# Patient Record
Sex: FEMALE | Race: WHITE | Employment: OTHER | URBAN - METROPOLITAN AREA
[De-identification: names, ages, dates, MRNs, and addresses within clinical notes are randomized per-mention and may not be internally consistent; named-entity substitution may affect disease eponyms.]

---

## 2017-02-23 ENCOUNTER — APPOINTMENT (OUTPATIENT)
Dept: GENERAL RADIOLOGY | Age: 72
End: 2017-02-23
Attending: EMERGENCY MEDICINE
Payer: MEDICARE

## 2017-02-23 ENCOUNTER — HOSPITAL ENCOUNTER (EMERGENCY)
Age: 72
Discharge: HOME | End: 2017-02-23
Attending: EMERGENCY MEDICINE
Payer: MEDICARE

## 2017-02-23 VITALS
RESPIRATION RATE: 18 BRPM | OXYGEN SATURATION: 99 % | HEART RATE: 80 BPM | TEMPERATURE: 97.7 F | HEIGHT: 62 IN | SYSTOLIC BLOOD PRESSURE: 125 MMHG | BODY MASS INDEX: 29.26 KG/M2 | WEIGHT: 159 LBS | DIASTOLIC BLOOD PRESSURE: 54 MMHG

## 2017-02-23 DIAGNOSIS — S80.02XA CONTUSION OF LEFT KNEE, INITIAL ENCOUNTER: Primary | ICD-10-CM

## 2017-02-23 DIAGNOSIS — S86.912A STRAIN OF LEFT KNEE AND LEG, INITIAL ENCOUNTER: ICD-10-CM

## 2017-02-23 PROCEDURE — 99282 EMERGENCY DEPT VISIT SF MDM: CPT

## 2017-02-23 PROCEDURE — 73564 X-RAY EXAM KNEE 4 OR MORE: CPT

## 2017-02-23 NOTE — ED TRIAGE NOTES
Triage: pt states that she caught her foot underneath her and fell to the hard surface floor. Pt complains of pain to left knee. Before fall, pt had appt with ortho this morning and had a routine injection of steroids in left knee.

## 2017-02-23 NOTE — DISCHARGE INSTRUCTIONS

## 2017-02-23 NOTE — ED NOTES
Steffen Louise is a 66 y.o. female that was discharged in good condition. The patients diagnosis, condition and treatment were explained to  patient and aftercare instructions were given. The patient verbalized understanding. Patient armband removed and shreddedPt declined discharge vital signs. Landy August

## 2017-02-23 NOTE — ED PROVIDER NOTES
HPI Comments: 11:12 AM Mona Valencia is a 66 y.o. female with a history of HTN, DM, Hyperlipidemia and Osteopenia who presents to the emergency department for evaluation after she fell today at the grocery store. Pt confirms that she tripped to initiate the fall, she denies any lightheadedness, dizziness, chest pain, blurred vision, chest tightness or SOB prior to or following the fall. She denies hitting her head with the fall. Pt states that when she fell she landed onto her L knee. She notes that she is getting injections into her L knee to promote increased cartilage in her knee, her second injection was this morning at 0830 AM. Pt denies any other pain secondary to the fall. Pt admits to drinking she denies smoking cigarettes. PCP: Chelsea Lynn MD          The history is provided by the patient.         Past Medical History:   Diagnosis Date    Diabetes mellitus ([facility] [address]     [facility]; on insulin pump    FHx: breast cancer     Hemorrhoids     Hyperlipidemia     Hypertension     Macular degeneration     and retinopathy Dr Marlene Wade Osteopenia        Past Surgical History:   Procedure Laterality Date    DEXA BONE DENSITY STUDY AXIAL      [facility] D[facility] t score -1.2 spine, -2.1 hip (11/10) improved from 2007/2003;  apparently no change from prior (1/13)   JOSE R Higginbotham, DIAGNOSTIC  2005    Dr. Paddy Mccormick negative   Dimitrios French 2015    US ABD COMP  8/02    negative         Family History:   Problem Relation Age of Onset    Cancer Mother      breast    Breast Cancer Mother        Social History     Social History    Marital status:      Spouse name: N/A    Number of children: 2    Years of education: N/A     Occupational History    ret  now part time [employer]      Social History Main Topics    Smoking status: Former Smoker     Quit date: 8/19/1995    Smokeless tobacco: Not on file    Alcohol use 0.5 oz/week     1 Glasses of wine per week      Comment: nightly    Drug use: No    Sexual activity: Not on file     Other Topics Concern    Not on file     Social History Narrative         ALLERGIES: Fosamax [alendronate]    Review of Systems   Constitutional: Negative for chills and fever. HENT: Negative for congestion and rhinorrhea. Eyes: Negative. Respiratory: Negative for cough and shortness of breath. Cardiovascular: Negative. Negative for chest pain and leg swelling. Gastrointestinal: Negative. Negative for abdominal pain and nausea. Genitourinary: Negative for dysuria and hematuria. Musculoskeletal: Positive for arthralgias (L knee pain). Negative for myalgias. Skin: Negative for rash and wound. Neurological: Negative for light-headedness and headaches. Psychiatric/Behavioral: Negative for confusion and hallucinations. All other systems reviewed and are negative. Vitals:    02/23/17 1020   BP: 125/54   Pulse: 80   Resp: 18   Temp: 97.7 °F (36.5 °C)   SpO2: 99%   Weight: 72.1 kg (159 lb)   Height: 5' 2\" (1.575 m)            Physical Exam   Constitutional: She is oriented to person, place, and time. She appears well-developed. HENT:   Head: Normocephalic. Mouth/Throat: Oropharynx is clear and moist.   Eyes: Pupils are equal, round, and reactive to light. Neck: Normal range of motion. Cardiovascular: Normal rate and normal heart sounds. No murmur heard. Pulmonary/Chest: Effort normal. She has no wheezes. She has no rales. Abdominal: Soft. There is no tenderness. Musculoskeletal: She exhibits no edema. Left knee: She exhibits decreased range of motion (secondary to pain). No cervical spine tenderness. Patella non tender, distal femur and proximal tibia non tender. Neurological: She is alert and oriented to person, place, and time. She has normal strength. No cranial nerve deficit or sensory deficit. No focal neurological deficits. Skin: Skin is warm and dry. Abrasion noted. 1 cm abrasion on skin of L patella. Nursing note and vitals reviewed. MDM  Number of Diagnoses or Management Options  Contusion of left knee, initial encounter:   Strain of left knee and leg, initial encounter:   Diagnosis management comments: mechanical fall  Left knee abrasion, ecchymosis  No patella tenderness. No Knee tenderness  No ant/post drawer sign  No bony tenderness  No wanting crutches and would rather bear weight. ED Course       Procedures    Vitals:  No data found. 99 %. Percentage is within normal limits. Medications ordered:   Medications - No data to display       X-Ray, CT or other radiology findings:  XR KNEE LT MIN 4 V   Final Result:  IMPRESSION:     1. No acute fracture or subluxation. 2. Mild joint effusion. 3. Tricompartmental osteoarthrosis, most pronounced in the medial compartment. Per radiology. Progress notes, Consult notes or Re-evaluation:   Discussed all results with pt and pt agrees with plan for discharge. All questions answered at this time. ED warnings given for any new or worsening symptoms. Pt voices understanding. Pt discharged in stable condition. Disposition:  Diagnosis:   1. Contusion of left knee, initial encounter    2. Strain of left knee and leg, initial encounter        Disposition: DISCHARGED    Follow-up Information     Follow up With Details Comments [address] [place] [address], [address] [place] [address] IVON/Giovanna Calderon 1106 [phone]      your bone doctor In 1 day            Scribe Attestation:     I, Elvira Carrel, scribing for and in the presence of Renate Garcia MD February 24, 2017 at 12:46 PM       Physician Attestation:   I personally performed the services described in this documentation, reviewed and edited the documentation which was dictated to the scribe in my presence, and it accurately records my words and actions.  Renate Garcia MD  February 24, 2017 at 12:46 PM    Signed by: Kristen Mcnally February 24, 2017, 12:46 PM

## 2017-02-24 ENCOUNTER — PATIENT OUTREACH (OUTPATIENT)
Dept: INTERNAL MEDICINE CLINIC | Age: 72
End: 2017-02-24

## 2017-02-24 ENCOUNTER — HOSPITAL ENCOUNTER (OUTPATIENT)
Dept: LAB | Age: 72
Discharge: HOME | End: 2017-02-24
Payer: MEDICARE

## 2017-02-24 ENCOUNTER — LAB ONLY (OUTPATIENT)
Dept: INTERNAL MEDICINE CLINIC | Age: 72
End: 2017-02-24

## 2017-02-24 DIAGNOSIS — E11.9 TYPE 2 DIABETES MELLITUS WITHOUT COMPLICATION, UNSPECIFIED LONG TERM INSULIN USE STATUS: Primary | ICD-10-CM

## 2017-02-24 DIAGNOSIS — E11.9 TYPE 2 DIABETES MELLITUS WITHOUT COMPLICATION, UNSPECIFIED LONG TERM INSULIN USE STATUS: ICD-10-CM

## 2017-02-24 LAB
ALBUMIN SERPL BCP-MCNC: 3.8 G/DL (ref 3.4–5)
ALBUMIN/GLOB SERPL: 1.3 {RATIO} (ref 0.8–1.7)
ALP SERPL-CCNC: 90 U/L (ref 45–117)
ALT SERPL-CCNC: 20 U/L (ref 13–56)
ANION GAP BLD CALC-SCNC: 9 MMOL/L (ref 3–18)
AST SERPL W P-5'-P-CCNC: 18 U/L (ref 15–37)
BASOPHILS # BLD AUTO: 0 K/UL (ref 0–0.06)
BASOPHILS # BLD: 1 % (ref 0–2)
BILIRUB SERPL-MCNC: 0.6 MG/DL (ref 0.2–1)
BUN SERPL-MCNC: 17 MG/DL (ref 7–18)
BUN/CREAT SERPL: 21 (ref 12–20)
CALCIUM SERPL-MCNC: 8.6 MG/DL (ref 8.5–10.1)
CHLORIDE SERPL-SCNC: 106 MMOL/L (ref 100–108)
CHOLEST SERPL-MCNC: 156 MG/DL
CO2 SERPL-SCNC: 25 MMOL/L (ref 21–32)
CREAT SERPL-MCNC: 0.8 MG/DL (ref 0.6–1.3)
DIFFERENTIAL METHOD BLD: NORMAL
EOSINOPHIL # BLD: 0.1 K/UL (ref 0–0.4)
EOSINOPHIL NFR BLD: 1 % (ref 0–5)
ERYTHROCYTE [DISTWIDTH] IN BLOOD BY AUTOMATED COUNT: 13.8 % (ref 11.6–14.5)
EST. AVERAGE GLUCOSE BLD GHB EST-MCNC: 189 MG/DL
GLOBULIN SER CALC-MCNC: 2.9 G/DL (ref 2–4)
GLUCOSE SERPL-MCNC: 90 MG/DL (ref 74–99)
HBA1C MFR BLD: 8.2 % (ref 4.2–5.6)
HCT VFR BLD AUTO: 41.4 % (ref 35–45)
HDLC SERPL-MCNC: 97 MG/DL (ref 40–60)
HDLC SERPL: 1.6 {RATIO} (ref 0–5)
HGB BLD-MCNC: 13.5 G/DL (ref 12–16)
LDLC SERPL CALC-MCNC: 47.4 MG/DL (ref 0–100)
LIPID PROFILE,FLP: ABNORMAL
LYMPHOCYTES # BLD AUTO: 25 % (ref 21–52)
LYMPHOCYTES # BLD: 1.5 K/UL (ref 0.9–3.6)
MCH RBC QN AUTO: 31.6 PG (ref 24–34)
MCHC RBC AUTO-ENTMCNC: 32.6 G/DL (ref 31–37)
MCV RBC AUTO: 97 FL (ref 74–97)
MONOCYTES # BLD: 0.4 K/UL (ref 0.05–1.2)
MONOCYTES NFR BLD AUTO: 7 % (ref 3–10)
NEUTS SEG # BLD: 4 K/UL (ref 1.8–8)
NEUTS SEG NFR BLD AUTO: 66 % (ref 40–73)
PLATELET # BLD AUTO: 279 K/UL (ref 135–420)
PMV BLD AUTO: 10 FL (ref 9.2–11.8)
POTASSIUM SERPL-SCNC: 3.9 MMOL/L (ref 3.5–5.5)
PROT SERPL-MCNC: 6.7 G/DL (ref 6.4–8.2)
RBC # BLD AUTO: 4.27 M/UL (ref 4.2–5.3)
SODIUM SERPL-SCNC: 140 MMOL/L (ref 136–145)
TRIGL SERPL-MCNC: 58 MG/DL (ref ?–150)
VLDLC SERPL CALC-MCNC: 11.6 MG/DL
WBC # BLD AUTO: 6.1 K/UL (ref 4.6–13.2)

## 2017-02-24 PROCEDURE — 80061 LIPID PANEL: CPT | Performed by: INTERNAL MEDICINE

## 2017-02-24 PROCEDURE — 36415 COLL VENOUS BLD VENIPUNCTURE: CPT | Performed by: INTERNAL MEDICINE

## 2017-02-24 PROCEDURE — 85025 COMPLETE CBC W/AUTO DIFF WBC: CPT | Performed by: INTERNAL MEDICINE

## 2017-02-24 PROCEDURE — 83036 HEMOGLOBIN GLYCOSYLATED A1C: CPT | Performed by: INTERNAL MEDICINE

## 2017-02-24 PROCEDURE — 80053 COMPREHEN METABOLIC PANEL: CPT | Performed by: INTERNAL MEDICINE

## 2017-02-24 PROCEDURE — 82043 UR ALBUMIN QUANTITATIVE: CPT | Performed by: INTERNAL MEDICINE

## 2017-02-24 NOTE — PROGRESS NOTES
NNTOCED  Patient admitted to [address] [place] [address], 2/23/17 to 2/23/17 for left knee contusion s/p fall. Presenting symptoms:   Left knee pain after fall    New medications/changes to current medications:  None     ED admissions in the past 6 months: 1    Contacted patient for ED follow up. Verified 2 patient identifiers. Introduced self, role and reason for call. Patient reports:  Hurts behind knee  Taking Aleve  Applying ice   Has full ROM  Tiny bruise at the top of knee    Patient denies:  Redness  Warm to touch  Tender to touch  Swelling    ADL's:  Performs all ADL's independently    DME:   None     Support:        Patient reported she is having pain behind left knee. Received a gel injection to left knee at Dr. Lorraine Marin office  the day of fall. She then went to [facility] to  some juice where she fell on floor hitting her left knee. Advised patient to contact the ortho office to notify them of fall and pain behind left knee. Patient communicated she would do so. Patient aware of appointment  Dr. Imani Beltran on 3/16/17. Advised patient if she needs to be seen sooner to contact office.

## 2017-02-25 LAB
CREAT UR-MCNC: 174.54 MG/DL (ref 30–125)
MICROALBUMIN UR-MCNC: 1.2 MG/DL (ref 0–3)
MICROALBUMIN/CREAT UR-RTO: 7 MG/G (ref 0–30)

## 2017-03-13 NOTE — PROGRESS NOTES
This is a Subsequent Medicare Annual Wellness Visit providing Personalized Prevention Plan Services (PPPS)     I have reviewed the patient's medical history in detail and updated the computerized patient record. History     Past Medical History:   Diagnosis Date    Arthritis     Dr Surekha Wallis; knees    Diabetes mellitus ([facility] [address]     [facility]; on insulin pump    FHx: breast cancer     Hemorrhoids     Hyperlipidemia     Hypertension     Macular degeneration     and retinopathy Dr Cassandra Sheridan t score -1.2 spine, -2.1 hip (11/10) improved from 2007/2003;  no change (1/13)      Past Surgical History:   Procedure Laterality Date    Satnam Shirley 2015   Donata Goods      Dr. Yomaira Montgomery 2005 negative; Dr Corky Brown 9/15 negative    US ABD COMP  8/02    negative     Current Outpatient Prescriptions   Medication Sig Dispense Refill    insulin lispro (HUMALOG) 100 unit/mL injection by Continuous Infusion route. On insulin pump managed by DI      evening primrose oil 500 mg cap Take 500 mg by mouth nightly.  raloxifene (EVISTA) 60 mg tablet Take  by mouth daily.  simvastatin (ZOCOR) 20 mg tablet Take  by mouth nightly.  lisinopril (PRINIVIL, ZESTRIL) 10 mg tablet Take  by mouth daily.          Allergies   Allergen Reactions    Fosamax [Alendronate] Other (comments)     GI upset     Family History   Problem Relation Age of Onset    Cancer Mother      breast    Breast Cancer Mother      Social History   Substance Use Topics    Smoking status: Former Smoker     Quit date: 8/19/1995    Smokeless tobacco: Not on file    Alcohol use 0.5 oz/week     1 Glasses of wine per week      Comment: nightly     Depression Risk Factor Screening:     PHQ 2 / 9, over the last two weeks 3/16/2017   Little interest or pleasure in doing things Not at all   Feeling down, depressed or hopeless Not at all   Total Score PHQ 2 0     SCREENINGS  Colonoscopy last done 9/15 Dr Lex Babcock last done 9/14  DEXA last done 1/13  Gyn last done Dr Yury Jarvis 5/12    Immunization History   Administered Date(s) Administered    Influenza High Dose Vaccine PF 10/09/2015    Influenza Vaccine 09/11/2013    Influenza Vaccine Whole 09/20/2011, 10/03/2012    Pneumococcal Conjugate (PCV-13) 07/10/2015    Pneumococcal Vaccine (Unspecified Type) 01/01/1999, 01/18/2008    TDAP Vaccine 05/24/2012    Zoster 02/26/2008     Alcohol Risk Factor Screening: On any occasion during the past 3 months, have you had more than 3 drinks containing alcohol? No    Do you average more than 7 drinks per week? No      Functional Ability and Level of Safety:     Hearing Loss   normal-to-mild    Vision - no acute complaints and is followed by Dr Obinna Lucero and Dr Catina Colby of [facility]   Self-care. Requires assistance with: no ADLs    Fall Risk     Fall Risk Assessment, last 12 mths 3/16/2017   Able to walk? Yes   Fall in past 12 months? No     Abuse Screen   Patient is not abused    Review of Systems   A comprehensive review of systems was negative except for that written in the HPI.     Physical Examination     Evaluation of Cognitive Function:  Mood/affect:  neutral  Appearance: age appropriate, well dressed and within normal Limits  Family member/caregiver input: na    Patient Care Team:  Elyse Johns MD as PCP - General (Internal Medicine)  Nadine Gonzalez, RN as Ambulatory Care Navigator (Internal Medicine)  Diana Willson, YOUSIF as Ambulatory Care Navigator (Internal Medicine)    Advice/Referrals/Counseling   Education and counseling provided:  Are appropriate based on today's review and evaluation  End-of-Life planning (with patient's consent)  Pneumococcal Vaccine  Influenza Vaccine  Screening Mammography  Colorectal cancer screening tests  Cardiovascular screening blood test  Bone mass measurement (DEXA)  Diabetes screening test    Assessment/Plan ICD-10-CM ICD-9-CM    1. Routine general medical examination at a health care facility Z00.00 V70.0    2. Screening for alcoholism Z13.89 V79.1    3. Advanced directives, counseling/discussion Z71.89 V65.49 ADVANCE CARE PLANNING FIRST 30 MINS   4. Screening for depression Z13.89 V79.0 DEPRESSION SCREEN ANNUAL   5. Screen for colon cancer Z12.11 V76.51    6. Screening for ischemic heart disease Z13.6 V81.0    7. Encounter for screening mammogram for malignant neoplasm of breast Z12.31 V76.12    8. Postmenopausal Z78.0 V49.81    9. Diabetes mellitus due to underlying condition with diabetic nephropathy, with long-term current use of insulin ([facility]) E08.21 249.40 CBC W/O DIFF    Z79.4 583.81 LIPID PANEL     R83.35 METABOLIC PANEL, COMPREHENSIVE      HEMOGLOBIN A1C W/O EAG      MICROALBUMIN, UR, RAND W/ MICROALBUMIN/CREA RATIO   10. Osteopenia M85.80 733.90    11. Hyperlipidemia, unspecified hyperlipidemia type E78.5 272.4    12. Primary hypertension I10 401.9    13. Uncontrolled type 2 diabetes mellitus without complication, with long-term current use of insulin ([facility]) E11.65 250.02     Z79.4 V58.67      current treatment plan is effective, no change in therapy  lab results and schedule of future lab studies reviewed with patient. End of life discussion undertaken.   Will get copy of AMD  Flu high dose when in season  Colonoscopy done  Mammo to be scheduled 2017  DEXA results to be obtained

## 2017-03-13 NOTE — ACP (ADVANCE CARE PLANNING)
Advance Care Planning    Advance Care Planning (ACP) Provider Note - Comprehensive     Date of ACP Conversation: 03/16/17  Persons included in Conversation:  patient  Length of ACP Conversation in minutes:  16 minutes    Authorized Decision Maker (if patient is incapable of making informed decisions): This person is: daughter  Healthcare Agent/Medical Power of  under Advance Directive          General ACP for ALL Patients with Decision Making Capacity:   Importance of advance care planning, including choosing a healthcare agent to communicate patient's healthcare decisions if patient lost the ability to make decisions, such as after a sudden illness or accident  Understanding of the healthcare agent role was assessed and information provided  Exploration of values, goals, and preferences if recovery is not expected, even with continued medical treatment in the event of: Imminent death  Severe, permanent brain injury    Review of Existing Advance Directive:  Does this advance directive still reflect your preferences? Yes (Provide new form/Refer for assistance in updating)    For Serious or Chronic Illness:  Understanding of medical condition    Understanding of CPR, goals and expected outcomes, benefits and burdens discussed.     Interventions Provided:  Recommended communicating the plan and making copies for the healthcare agent, personal physician, and others as appropriate (e.g., health system)  Recommended review of completed ACP document annually or upon change in health status

## 2017-03-13 NOTE — PATIENT INSTRUCTIONS
Medicare Part B Preventive Services Limitations Recommendation Scheduled   Bone Mass Measurement  (age 72 & older, biennial) Requires diagnosis related to osteoporosis or estrogen deficiency. Biennial benefit unless patient has history of long-term glucocorticoid tx or baseline is needed because initial test was by other method     Cardiovascular Screening Blood Tests (every 5 years)  Total cholesterol, HDL, Triglycerides Order as a panel if possible     Colorectal Cancer Screening  -Fecal occult blood test (annual)  -Flexible sigmoidoscopy (5y)  -Screening colonoscopy (10y)  -Barium Enema      Counseling to Prevent Tobacco Use (up to 8 sessions per year)  - Counseling greater than 3 and up to 10 minutes  - Counseling greater than 10 minutes Patients must be asymptomatic of tobacco-related conditions to receive as preventive service     Diabetes Screening Tests (at least every 3 years, Medicare covers annually or at 6-month intervals for prediabetic patients)    Fasting blood sugar (FBS) or glucose tolerance test (GTT) Patient must be diagnosed with one of the following:  -Hypertension, Dyslipidemia, obesity, previous impaired FBS or GTT  Or any two of the following: overweight, FH of diabetes, age ? 72, history of gestational diabetes, birth of baby weighing more than 9 pounds     Diabetes Self-Management Training (DSMT) (no USPSTF recommendation) Requires referral by treating physician for patient with diabetes or renal disease. 10 hours of initial DSMT session of no less than 30 minutes each in a continuous 12-month period. 2 hours of follow-up DSMT in subsequent years.      Glaucoma Screening (no USPSTF recommendation) Diabetes mellitus, family history, , age 48 or over,  American, age 72 or over     Human Immunodeficiency Virus (HIV) Screening (annually for increased risk patients)  HIV-1 and HIV-2 by EIA, ROC, rapid antibody test, or oral mucosa transudate Patient must be at increased risk for HIV infection per USPSTF guidelines or pregnant. Tests covered annually for patients at increased risk. Pregnant patients may receive up to 3 test during pregnancy. Medical Nutrition Therapy (MNT) (for diabetes or renal disease not recommended schedule) Requires referral by treating physician for patient with diabetes or renal disease. Can be provided in same year as diabetes self-management training (DSMT), and CMS recommends medical nutrition therapy take place after DSMT. Up to 3 hours for initial year and 2 hours in subsequent years. Prostate Cancer Screening (annually up to age 76)  - Digital rectal exam (ENOCH)  - Prostate specific antigen (PSA) Annually (age 48 or over), ENOCH not paid separately when covered E/M service is provided on same date     Seasonal Influenza Vaccination (annually)      Pneumococcal Vaccination (once after 72)      Hepatitis B Vaccinations (if medium/high risk) Medium/high risk factors:  End-stage renal disease,  Hemophiliacs who received Factor VIII or IX concentrates, Clients of institutions for the mentally retarded, Persons who live in the same house as a HepB virus carrier, Homosexual men, Illicit injectable drug abusers. Screening Mammography (biennial age 54-69)? Annually (age 36 or over)     Screening Pap Tests and Pelvic Examination (up to age 79 and after 79 if unknown history or abnormal study last 10 years) Every 25 months except high risk     Ultrasound Screening for Abdominal Aortic Aneurysm (AAA) (once) Patient must be referred through IPPE and not have had a screening for abdominal aortic aneurysm before under Medicare.   Limited to patients who meet one of the following criteria:  - Men who are 73-68 years old and have smoked more than 100 cigarettes in their lifetime.  -Anyone with a FH of AAA  -Anyone recommended for screening by USPSTF

## 2017-03-13 NOTE — PROGRESS NOTES
66 y.o. white female who presents for evaluation. She seems to be doing very well. She continues to see URI Galeas at the [facility]. No polyuria, polydipsia, nocturia. No neuropathy symptoms, she continues to see Dr. Dileep West but not podiatry. Denies any cardiovascular complaints. Her knees have been an issue lately but she tries to walk her dog a few blocks around the [facility] daily    She is seeing Dr Kike jimenez for the arthritis    Denies GI or  complaints. Past Medical History:   Diagnosis Date    Arthritis     Dr Shannon Wallis; knees    Diabetes mellitus ([facility] [address]     [facility]; on insulin pump    FHx: breast cancer     Hemorrhoids     Hyperlipidemia     Hypertension     Macular degeneration     and retinopathy Dr Butler Medal t score -1.2 spine, -2.1 hip (11/10) improved from 2007/2003;  no change (1/13)     Past Surgical History:   Procedure Laterality Date    Alvin West 2015   Nicole Carpenter 2005 negative; Dr Paloma Driscoll 9/15 negative    US ABD COMP  8/02    negative     Social History     Social History    Marital status:      Spouse name: N/A    Number of children: 2    Years of education: N/A     Occupational History    ret  now part time [employer]      Social History Main Topics    Smoking status: Former Smoker     Quit date: 8/19/1995    Smokeless tobacco: Not on file    Alcohol use 0.5 oz/week     1 Glasses of wine per week      Comment: nightly    Drug use: No    Sexual activity: Not on file     Other Topics Concern    Not on file     Social History Narrative     Current Outpatient Prescriptions   Medication Sig    insulin lispro (HUMALOG) 100 unit/mL injection by Continuous Infusion route. On insulin pump managed by DI    evening primrose oil 500 mg cap Take 500 mg by mouth nightly.  raloxifene (EVISTA) 60 mg tablet Take  by mouth daily.       simvastatin (ZOCOR) 20 mg tablet Take  by mouth nightly.  lisinopril (PRINIVIL, ZESTRIL) 10 mg tablet Take  by mouth daily. No current facility-administered medications for this visit. Allergies   Allergen Reactions    Fosamax [Alendronate] Other (comments)     GI upset     LAST MEDICARE WELLNESS EXAM: 7/10/14, 7/11/15, 3/16/17  ACP 3/16/17    REVIEW OF SYSTEMS:  gyn Dr Tae Raphael 2012, mammo 2012, sees Dr Yonathan Nolasco, last DEXA 1/13, colo 9/15 Dr Jodie Robertson  Ophtho - no vision change or eye pain  Oral - no mouth pain, tongue or tooth problems  Ears - no hearing loss, ear pain, fullness, no swallowing problems  Cardiac - no CP, PND, orthopnea, edema, palpitations or syncope  Chest - no breast masses  Resp - no wheezing, chronic coughing, dyspnea  GI - no heartburn, nausea, vomiting, change in bowel habits, bleeding, hemorrhoids  Urinary - no dysuria, hematuria, flank pain, urgency, frequency  Ortho - no swelling, dec ROM, myalgias  Derm - no nail abnormalities, rashes, lesions of note, hair loss  Psych - denies any anxiety or depression symptoms, no hallucinations or violent ideation  Constitutional - no wt loss, night sweats, unexplained fevers  Neuro - no focal weakness, numbness, paresthesias, incoordination, ataxia, involuntary movements  Endo - no polyuria, polydipsia, nocturia, hot flashes    Visit Vitals    /72    Pulse 96    Temp 97.9 °F (36.6 °C) (Oral)    Ht 5' 2\" (1.575 m)    Wt 162 lb (73.5 kg)    SpO2 98%    BMI 29.63 kg/m2     Affect is appropriate. Mood stable  No apparent distress  HEENT --Anicteric sclerae, tympanic membranes normal,  ear canals normal.  No thyromegaly, JVD, or bruits. PERRL, EOMI, conjunctiva and lids normal.    Lungs --Clear to auscultation and percussion, normal percussion. Heart --Regular rate and rhythm, no murmurs, rubs, gallops, or clicks. Chest wall --Nontender to palpation. PMI normal.  Abdomen -- Soft and nontender, no hepatosplenomegaly or masses.   Extremities -- Without cyanosis, clubbing, edema. 2+ pulses equally and bilaterally. Foot exam bilaterally showed  Reflexes 2+   Vibration, proprioception, filament test intact  Pulses 1-2+ DP and PT    LABS   From 5/12 showed   gluc 155, cr 0.69, gfr 90,  alt 12,          chol 149, tg 75, hdl 76, ldl-c 58,      hb 13.2, plt 290, sadia neg  From 11/13 showed gluc 134, cr 0.69, gfr>60,    hba1c 7.3,      wbc 7.9, hb 11.1, plt 235, ck/trop neg  From 1/14 showed                     umar 9.0  From 4/14 showed        hba1c 7.0, chol 162, tg 73, hdl 98, ldl-c 50  From 7/15 showed   gluc 101, cr 0.76, gfr>60, alt<5,  hba1c 8.0, chol 152, tg 49, hdl 81, ldl-c 61, wbc 5.3, hb 13.6, plt 245, umar 4.2    Results for orders placed or performed during the hospital encounter of 02/24/17   MICROALBUMIN, UR, RAND W/ MICROALBUMIN/CREA RATIO   Result Value Ref Range    Microalbumin,urine random 1.20 0 - 3.0 MG/DL    Creatinine, urine 174.54 (H) 30 - 125 mg/dL    Microalbumin/Creat ratio (mg/g creat) 7 0 - 30 mg/g   METABOLIC PANEL, COMPREHENSIVE   Result Value Ref Range    Sodium 140 136 - 145 mmol/L    Potassium 3.9 3.5 - 5.5 mmol/L    Chloride 106 100 - 108 mmol/L    CO2 25 21 - 32 mmol/L    Anion gap 9 3.0 - 18 mmol/L    Glucose 90 74 - 99 mg/dL    BUN 17 7.0 - 18 MG/DL    Creatinine 0.80 0.6 - 1.3 MG/DL    BUN/Creatinine ratio 21 (H) 12 - 20      GFR est AA >60 >60 ml/min/1.73m2    GFR est non-AA >60 >60 ml/min/1.73m2    Calcium 8.6 8.5 - 10.1 MG/DL    Bilirubin, total 0.6 0.2 - 1.0 MG/DL    ALT (SGPT) 20 13 - 56 U/L    AST (SGOT) 18 15 - 37 U/L    Alk.  phosphatase 90 45 - 117 U/L    Protein, total 6.7 6.4 - 8.2 g/dL    Albumin 3.8 3.4 - 5.0 g/dL    Globulin 2.9 2.0 - 4.0 g/dL    A-G Ratio 1.3 0.8 - 1.7     LIPID PANEL   Result Value Ref Range    LIPID PROFILE          Cholesterol, total 156 <200 MG/DL    Triglyceride 58 <150 MG/DL    HDL Cholesterol 97 (H) 40 - 60 MG/DL    LDL, calculated 47.4 0 - 100 MG/DL    VLDL, calculated 11.6 MG/DL    CHOL/HDL Ratio 1.6 0 - 5.0     HEMOGLOBIN A1C WITH EAG   Result Value Ref Range    Hemoglobin A1c 8.2 (H) 4.2 - 5.6 %    Est. average glucose 189 mg/dL   CBC WITH AUTOMATED DIFF   Result Value Ref Range    WBC 6.1 4.6 - 13.2 K/uL    RBC 4.27 4.20 - 5.30 M/uL    HGB 13.5 12.0 - 16.0 g/dL    HCT 41.4 35.0 - 45.0 %    MCV 97.0 74.0 - 97.0 FL    MCH 31.6 24.0 - 34.0 PG    MCHC 32.6 31.0 - 37.0 g/dL    RDW 13.8 11.6 - 14.5 %    PLATELET 338 222 - 509 K/uL    MPV 10.0 9.2 - 11.8 FL    NEUTROPHILS 66 40 - 73 %    LYMPHOCYTES 25 21 - 52 %    MONOCYTES 7 3 - 10 %    EOSINOPHILS 1 0 - 5 %    BASOPHILS 1 0 - 2 %    ABS. NEUTROPHILS 4.0 1.8 - 8.0 K/UL    ABS. LYMPHOCYTES 1.5 0.9 - 3.6 K/UL    ABS. MONOCYTES 0.4 0.05 - 1.2 K/UL    ABS. EOSINOPHILS 0.1 0.0 - 0.4 K/UL    ABS. BASOPHILS 0.0 0.0 - 0.06 K/UL    DF AUTOMATED       Patient Active Problem List   Diagnosis Code    Diabetes mellitus on insulin pump Dr. Erik Elizondo E11.9    Osteopenia M85.80    Hyperlipidemia E78.5    Primary hypertension I10    Uncontrolled type 2 diabetes mellitus without complication, with long-term current use of insulin ([facility]) E11.65, Z79.4     Assessment and plan:  1. Diabetes. Doing relatively well, f/u NP at [facility], f/u Dr. Yolanda Bourgeois  2. Hypertension. Continue current  3. Hyperlipidemia. Continue Zocor   4. Osteopenia. Continue Evista, calcium plus D., weight bearing exercise. DEXA to be done        RTC 3/18    Above conditions discussed at length and patient vocalized understanding.   All questions answered to patient satifaction

## 2017-03-16 ENCOUNTER — OFFICE VISIT (OUTPATIENT)
Dept: INTERNAL MEDICINE CLINIC | Age: 72
End: 2017-03-16

## 2017-03-16 VITALS
BODY MASS INDEX: 29.81 KG/M2 | SYSTOLIC BLOOD PRESSURE: 130 MMHG | DIASTOLIC BLOOD PRESSURE: 72 MMHG | HEART RATE: 96 BPM | WEIGHT: 162 LBS | HEIGHT: 62 IN | OXYGEN SATURATION: 98 % | TEMPERATURE: 97.9 F

## 2017-03-16 DIAGNOSIS — Z00.00 ROUTINE GENERAL MEDICAL EXAMINATION AT A HEALTH CARE FACILITY: Primary | ICD-10-CM

## 2017-03-16 DIAGNOSIS — M85.80 OSTEOPENIA: ICD-10-CM

## 2017-03-16 DIAGNOSIS — I10 PRIMARY HYPERTENSION: ICD-10-CM

## 2017-03-16 DIAGNOSIS — Z79.4 DIABETES MELLITUS DUE TO UNDERLYING CONDITION WITH DIABETIC NEPHROPATHY, WITH LONG-TERM CURRENT USE OF INSULIN (HCC): ICD-10-CM

## 2017-03-16 DIAGNOSIS — E08.21 DIABETES MELLITUS DUE TO UNDERLYING CONDITION WITH DIABETIC NEPHROPATHY, WITH LONG-TERM CURRENT USE OF INSULIN (HCC): ICD-10-CM

## 2017-03-16 DIAGNOSIS — Z13.39 SCREENING FOR ALCOHOLISM: ICD-10-CM

## 2017-03-16 DIAGNOSIS — Z13.31 SCREENING FOR DEPRESSION: ICD-10-CM

## 2017-03-16 DIAGNOSIS — Z12.11 SCREEN FOR COLON CANCER: ICD-10-CM

## 2017-03-16 DIAGNOSIS — Z12.31 ENCOUNTER FOR SCREENING MAMMOGRAM FOR MALIGNANT NEOPLASM OF BREAST: ICD-10-CM

## 2017-03-16 DIAGNOSIS — Z78.0 POSTMENOPAUSAL: ICD-10-CM

## 2017-03-16 DIAGNOSIS — Z71.89 ADVANCED DIRECTIVES, COUNSELING/DISCUSSION: ICD-10-CM

## 2017-03-16 DIAGNOSIS — E78.5 HYPERLIPIDEMIA, UNSPECIFIED HYPERLIPIDEMIA TYPE: ICD-10-CM

## 2017-03-16 DIAGNOSIS — Z13.6 SCREENING FOR ISCHEMIC HEART DISEASE: ICD-10-CM

## 2017-03-16 NOTE — PROGRESS NOTES
1. Have you been to the ER, urgent care clinic or hospitalized since your last visit? NO.     2. Have you seen or consulted any other health care providers outside of the [address] [place] [address] since your last visit (Include any pap smears or colon screening)? NO      Do you have an Advanced Directive? NO    Would you like information on Advanced Directives?  YES

## 2017-03-17 NOTE — PROGRESS NOTES
Please call patient and find out where she had her bone density done or who ordered it (her GYN dr, need name also)

## 2017-03-28 ENCOUNTER — PATIENT OUTREACH (OUTPATIENT)
Dept: INTERNAL MEDICINE CLINIC | Age: 72
End: 2017-03-28

## 2017-03-28 NOTE — PROGRESS NOTES
Episode closed:   Patient admitted to [address] [place] [address], 2/23/17 to 2/23/17 for left knee contusion s/p fall. No hospitalization or ED admission post 30 days from discharge of 2/23/17.

## 2017-10-12 ENCOUNTER — HOSPITAL ENCOUNTER (OUTPATIENT)
Dept: MAMMOGRAPHY | Age: 72
Discharge: HOME | End: 2017-10-12
Attending: INTERNAL MEDICINE
Payer: MEDICARE

## 2017-10-12 DIAGNOSIS — Z12.31 VISIT FOR SCREENING MAMMOGRAM: ICD-10-CM

## 2017-10-12 PROCEDURE — 77063 BREAST TOMOSYNTHESIS BI: CPT

## 2018-03-15 ENCOUNTER — LAB ONLY (OUTPATIENT)
Dept: INTERNAL MEDICINE CLINIC | Age: 73
End: 2018-03-15

## 2018-03-15 ENCOUNTER — HOSPITAL ENCOUNTER (OUTPATIENT)
Dept: LAB | Age: 73
Discharge: HOME | End: 2018-03-15
Payer: MEDICARE

## 2018-03-15 DIAGNOSIS — Z79.4 DIABETES MELLITUS DUE TO UNDERLYING CONDITION WITH DIABETIC NEPHROPATHY, WITH LONG-TERM CURRENT USE OF INSULIN (HCC): ICD-10-CM

## 2018-03-15 DIAGNOSIS — E08.21 DIABETES MELLITUS DUE TO UNDERLYING CONDITION WITH DIABETIC NEPHROPATHY, WITHOUT LONG-TERM CURRENT USE OF INSULIN (HCC): Primary | ICD-10-CM

## 2018-03-15 DIAGNOSIS — E08.21 DIABETES MELLITUS DUE TO UNDERLYING CONDITION WITH DIABETIC NEPHROPATHY, WITHOUT LONG-TERM CURRENT USE OF INSULIN (HCC): ICD-10-CM

## 2018-03-15 DIAGNOSIS — E08.21 DIABETES MELLITUS DUE TO UNDERLYING CONDITION WITH DIABETIC NEPHROPATHY, WITH LONG-TERM CURRENT USE OF INSULIN (HCC): ICD-10-CM

## 2018-03-15 LAB
ALBUMIN SERPL-MCNC: 3.9 G/DL (ref 3.4–5)
ALBUMIN/GLOB SERPL: 1.1 {RATIO} (ref 0.8–1.7)
ALP SERPL-CCNC: 90 U/L (ref 45–117)
ALT SERPL-CCNC: 20 U/L (ref 13–56)
ANION GAP SERPL CALC-SCNC: 8 MMOL/L (ref 3–18)
AST SERPL-CCNC: 13 U/L (ref 15–37)
BILIRUB SERPL-MCNC: 0.6 MG/DL (ref 0.2–1)
BUN SERPL-MCNC: 14 MG/DL (ref 7–18)
BUN/CREAT SERPL: 21 (ref 12–20)
CALCIUM SERPL-MCNC: 8.6 MG/DL (ref 8.5–10.1)
CHLORIDE SERPL-SCNC: 107 MMOL/L (ref 100–108)
CHOLEST SERPL-MCNC: 135 MG/DL
CO2 SERPL-SCNC: 28 MMOL/L (ref 21–32)
CREAT SERPL-MCNC: 0.68 MG/DL (ref 0.6–1.3)
ERYTHROCYTE [DISTWIDTH] IN BLOOD BY AUTOMATED COUNT: 14.3 % (ref 11.6–14.5)
GLOBULIN SER CALC-MCNC: 3.4 G/DL (ref 2–4)
GLUCOSE SERPL-MCNC: 84 MG/DL (ref 74–99)
HCT VFR BLD AUTO: 45.1 % (ref 35–45)
HDLC SERPL-MCNC: 90 MG/DL (ref 40–60)
HDLC SERPL: 1.5 {RATIO} (ref 0–5)
HGB BLD-MCNC: 14.7 G/DL (ref 12–16)
LDLC SERPL CALC-MCNC: 34.6 MG/DL (ref 0–100)
LIPID PROFILE,FLP: ABNORMAL
MCH RBC QN AUTO: 32.5 PG (ref 24–34)
MCHC RBC AUTO-ENTMCNC: 32.6 G/DL (ref 31–37)
MCV RBC AUTO: 99.6 FL (ref 74–97)
PLATELET # BLD AUTO: 292 K/UL (ref 135–420)
PMV BLD AUTO: 10.6 FL (ref 9.2–11.8)
POTASSIUM SERPL-SCNC: 4.2 MMOL/L (ref 3.5–5.5)
PROT SERPL-MCNC: 7.3 G/DL (ref 6.4–8.2)
RBC # BLD AUTO: 4.53 M/UL (ref 4.2–5.3)
SODIUM SERPL-SCNC: 143 MMOL/L (ref 136–145)
TRIGL SERPL-MCNC: 52 MG/DL (ref ?–150)
VLDLC SERPL CALC-MCNC: 10.4 MG/DL
WBC # BLD AUTO: 4.5 K/UL (ref 4.6–13.2)

## 2018-03-15 PROCEDURE — 80061 LIPID PANEL: CPT | Performed by: INTERNAL MEDICINE

## 2018-03-15 PROCEDURE — 80053 COMPREHEN METABOLIC PANEL: CPT | Performed by: INTERNAL MEDICINE

## 2018-03-15 PROCEDURE — 36415 COLL VENOUS BLD VENIPUNCTURE: CPT | Performed by: INTERNAL MEDICINE

## 2018-03-15 PROCEDURE — 83036 HEMOGLOBIN GLYCOSYLATED A1C: CPT | Performed by: INTERNAL MEDICINE

## 2018-03-15 PROCEDURE — 82043 UR ALBUMIN QUANTITATIVE: CPT | Performed by: INTERNAL MEDICINE

## 2018-03-15 PROCEDURE — 85027 COMPLETE CBC AUTOMATED: CPT | Performed by: INTERNAL MEDICINE

## 2018-03-16 LAB
CREAT UR-MCNC: 105.1 MG/DL (ref 30–125)
EST. AVERAGE GLUCOSE BLD GHB EST-MCNC: 180 MG/DL
HBA1C MFR BLD: 7.9 % (ref 4.2–5.6)
MICROALBUMIN UR-MCNC: 1.2 MG/DL (ref 0–3)
MICROALBUMIN/CREAT UR-RTO: 11 MG/G (ref 0–30)

## 2018-03-19 NOTE — PROGRESS NOTES
This is a Subsequent Medicare Annual Wellness Visit      I have reviewed the patient's medical history in detail and updated the computerized patient record. History     Past Medical History:   Diagnosis Date    Arthritis     Dr Gurney Felty; knees    Diabetes mellitus ([address]     [facility]; on insulin pump    FHx: breast cancer     Hemorrhoids     Hyperlipidemia     Hypertension     Macular degeneration     and retinopathy Dr Toshia Ureña t score -1.2 spine, -2.1 hip (11/10) improved from 2007/2003;  1.7 spine, -2.1 hip (2/16) [address]      Past Surgical History:   Procedure Laterality Date    Salena Blood      Dr Lalo Tsai 2015   Raul Muro 2005 negative; Dr Rocael Ramos 9/15 negative    HX ORTHOPAEDIC      DEXA 1.7 spine, -2.1 hip 2/16 [facility]    US ABD COMP  8/02    negative     Current Outpatient Prescriptions   Medication Sig Dispense Refill    insulin lispro (HUMALOG) 100 unit/mL injection by Continuous Infusion route. On insulin pump managed by DI      evening primrose oil 500 mg cap Take 500 mg by mouth nightly.  raloxifene (EVISTA) 60 mg tablet Take  by mouth daily.  simvastatin (ZOCOR) 20 mg tablet Take  by mouth nightly.  lisinopril (PRINIVIL, ZESTRIL) 10 mg tablet Take  by mouth daily.          Allergies   Allergen Reactions    Fosamax [Alendronate] Other (comments)     GI upset     Family History   Problem Relation Age of Onset    Cancer Mother      breast    Breast Cancer Mother      Social History   Substance Use Topics    Smoking status: Former Smoker     Quit date: 8/19/1995    Smokeless tobacco: Never Used    Alcohol use 0.5 oz/week     1 Glasses of wine per week      Comment: nightly     Depression Risk Factor Screening:     PHQ over the last two weeks 3/22/2018   Little interest or pleasure in doing things Not at all   Feeling down, depressed or hopeless -   Total Score PHQ 2 - SCREENINGS  Colonoscopy last done 9/15 Dr Fadi Cardoso last done 10/17  DEXA last done 2/16  Gyn last done Dr Lonnie Thomas 5/12    Immunization History   Administered Date(s) Administered    Influenza High Dose Vaccine PF 10/09/2015, 09/29/2017    Influenza Vaccine 09/11/2013    Influenza Vaccine Whole 09/20/2011, 10/03/2012    Pneumococcal Conjugate (PCV-13) 07/10/2015    TDAP Vaccine 05/24/2012    ZZZ-RETIRED (DO NOT USE) Pneumococcal Vaccine (Unspecified Type) 01/01/1999, 01/18/2008    Zoster 02/26/2008     Alcohol Risk Factor Screening: On any occasion during the past 3 months, have you had more than 3 drinks containing alcohol? No    Do you average more than 7 drinks per week? No      Functional Ability and Level of Safety:     Hearing Loss   normal-to-mild    Vision - no acute complaints and is followed by Dr Son Merida and Dr Carlota Benjamin of Daily [facility]   Self-care. Requires assistance with: no ADLs    Fall Risk     Fall Risk Assessment, last 12 mths 3/22/2018   Able to walk? Yes   Fall in past 12 months? No     Abuse Screen   Patient is not abused    Review of Systems   A comprehensive review of systems was negative except for that written in the HPI.     Physical Examination     Evaluation of Cognitive Function:  Mood/affect:  neutral  Appearance: age appropriate, well dressed and within normal Limits  Family member/caregiver input: na    Patient Care Team:  Jewel Fxo MD as PCP - General (Internal Medicine)  Moon Grimaldo, RN as Ambulatory Care Navigator (Internal Medicine)  Anthony Castellon RN as Ambulatory Care Navigator (Internal Medicine)    Advice/Referrals/Counseling   Education and counseling provided:  Are appropriate based on today's review and evaluation  End-of-Life planning (with patient's consent)  Pneumococcal Vaccine  Influenza Vaccine  Screening Mammography  Colorectal cancer screening tests  Cardiovascular screening blood test  Bone mass measurement (DEXA)  Diabetes screening test    Assessment/Plan       ICD-10-CM ICD-9-CM    1. Medicare annual wellness visit, subsequent Z00.00 V70.0    2. Advanced directives, counseling/discussion Z71.89 V65.49 ADVANCE CARE PLANNING FIRST 30 MINS   3. Screening for alcoholism Z13.89 V79.1 AK ANNUAL ALCOHOL SCREEN 15 MIN   4. Screening for depression Z13.89 V79.0 DEPRESSION SCREEN ANNUAL   5. Screen for colon cancer Z12.11 V76.51    6. Screening for ischemic heart disease Z13.6 V81.0    7. Encounter for screening mammogram for malignant neoplasm of breast Z12.31 V76.12    8. Disorder of bone and cartilage M89.9 733.90     M94.9     9. Primary hypertension I10 401.9    10. Diabetes mellitus due to underlying condition with diabetic nephropathy, without long-term current use of insulin ([facility]) E08.21 249.40  DIABETES FOOT EXAM     583.81 CBC W/O DIFF      LIPID PANEL      METABOLIC PANEL, COMPREHENSIVE      MICROALBUMIN, UR, RAND W/ MICROALB/CREAT RATIO   11. Osteopenia, unspecified location M85.80 733.90    12. Hyperlipidemia, unspecified hyperlipidemia type E78.5 272.4    13. Anxiety F41.9 300.00    14. Stable proliferative diabetic retinopathy associated with type 2 diabetes mellitus, unspecified laterality ([facility]) E11.3559 250.50      362.02      current treatment plan is effective, no change in therapy  lab results and schedule of future lab studies reviewed with patient. End of life discussion undertaken. Will get copy of living will  Flu high dose when in season  Colonoscopy beyond age?   Mammo to be scheduled 2018  DEXA per Jorge A Arora

## 2018-03-19 NOTE — PROGRESS NOTES
67 y.o. white female who presents for evaluation. We have not seen her in a year but she seems to be doing well. Continues to see NP Bright at the [facility]. No polyuria, polydipsia, nocturia. No neuropathy symptoms. Sees Dr Ruchi Martínez now    Denies any cardiovascular complaints. Her knees have been an issue lately but she tries to walk her dog a few blocks around the [facility] daily    She is seeing Dr Mirian Drummond group for the arthritis    Denies GI or  complaints. Her  is stressing her with his health issues. She has help in taking care of him but she remains nervous and anxious.   No VI/halluc    Past Medical History:   Diagnosis Date    Arthritis     Dr Castro Douglass; knees    Diabetes mellitus ([facility] [address]     [facility]; on insulin pump    FHx: breast cancer     Hemorrhoids     Hyperlipidemia     Hypertension     Macular degeneration     and retinopathy Dr Govind Spencer t score -1.2 spine, -2.1 hip (11/10) improved from 2007/2003;  1.7 spine, -2.1 hip (2/16) [facility]     Past Surgical History:   Procedure Laterality Date    Tai Flores 2015   [place]      Dr. Casey Canavan 2005 negative; Dr Rc Guerra 9/15 negative    HX ORTHOPAEDIC      DEXA 1.7 spine, -2.1 hip 2/16 [facility]    US ABD COMP  8/02    negative     Social History     Social History    Marital status:      Spouse name: N/A    Number of children: 2    Years of education: N/A     Occupational History    ret  now part time [employer]      Social History Main Topics    Smoking status: Former Smoker     Quit date: 8/19/1995    Smokeless tobacco: Never Used    Alcohol use 0.5 oz/week     1 Glasses of wine per week      Comment: nightly    Drug use: No    Sexual activity: Not on file     Other Topics Concern    Not on file     Social History Narrative     Current Outpatient Prescriptions   Medication Sig    insulin lispro (HUMALOG) 100 unit/mL injection by Continuous Infusion route. On insulin pump managed by DI    evening primrose oil 500 mg cap Take 500 mg by mouth nightly.  raloxifene (EVISTA) 60 mg tablet Take  by mouth daily.  simvastatin (ZOCOR) 20 mg tablet Take  by mouth nightly.  lisinopril (PRINIVIL, ZESTRIL) 10 mg tablet Take  by mouth daily. No current facility-administered medications for this visit. Allergies   Allergen Reactions    Fosamax [Alendronate] Other (comments)     GI upset     REVIEW OF SYSTEMS:  gyn Dr Curiel Lob 2012, mammo 10/17, sees Dr Raman Sagastume, last DEXA 2/16, colo 9/15 Dr Sven Maynard  Ophtho - no vision change or eye pain  Oral - no mouth pain, tongue or tooth problems  Ears - no hearing loss, ear pain, fullness, no swallowing problems  Cardiac - no CP, PND, orthopnea, edema, palpitations or syncope  Chest - no breast masses  Resp - no wheezing, chronic coughing, dyspnea  GI - no heartburn, nausea, vomiting, change in bowel habits, bleeding, hemorrhoids  Urinary - no dysuria, hematuria, flank pain, urgency, frequency  Ortho - no swelling, dec ROM, myalgias  Derm - no nail abnormalities, rashes, lesions of note, hair loss  Psych - denies any anxiety or depression symptoms, no hallucinations or violent ideation  Constitutional - no wt loss, night sweats, unexplained fevers  Neuro - no focal weakness, numbness, paresthesias, incoordination, ataxia, involuntary movements  Endo - no polyuria, polydipsia, nocturia, hot flashes    Visit Vitals    /78    Pulse 83    Temp 98.4 °F (36.9 °C) (Oral)    Ht 5' 2\" (1.575 m)    Wt 147 lb (66.7 kg)    SpO2 98%    BMI 26.89 kg/m2     Affect is appropriate. Mood stable  No apparent distress  HEENT --Anicteric sclerae, tympanic membranes normal,  ear canals normal.  No thyromegaly, JVD, or bruits. PERRL, EOMI, conjunctiva and lids normal.    Lungs --Clear to auscultation and percussion, normal percussion.   Heart --Regular rate and rhythm, no murmurs, rubs, gallops, or clicks. Chest wall --Nontender to palpation. PMI normal.  Abdomen -- Soft and nontender, no hepatosplenomegaly or masses. Extremities -- Without cyanosis, clubbing, edema. 2+ pulses equally and bilaterally.   Foot exam bilaterally showed  Reflexes 2+   Vibration, proprioception, filament test intact  Pulses 1-2+ DP and PT    LABS   From 5/12 showed   gluc 155, cr 0.69, gfr 90,  alt 12,          chol 149, tg 75, hdl 76, ldl-c 58,      hb 13.2, plt 290, sadia neg  From 11/13 showed gluc 134, cr 0.69, gfr>60,    hba1c 7.3,      wbc 7.9, hb 11.1, plt 235, ck/trop neg  From 1/14 showed                     umar 9.0  From 4/14 showed        hba1c 7.0, chol 162, tg 73, hdl 98, ldl-c 50  From 7/15 showed   gluc 101, cr 0.76, gfr>60, alt<5,  hba1c 8.0, chol 152, tg 49, hdl 81, ldl-c 61, wbc 5.3, hb 13.6, plt 245, umar 4.2  From 3/17 showed   gluc 90,   cr 0.80, gfr>60, alt 20, hba1c 8.2, hcol 156, tg 58, hdl 97, ldl-c 47, wbc 6.1, hb 13.5, plt 279, umar 7.0    Results for orders placed or performed during the hospital encounter of 03/15/18   CBC W/O DIFF   Result Value Ref Range    WBC 4.5 (L) 4.6 - 13.2 K/uL    RBC 4.53 4.20 - 5.30 M/uL    HGB 14.7 12.0 - 16.0 g/dL    HCT 45.1 (H) 35.0 - 45.0 %    MCV 99.6 (H) 74.0 - 97.0 FL    MCH 32.5 24.0 - 34.0 PG    MCHC 32.6 31.0 - 37.0 g/dL    RDW 14.3 11.6 - 14.5 %    PLATELET 571 487 - 502 K/uL    MPV 10.6 9.2 - 11.8 FL   LIPID PANEL   Result Value Ref Range    LIPID PROFILE          Cholesterol, total 135 <200 MG/DL    Triglyceride 52 <150 MG/DL    HDL Cholesterol 90 (H) 40 - 60 MG/DL    LDL, calculated 34.6 0 - 100 MG/DL    VLDL, calculated 10.4 MG/DL    CHOL/HDL Ratio 1.5 0 - 5.0     METABOLIC PANEL, COMPREHENSIVE   Result Value Ref Range    Sodium 143 136 - 145 mmol/L    Potassium 4.2 3.5 - 5.5 mmol/L    Chloride 107 100 - 108 mmol/L    CO2 28 21 - 32 mmol/L    Anion gap 8 3.0 - 18 mmol/L    Glucose 84 74 - 99 mg/dL    BUN 14 7.0 - 18 MG/DL    Creatinine 0.68 0.6 - 1.3 MG/DL    BUN/Creatinine ratio 21 (H) 12 - 20      GFR est AA >60 >60 ml/min/1.73m2    GFR est non-AA >60 >60 ml/min/1.73m2    Calcium 8.6 8.5 - 10.1 MG/DL    Bilirubin, total 0.6 0.2 - 1.0 MG/DL    ALT (SGPT) 20 13 - 56 U/L    AST (SGOT) 13 (L) 15 - 37 U/L    Alk. phosphatase 90 45 - 117 U/L    Protein, total 7.3 6.4 - 8.2 g/dL    Albumin 3.9 3.4 - 5.0 g/dL    Globulin 3.4 2.0 - 4.0 g/dL    A-G Ratio 1.1 0.8 - 1.7     MICROALBUMIN, UR, RAND W/ MICROALBUMIN/CREA RATIO   Result Value Ref Range    Microalbumin,urine random 1.20 0 - 3.0 MG/DL    Creatinine, urine 105.10 30 - 125 mg/dL    Microalbumin/Creat ratio (mg/g creat) 11 0 - 30 mg/g   HEMOGLOBIN A1C WITH EAG   Result Value Ref Range    Hemoglobin A1c 7.9 (H) 4.2 - 5.6 %    Est. average glucose 180 mg/dL     Patient Active Problem List   Diagnosis Code    Diabetes mellitus on insulin pump Dr. Kylee Roach E11.9    Osteopenia M85.80    Hyperlipidemia E78.5    Primary hypertension I10    Proliferative diabetic retinopathy associated with type 2 diabetes mellitus ([facility] [address] E24.1515     Assessment and plan:  1. Diabetes. Doing relatively well, f/u NP at [facility], f/u Dr. Danika Ames  2. Hypertension. Continue current  3. Hyperlipidemia. Continue Zocor   4. Osteopenia. Continue Evista, calcium plus D., weight bearing exercise. DEXA being followed at [facility]  5. Anxiety? Asked her to fill out PHQ and KARIN forms. Discussed prn vs maintenance meds. She wants to think about it        RTC 3/19    Above conditions discussed at length and patient vocalized understanding.   All questions answered to patient satifaction

## 2018-03-19 NOTE — PATIENT INSTRUCTIONS

## 2018-03-19 NOTE — ACP (ADVANCE CARE PLANNING)
Advance Care Planning    Advance Care Planning (ACP) Provider Note - Comprehensive     Date of ACP Conversation: 03/22/18  Persons included in Conversation:  patient  Length of ACP Conversation in minutes:  16 minutes    Authorized Decision Maker (if patient is incapable of making informed decisions): This person is: Other Legally Authorized Decision Maker (e.g. Next of Kin)          General ACP for ALL Patients with Decision Making Capacity:   Importance of advance care planning, including choosing a healthcare agent to communicate patient's healthcare decisions if patient lost the ability to make decisions, such as after a sudden illness or accident  Understanding of the healthcare agent role was assessed and information provided  Exploration of values, goals, and preferences if recovery is not expected, even with continued medical treatment in the event of: Imminent death  Severe, permanent brain injury    Review of Existing Advance Directive:  na    For Serious or Chronic Illness:  Understanding of medical condition    Understanding of CPR, goals and expected outcomes, benefits and burdens discussed.     Interventions Provided:  Recommended completion of Advance Directive form after review of ACP materials and conversation with prospective healthcare agent   Recommended communicating the plan and making copies for the healthcare agent, personal physician, and others as appropriate (e.g., health system)  Recommended review of completed ACP document annually or upon change in health status

## 2018-03-22 ENCOUNTER — OFFICE VISIT (OUTPATIENT)
Dept: INTERNAL MEDICINE CLINIC | Age: 73
End: 2018-03-22

## 2018-03-22 VITALS
TEMPERATURE: 98.4 F | SYSTOLIC BLOOD PRESSURE: 118 MMHG | HEIGHT: 62 IN | HEART RATE: 83 BPM | BODY MASS INDEX: 27.05 KG/M2 | OXYGEN SATURATION: 98 % | WEIGHT: 147 LBS | DIASTOLIC BLOOD PRESSURE: 78 MMHG

## 2018-03-22 DIAGNOSIS — F41.9 ANXIETY: ICD-10-CM

## 2018-03-22 DIAGNOSIS — Z13.31 SCREENING FOR DEPRESSION: ICD-10-CM

## 2018-03-22 DIAGNOSIS — E11.3559 STABLE PROLIFERATIVE DIABETIC RETINOPATHY ASSOCIATED WITH TYPE 2 DIABETES MELLITUS, UNSPECIFIED LATERALITY (HCC): ICD-10-CM

## 2018-03-22 DIAGNOSIS — Z12.11 SCREEN FOR COLON CANCER: ICD-10-CM

## 2018-03-22 DIAGNOSIS — I10 PRIMARY HYPERTENSION: ICD-10-CM

## 2018-03-22 DIAGNOSIS — M85.80 OSTEOPENIA, UNSPECIFIED LOCATION: ICD-10-CM

## 2018-03-22 DIAGNOSIS — Z13.6 SCREENING FOR ISCHEMIC HEART DISEASE: ICD-10-CM

## 2018-03-22 DIAGNOSIS — E08.21 DIABETES MELLITUS DUE TO UNDERLYING CONDITION WITH DIABETIC NEPHROPATHY, WITHOUT LONG-TERM CURRENT USE OF INSULIN (HCC): ICD-10-CM

## 2018-03-22 DIAGNOSIS — Z12.31 ENCOUNTER FOR SCREENING MAMMOGRAM FOR MALIGNANT NEOPLASM OF BREAST: ICD-10-CM

## 2018-03-22 DIAGNOSIS — M89.9 DISORDER OF BONE AND CARTILAGE: ICD-10-CM

## 2018-03-22 DIAGNOSIS — Z13.39 SCREENING FOR ALCOHOLISM: ICD-10-CM

## 2018-03-22 DIAGNOSIS — Z00.00 MEDICARE ANNUAL WELLNESS VISIT, SUBSEQUENT: Primary | ICD-10-CM

## 2018-03-22 DIAGNOSIS — M94.9 DISORDER OF BONE AND CARTILAGE: ICD-10-CM

## 2018-03-22 DIAGNOSIS — E78.5 HYPERLIPIDEMIA, UNSPECIFIED HYPERLIPIDEMIA TYPE: ICD-10-CM

## 2018-03-22 DIAGNOSIS — Z71.89 ADVANCED DIRECTIVES, COUNSELING/DISCUSSION: ICD-10-CM

## 2018-03-22 PROBLEM — E11.3599 PROLIFERATIVE DIABETIC RETINOPATHY ASSOCIATED WITH TYPE 2 DIABETES MELLITUS (HCC): Status: ACTIVE | Noted: 2018-03-22

## 2018-03-22 NOTE — MR AVS SNAPSHOT
[address] [place], [place] [address] [address] 
[phone] Patient: Nikki Driscoll MRN: [id number] CLT:[id number]/[id number] Visit Information Date & Time Provider Department Dept. Phone Encounter #  
 3/22/2018  1:00 PM Amparo Josue MD [facility] [phone] Upcoming Health Maintenance Date Due  
 EYE EXAM RETINAL OR DILATED Q1 12/9/2017 FOOT EXAM Q1 3/16/2018 HEMOGLOBIN A1C Q6M 9/15/2018 GLAUCOMA SCREENING Q2Y 12/9/2018 MICROALBUMIN Q1 3/15/2019 LIPID PANEL Q1 3/15/2019 MEDICARE YEARLY EXAM 3/23/2019 BREAST CANCER SCRN MAMMOGRAM 10/12/2019 DTaP/Tdap/Td series (2 - Td) 5/24/2022 COLONOSCOPY 9/25/2025 Allergies as of 3/22/2018  Review Complete On: 3/22/2018 By: Candy Crowder Severity Noted Reaction Type Reactions Fosamax [Alendronate]    Other (comments) GI upset Current Immunizations  Reviewed on 3/13/2017 Name Date Influenza High Dose Vaccine PF 9/29/2017, 10/9/2015 Influenza Vaccine 9/11/2013 Influenza Vaccine Whole 10/3/2012, 9/20/2011 Pneumococcal Conjugate (PCV-13) 7/10/2015  9:12 AM  
 TDAP Vaccine 5/24/2012 ZZZ-RETIRED (DO NOT USE) Pneumococcal Vaccine (Unspecified Type) 1/18/2008, 1/1/1999 Zoster 2/26/2008 Not reviewed this visit You Were Diagnosed With   
  
 Codes Comments Medicare annual wellness visit, subsequent    -  Primary ICD-10-CM: Z00.00 ICD-9-CM: V70.0 Advanced directives, counseling/discussion     ICD-10-CM: Z71.89 ICD-9-CM: V65.49 Screening for alcoholism     ICD-10-CM: Z13.89 ICD-9-CM: V79.1 Screening for depression     ICD-10-CM: Z13.89 ICD-9-CM: V79.0 Screen for colon cancer     ICD-10-CM: Z12.11 ICD-9-CM: V76.51 Screening for ischemic heart disease     ICD-10-CM: Z13.6 ICD-9-CM: V81.0  Encounter for screening mammogram for malignant neoplasm of breast     ICD-10-CM: Z12.31 
 ICD-9-CM: L32.51 Disorder of bone and cartilage     ICD-10-CM: M89.9, M94.9 ICD-9-CM: 733.90 Vitals BP Pulse Temp Height(growth percentile) Weight(growth percentile) SpO2  
 118/78 83 98.4 °F (36.9 °C) (Oral) 5' 2\" (1.575 m) 147 lb (66.7 kg) 98% BMI OB Status Smoking Status 26.89 kg/m2 Postmenopausal Former Smoker Vitals History BMI and BSA Data Body Mass Index Body Surface Area  
 26.89 kg/m 2 1.71 m 2 Your Updated Medication List  
  
   
This list is accurate as of 3/22/18  2:01 PM.  Always use your most recent med list.  
  
  
  
  
 evening primrose oil 500 mg Cap Take 500 mg by mouth nightly. EVISTA 60 mg tablet Generic drug:  raloxifene Take  by mouth daily. HumaLOG U-100 Insulin 100 unit/mL injection Generic drug:  insulin lispro  
by Continuous Infusion route. On insulin pump managed by DI  
  
 lisinopril 10 mg tablet Commonly known as:  Luetta Manzan[place] Take  by mouth daily. ZOCOR 20 mg tablet Generic drug:  simvastatin Take  by mouth nightly. We Performed the Following ADVANCE CARE PLANNING FIRST 30 MINS [35212 CPT(R)] [address] [[id number] [facility]] AR ANNUAL ALCOHOL SCREEN 15 MIN [id number] [facility]] Patient Instructions Medicare Wellness Visit, Female The best way to live healthy is to have a healthy lifestyle by eating a well-balanced diet, exercising regularly, limiting alcohol and stopping smoking. Regular physical exams and screening tests are another way to keep healthy. Preventive exams provided by your health care provider can find health problems before they become diseases or illnesses. Preventive services including immunizations, screening tests, monitoring and exams can help you take care of your own health. All people over age 72 should have a pneumovax  and and a prevnar shot to prevent pneumonia.  These are once in a lifetime unless you and your provider decide differently. All people over 65 should have a yearly flu shot and a tetanus vaccine every 10 years. A bone mass density to screen for osteoporosis or thinning of the bones should be done every 2 years after 65. Screening for diabetes mellitus with a blood sugar test should be done every year. Glaucoma is a disease of the eye due to increased ocular pressure that can lead to blindness and it should be done every year by an eye professional. 
 
Cardiovascular screening tests that check for elevated lipids (fatty part of blood) which can lead to heart disease and strokes should be done every 5 years. Colorectal screening that evaluates for blood or polyps in your colon should be done yearly as a stool test or every five years as a flexible sigmoidoscope or every 10 years as a colonoscopy up to age 76. Breast cancer screening with a mammogram is recommended biennially  for women age 54-69. Screening for cervical cancer with a pap smear and pelvic exam is recommended for women after age 72 years every 2 years up to age 79 or when the provider and patient decide to stop. If there is a history of cervical abnormalities or other increased risk for cancer then the test is recommended yearly. Hepatitis C screening is also recommended for anyone born between [birth year] through [address]. A shingles vaccine is also recommended once in a lifetime after age 61. Your Medicare Wellness Exam is recommended annually. Here is a list of your current Health Maintenance items with a due date: 
Health Maintenance Due Topic Date Due [facility] Eye Exam  12/09/2017 [facility] Diabetic Foot Care  03/16/2018 [facility] Annual Well Visit  03/17/2018 Introducing [facility]! Dear Munira Tyler: 
Thank you for requesting a "[employer] account. Our records indicate that you already have an active "[employer] account. You can access your account anytime at [email or web address] Did you know that you can access your hospital and ER discharge instructions at any time in [employer]? You can also review all of your test results from your hospital stay or ER visit. Additional Information If you have questions, please visit the Frequently Asked Questions section of the [employer] website at [email or web address] Remember, [employer] is NOT to be used for urgent needs. For medical emergencies, dial 911. Now available from your iPhone and Android! Please provide this summary of care documentation to your next provider. Your primary care clinician is listed as Lillette Pod. If you have any questions after today's visit, please call [phone].

## 2018-03-22 NOTE — ACP (ADVANCE CARE PLANNING)
acp discussion  She has living will at home and will try to give us copy  No poa selected and she will think about that

## 2018-03-22 NOTE — PROGRESS NOTES
1. Have you been to the ER, urgent care clinic or hospitalized since your last visit? NO.     2. Have you seen or consulted any other health care providers outside of the [facility] since your last visit (Include any pap smears or colon screening)? NO      Do you have an Advanced Directive? NO    Would you like information on Advanced Directives?  NO

## 2018-04-28 ENCOUNTER — HOSPITAL ENCOUNTER (EMERGENCY)
Age: 73
Discharge: HOME | End: 2018-04-28
Attending: EMERGENCY MEDICINE
Payer: MEDICARE

## 2018-04-28 VITALS
BODY MASS INDEX: 27.6 KG/M2 | TEMPERATURE: 98.5 F | HEART RATE: 87 BPM | OXYGEN SATURATION: 98 % | RESPIRATION RATE: 20 BRPM | HEIGHT: 62 IN | WEIGHT: 150 LBS | SYSTOLIC BLOOD PRESSURE: 144 MMHG | DIASTOLIC BLOOD PRESSURE: 77 MMHG
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
## 2018-04-28 DIAGNOSIS — S30.861A TICK BITE OF ABDOMEN, INITIAL ENCOUNTER: Primary | ICD-10-CM

## 2018-04-28 DIAGNOSIS — Z91.89 RISK OF EXPOSURE TO LYME DISEASE: ICD-10-CM

## 2018-04-28 PROCEDURE — 99282 EMERGENCY DEPT VISIT SF MDM: CPT

## 2018-04-28 RX ORDER — DOXYCYCLINE 100 MG/1
100 CAPSULE ORAL 2 TIMES DAILY
Qty: 42 CAP | Refills: 0 | Status: SHIPPED | OUTPATIENT
Start: 2018-04-28 | End: 2018-05-19

## 2018-04-28 NOTE — DISCHARGE INSTRUCTIONS
Tick Bite: Care Instructions  Your Care Instructions    Ticks are small spiderlike animals. They bite to fasten themselves onto your skin and feed on your blood. Ticks can carry diseases. But most ticks do not carry diseases, and most tick bites do not cause serious health problems. Some people may have an allergic reaction to a tick bite. This reaction may be mild, with symptoms like itching and swelling. In rare cases, a severe allergic reaction may occur. Most of the time, all you need to do for a tick bite is relieve any symptoms you may have. Follow-up care is a key part of your treatment and safety. Be sure to make and go to all appointments, and call your doctor if you are having problems. It's also a good idea to know your test results and keep a list of the medicines you take. How can you care for yourself at home? · Put ice or a cold pack on the bite for 15 to 20 minutes once an hour. Put a thin cloth between the ice and your skin. · Try an over-the-counter medicine to relieve itching, redness, swelling, and pain. Be safe with medicines. Read and follow all instructions on the label. ¨ Take an antihistamine medicine, such as a nondrowsy one like loratadine (Claritin) or one that might make you sleepy like diphenhydramine (Benadryl). These medicines may help relieve itching, redness, and swelling. ¨ Use a spray of local anesthetic that contains benzocaine, such as Solarcaine. It may help relieve pain. If your skin reacts to the spray, stop using it. ¨ Put calamine lotion on the skin. It may help relieve itching. To avoid tick bites  · Avoid ticks:  ¨ Learn where ticks are found in your community, and stay away from those areas if possible. ¨ Cover as much of your body as possible when you work or play in grassy or wooded areas. ¨ Use insect repellents, such as products containing DEET. You can spray them on your skin.   ¨ Take steps to control ticks on your property if you live in an area where Lyme disease occurs. Clear leaves, brush, tall grasses, woodpiles, and stone fences from around your house and the edges of your yard or garden. This may help get rid of ticks. · When you come in from outdoors, check your body for ticks, including your groin, head, and underarms. The ticks may be about the size of a sesame seed. If no one else can help you check for ticks on your scalp, comb your hair with a fine-tooth comb. · If you find a tick, remove it quickly. Use tweezers to grasp the tick as close to its mouth (the part in your skin) as possible. Slowly pull the tick straight out-do not twist or yank-until its mouth releases from your skin. · Ticks can come into your house on clothing, outdoor gear, and pets. These ticks can fall off and attach to you. ¨ Check your clothing and outdoor gear. Remove any ticks you find. Then put your clothing in a clothes dryer on high heat for 1 hour to kill any ticks that might remain. ¨ Check your pets for ticks after they have been outdoors. · When hiking in the woods, carry a small dry jar or ziplock bag. If you find a tick on your body, remove the tick and put it in the jar or bag. Store the container in the freezer so you can give it to your doctor if symptoms develop. The tick can be tested to learn whether it is carrying the bacteria that cause Lyme disease. When should you call for help? Call 911 anytime you think you may need emergency care. For example, call if:  ? · You have symptoms of a severe allergic reaction. These may include:  ¨ Sudden raised, red areas (hives) all over your body. ¨ Swelling of the throat, mouth, lips, or tongue. ¨ Trouble breathing. ¨ Passing out (losing consciousness). Or you may feel very lightheaded or suddenly feel weak, confused, or restless. ?Call your doctor now or seek immediate medical care if:  ? · You have signs of infection, such as:  ¨ Increased pain, swelling, warmth, or redness around the bite.   ¨ Red streaks leading from the bite. ¨ Pus draining from the bite. ¨ A fever. ? Watch closely for changes in your health, and be sure to contact your doctor if:  ? · You develop a new rash. ? · You have joint pain. ? · You are very tired. ? · You have flu-like symptoms. ? · You have symptoms for more than 1 week. Where can you learn more? Go to http://imani-wm.info/. Enter I758 in the search box to learn more about \"Tick Bite: Care Instructions. \"  Current as of: March 20, 2017  Content Version: 11.4  © 7163-6332 [employer] Care instructions adapted under license by [employer] (which disclaims liability or warranty for this information). If you have questions about a medical condition or this instruction, always ask your healthcare professional. [address] any warranty or liability for your use of this information.

## 2018-04-28 NOTE — ED PROVIDER NOTES
Patient is a 67 y.o. female presenting with Insect Bite. The history is provided by the patient. Insect Bite   This is a new problem. The current episode started yesterday. The problem occurs constantly. The problem has been gradually worsening. Pertinent negatives include no chest pain, no abdominal pain, no headaches and no shortness of breath. Exacerbated by: Palpation. Nothing relieves the symptoms. She has tried nothing for the symptoms. Pt was outside yesterday and felt something \"itchy and irritating\" on her lower abd area, when she looked there was a tick attached. She removed it with tweezers. Since then the area has become erythematous and purple she reports. She denies any fever, chills, SOB, CP, URI s/s, abd pain, myalgias, arthralgias, weakness, numbness, tingling, or any other complaints at this time.      Past Medical History:   Diagnosis Date    Arthritis     Dr Javi Nolen; knees    Diabetes [facility]     [facility]; on insulin pump    FHx: breast cancer     Hemorrhoids     Hyperlipidemia     Hypertension     Macular degeneration     and retinopathy Dr Sabrina Costa t score -1.2 spine, -2.1 hip (11/10) improved from 2007/2003;  1.7 spine, -2.1 hip (2/16) [address]       Past Surgical History:   Procedure Laterality Date    Sydelle Plants      Dr Krystle Davis 2015   [place]      Dr. Talha Fernández 2005 negative; Dr Rina Davis 9/15 negative    HX ORTHOPAEDIC      DEXA 1.7 spine, -2.1 hip 2/16 [place]    US ABD COMP  8/02    negative         Family History:   Problem Relation Age of Onset    Cancer Mother      breast    Breast Cancer Mother        Social History     Social History    Marital status:      Spouse name: N/A    Number of children: 2    Years of education: N/A     Occupational History    ret  now part time [employer]      Social History Main Topics    Smoking status: Former Smoker     Quit date: 8/19/1995   Irma Delatorre Smokeless tobacco: Never Used    Alcohol use 0.5 oz/week     1 Glasses of wine per week      Comment: nightly    Drug use: No    Sexual activity: Not on file     Other Topics Concern    Not on file     Social History Narrative         ALLERGIES: Fosamax [alendronate]    Review of Systems   Constitutional: Negative for chills and fever. HENT: Negative for ear pain, rhinorrhea and sore throat. Eyes: Negative for pain and redness. Respiratory: Negative for cough and shortness of breath. Cardiovascular: Negative for chest pain. Gastrointestinal: Negative for abdominal pain, constipation, diarrhea, nausea and vomiting. Genitourinary: Negative for dysuria. Musculoskeletal: Negative for arthralgias, joint swelling and myalgias. Skin: Positive for color change and wound. Neurological: Negative for dizziness, light-headedness and headaches. Psychiatric/Behavioral: Negative. All other systems reviewed and are negative. Vitals:    04/28/18 1851   BP: 144/77   Pulse: 87   Resp: 20   Temp: 98.5 °F (36.9 °C)   SpO2: 98%   Weight: 68 kg (150 lb)   Height: 5' 2\" (1.575 m)            Physical Exam   Constitutional: She is oriented to person, place, and time. She appears well-developed and well-nourished. No distress. HENT:   Head: Normocephalic and atraumatic. Right Ear: Tympanic membrane, external ear and ear canal normal.   Left Ear: Tympanic membrane, external ear and ear canal normal.   Nose: Nose normal.   Mouth/Throat: Oropharynx is clear and moist and mucous membranes are normal.   Eyes: Conjunctivae and EOM are normal. Pupils are equal, round, and reactive to light. Neck: Normal range of motion. Neck supple. Cardiovascular: Normal rate, regular rhythm, normal heart sounds and intact distal pulses. Exam reveals no gallop and no friction rub. No murmur heard. Pulmonary/Chest: Effort normal and breath sounds normal. No respiratory distress. She has no wheezes. She has no rales. Abdominal: Soft. Bowel sounds are normal. There is no tenderness. Musculoskeletal: Normal range of motion. Neurological: She is alert and oriented to person, place, and time. Skin: Skin is warm and dry. She is not diaphoretic. There is erythema. No induration, swelling, fluctuance, TTP noted. Psychiatric: She has a normal mood and affect. Her behavior is normal. Judgment and thought content normal.   Nursing note and vitals reviewed. MDM  Number of Diagnoses or Management Options  Risk of exposure to Lyme disease: new and requires workup  Tick bite of abdomen, initial encounter: new and requires workup  Diagnosis management comments: DDx: tick bite, lyme, eczema/allergic dermatitis/contact dermatitis, erysipelas, bug bites, abscess, cellulitis, viral exanthem, scabies, Scarlet fever rash, Fifth disease, measles, rubella, rubeola, skin eruption associated with life-threatening condition    Rash c/w with erythema migrans, concerning for poss Lyme disease. Will place on prophylaxis of doxy 100 mg BID x 21 days. Pt has PCP, instructed pt to f/u with PCP in 48 hours for wound check. Pt given strict ED return precautions. Pt results have been reviewed with the patient and any family present. They have been counseled regarding diagnosis, treatment, and plan. They verbally convey understanding and agreement of the signs, symptoms, diagnosis, treatment and prognosis and additionally agrees to follow up as discussed. They also agree with the care-plan and convey that all of their questions have been answered. I have also provided discharge instructions for them that include: educational information regarding their diagnosis and treatment, and list of reasons why they would want to return to the ED prior to their follow-up appointment, should their condition change.    Kitty Arias PA-C  7:16 PM        Amount and/or Complexity of Data Reviewed  Review and summarize past medical records: yes  Discuss the patient with other providers: yes    Risk of Complications, Morbidity, and/or Mortality  Presenting problems: low  Diagnostic procedures: low  Management options: low    Patient Progress  Patient progress: stable        ED Course       Procedures    Diagnosis:   1. Tick bite of abdomen, initial encounter    2. Risk of exposure to Lyme disease          Disposition: Discharge to home. Follow-up Information     Follow up With Details Comments [place] [address] EMERGENCY DEPT  As needed, If symptoms worsen [address] Shanna Handley MD Go in 2 days For wound re-check [id number]  [phone]            Patient's Medications   Start Taking    DOXYCYCLINE (MONODOX) 100 MG CAPSULE    Take 1 Cap by mouth two (2) times a day for 21 days. Continue Taking    EVENING PRIMROSE  MG CAP    Take 500 mg by mouth nightly. INSULIN LISPRO (HUMALOG) 100 UNIT/ML INJECTION    by Continuous Infusion route. On insulin pump managed by DI    LISINOPRIL (PRINIVIL, ZESTRIL) 10 MG TABLET    Take  by mouth daily. RALOXIFENE (EVISTA) 60 MG TABLET    Take  by mouth daily. SIMVASTATIN (ZOCOR) 20 MG TABLET    Take  by mouth nightly.      These Medications have changed    No medications on file   Stop Taking    No medications on file

## 2018-04-30 ENCOUNTER — OFFICE VISIT (OUTPATIENT)
Dept: INTERNAL MEDICINE CLINIC | Age: 73
End: 2018-04-30

## 2018-04-30 ENCOUNTER — HOSPITAL ENCOUNTER (OUTPATIENT)
Dept: LAB | Age: 73
Discharge: HOME | End: 2018-04-30
Payer: MEDICARE

## 2018-04-30 ENCOUNTER — PATIENT OUTREACH (OUTPATIENT)
Dept: INTERNAL MEDICINE CLINIC | Age: 73
End: 2018-04-30

## 2018-04-30 VITALS
DIASTOLIC BLOOD PRESSURE: 64 MMHG | TEMPERATURE: 98.6 F | SYSTOLIC BLOOD PRESSURE: 130 MMHG | BODY MASS INDEX: 27.9 KG/M2 | HEART RATE: 95 BPM | HEIGHT: 62 IN | OXYGEN SATURATION: 97 % | RESPIRATION RATE: 14 BRPM | WEIGHT: 151.6 LBS

## 2018-04-30 DIAGNOSIS — S30.861A TICK BITE OF GROIN, INITIAL ENCOUNTER: Primary | ICD-10-CM
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
## 2018-04-30 DIAGNOSIS — S30.861A TICK BITE OF GROIN, INITIAL ENCOUNTER: ICD-10-CM

## 2018-04-30 PROCEDURE — 36415 COLL VENOUS BLD VENIPUNCTURE: CPT | Performed by: NURSE PRACTITIONER

## 2018-04-30 PROCEDURE — 86617 LYME DISEASE ANTIBODY: CPT | Performed by: NURSE PRACTITIONER

## 2018-04-30 NOTE — PROGRESS NOTES
Zoran Conn is a 67 y.o.  female and presents with    Chief Complaint   Patient presents with    Insect Bite     Patient here for tick bit on lower stomach closer to vagina. Patient reports the area was purple and bright red on friday 27th. Patient stated ED advised her that the whiteness to it was a sign of lymes. Subjective:  HPI   Mrs. Senia Baig presents today with complaints of removal of a tick on Friday. She reports site with itchy, rubbed area, felt bump, was able to remove entire body of tick. She reports rash to right lower abdomen in a bulls eye presentation, went to ED on Saturday night and was told to follow up on Monday as needed labs. She was started on Doxycycline and taking Benadryl for pruritus. States the ED did look closely at site to make sure complete removal of entire tick. Additional Concerns: none     ROS   Review of Systems   Constitutional: Negative for chills, fever and malaise/fatigue. Respiratory: Negative. Cardiovascular: Negative. Gastrointestinal: Negative. Musculoskeletal: Negative. Allergies   Allergen Reactions    Fosamax [Alendronate] Other (comments)     GI upset       Current Outpatient Prescriptions   Medication Sig Dispense Refill    doxycycline (MONODOX) 100 mg capsule Take 1 Cap by mouth two (2) times a day for 21 days. 42 Cap 0    insulin lispro (HUMALOG) 100 unit/mL injection by Continuous Infusion route. On insulin pump managed by DI      evening primrose oil 500 mg cap Take 500 mg by mouth nightly.  raloxifene (EVISTA) 60 mg tablet Take  by mouth daily.  simvastatin (ZOCOR) 20 mg tablet Take  by mouth nightly.  lisinopril (PRINIVIL, ZESTRIL) 10 mg tablet Take  by mouth daily.            Social History     Social History    Marital status:      Spouse name: N/A    Number of children: 2    Years of education: N/A     Occupational History    ret  now part time [employer]      Social History Main Topics    Smoking status: Former Smoker     Quit date: 8/19/1995    Smokeless tobacco: Never Used    Alcohol use 0.5 oz/week     1 Glasses of wine per week      Comment: nightly    Drug use: No    Sexual activity: Not on file     Other Topics Concern    Not on file     Social History Narrative       Past Medical History:   Diagnosis Date    Arthritis     Dr Freddie Ogden; knees    Diabetes mellitus ([facility] [address]     [facility]; on insulin pump    FHx: breast cancer     Hemorrhoids     Hyperlipidemia     Hypertension     Macular degeneration     and retinopathy Dr Quiles Boys t score -1.2 spine, -2.1 hip (11/10) improved from 2007/2003;  1.7 spine, -2.1 hip (2/16) Strelitz       Past Surgical History:   Procedure Laterality Date    HX CATARACT REMOVAL      Dr Jim Sanz 2015   Jarold Isabella Steiner 2005 negative; Dr Marielle Morgan 9/15 negative    HX ORTHOPAEDIC      DEXA 1.7 spine, -2.1 hip 2/16 Strelitz    US ABD COMP  8/02    negative       Family History   Problem Relation Age of Onset    Cancer Mother      breast    Breast Cancer Mother        Objective:  Vitals:    04/30/18 0929   BP: 130/64   Pulse: 95   Resp: 14   Temp: 98.6 °F (37 °C)   TempSrc: Oral   SpO2: 97%   Weight: 151 lb 9.6 oz (68.8 kg)   Height: 5' 2\" (1.575 m)   PainSc:   0 - No pain       LABS   Results for orders placed or performed during the hospital encounter of 03/15/18   CBC W/O DIFF   Result Value Ref Range    WBC 4.5 (L) 4.6 - 13.2 K/uL    RBC 4.53 4.20 - 5.30 M/uL    HGB 14.7 12.0 - 16.0 g/dL    HCT 45.1 (H) 35.0 - 45.0 %    MCV 99.6 (H) 74.0 - 97.0 FL    MCH 32.5 24.0 - 34.0 PG    MCHC 32.6 31.0 - 37.0 g/dL    RDW 14.3 11.6 - 14.5 %    PLATELET 664 830 - 304 K/uL    MPV 10.6 9.2 - 11.8 FL   LIPID PANEL   Result Value Ref Range    LIPID PROFILE          Cholesterol, total 135 <200 MG/DL    Triglyceride 52 <150 MG/DL    HDL Cholesterol 90 (H) 40 - 60 MG/DL    LDL, calculated 34.6 0 - 100 MG/DL    VLDL, calculated 10.4 MG/DL    CHOL/HDL Ratio 1.5 0 - 5.0     METABOLIC PANEL, COMPREHENSIVE   Result Value Ref Range    Sodium 143 136 - 145 mmol/L    Potassium 4.2 3.5 - 5.5 mmol/L    Chloride 107 100 - 108 mmol/L    CO2 28 21 - 32 mmol/L    Anion gap 8 3.0 - 18 mmol/L    Glucose 84 74 - 99 mg/dL    BUN 14 7.0 - 18 MG/DL    Creatinine 0.68 0.6 - 1.3 MG/DL    BUN/Creatinine ratio 21 (H) 12 - 20      GFR est AA >60 >60 ml/min/1.73m2    GFR est non-AA >60 >60 ml/min/1.73m2    Calcium 8.6 8.5 - 10.1 MG/DL    Bilirubin, total 0.6 0.2 - 1.0 MG/DL    ALT (SGPT) 20 13 - 56 U/L    AST (SGOT) 13 (L) 15 - 37 U/L    Alk. phosphatase 90 45 - 117 U/L    Protein, total 7.3 6.4 - 8.2 g/dL    Albumin 3.9 3.4 - 5.0 g/dL    Globulin 3.4 2.0 - 4.0 g/dL    A-G Ratio 1.1 0.8 - 1.7     MICROALBUMIN, UR, RAND W/ MICROALBUMIN/CREA RATIO   Result Value Ref Range    Microalbumin,urine random 1.20 0 - 3.0 MG/DL    Creatinine, urine 105.10 30 - 125 mg/dL    Microalbumin/Creat ratio (mg/g creat) 11 0 - 30 mg/g   HEMOGLOBIN A1C WITH EAG   Result Value Ref Range    Hemoglobin A1c 7.9 (H) 4.2 - 5.6 %    Est. average glucose 180 mg/dL       TESTS  none    PE  Physical Exam   Constitutional: She is oriented to person, place, and time. She appears well-developed and well-nourished. No distress. HENT:   Head: Normocephalic and atraumatic. Eyes: Conjunctivae and EOM are normal.   Neck: Normal range of motion. Neck supple. Cardiovascular: Normal rate, regular rhythm, normal heart sounds and intact distal pulses. Pulmonary/Chest: Effort normal and breath sounds normal.   Abdominal: Soft. Bowel sounds are normal.   Musculoskeletal: Normal range of motion. Neurological: She is alert and oriented to person, place, and time. Skin: Skin is warm and dry. Rash noted. Rash is urticarial. She is not diaphoretic. Psychiatric: She has a normal mood and affect.  Her behavior is normal. Judgment and thought content normal. Vitals reviewed. Assessment/Plan:    1. Tick bite exposure- patient was told by ED provider that needs labs completed, up to date reports unnecessary if erythema migrans present and patient reports past exposure. However patient would like confirmation. She is already on Doxycycline, instructed to take until completed. Follow up PRN. Discussed monitor for secondary infection and report if noted. Lab review: orders written for new lab studies as appropriate; see orders    Today's Visit:   Diagnoses and all orders for this visit:    1. Tick bite of groin, initial encounter  -     LYME AB, IGG & IGM BY WB; Future      Health Maintenance: up to date. I have discussed the diagnosis with the patient and the intended plan as seen in the above orders. The patient has received an after-visit summary and questions were answered concerning future plans. I have discussed medication side effects and warnings with the patient as well. I have reviewed the plan of care with the patient, accepted their input and they are in agreement with the treatment goals. Follow-up Disposition: Not on File   More than 1/2 of this 15 minute visit was spent in counseling and coordination of care, as described above.     TYRA Tilley  Internist of [address], [address] [place] [address].  Phone: [phone]  Fax: [phone]

## 2018-04-30 NOTE — PROGRESS NOTES
ED Discharge Follow-Up    Date/Time:  4/30/2018 10:38 AM    Patient was admitted to [facility] ED on 4/28/2018 and discharged on 4/28/2018 for tick bite. Presenting symptoms: tick bite on abodminal    Patient has already follow up in the office with Rodri Ashley NP  today. Medication:   New medications at discharge include:  doxycycline 100 mg capsule    Future Appointments  Date Time Provider [place]   3/21/2019 8:15 AM [facility] NURSE VISIT [facility] SCHED   3/28/2019 1:00 PM Andrews Fair MD [facility] SCHED        Goals Addressed             Most Recent     COMPLETED: Attends follow-up appointments as directed. On track (4/30/2018)             Follow up with PCP    4/23/2018 Patient attended their post discharge follow up appointment with Rodri Ashley NP  as scheduled.             Reduce ED Utilization                No admissions within 30 days post discharge, 4/28/2018

## 2018-04-30 NOTE — PROGRESS NOTES
1. Have you been to the ER, urgent care clinic or hospitalized since your last visit? YES.     2. Have you seen or consulted any other health care providers outside of the [address] [place] [address] since your last visit (Include any pap smears or colon screening)? NO      Do you have an Advanced Directive? NO    Would you like information on Advanced Directives?  NO

## 2018-04-30 NOTE — MR AVS SNAPSHOT
[address] [facility] [address] 
 
 
 [address], [address] [facility] 
[phone] Patient: Gal Dey MRN: [id number] HEATHER:[birth year] Visit Information Date & Time Provider Department Dept. Phone Encounter #  
 4/30/2018  9:30 AM Sarina Iqbal NP [facility] [id number] Your Appointments 3/21/2019  8:15 AM  
LAB with [facility] NURSE VISIT [facility] ([facility]) Appt Note: labs [address], [address] [facility] [address] [place]  
  
   
 [address] [facility]  
  
    
 3/28/2019  1:00 PM  
PHYSICAL with Corina Reyes MD  
[facility]) Appt Note: [address]  
 [address] [address] [address] [place]  
  
   
 [address], [facility] Upcoming Health Maintenance Date Due  
 EYE EXAM RETINAL OR DILATED Q1 3/22/2019* Influenza Age 5 to Adult 8/1/2018 HEMOGLOBIN A1C Q6M 9/15/2018 MICROALBUMIN Q1 3/15/2019 LIPID PANEL Q1 3/15/2019 FOOT EXAM Q1 3/22/2019 MEDICARE YEARLY EXAM 3/23/2019 BREAST CANCER SCRN MAMMOGRAM 10/12/2019 GLAUCOMA SCREENING Q2Y 1/29/2020 DTaP/Tdap/Td series (2 - Td) 5/24/2022 COLONOSCOPY 9/25/2025 *Topic was postponed. The date shown is not the original due date. Allergies as of 4/30/2018  Review Complete On: 4/30/2018 By: Nelida Chiang Severity Noted Reaction Type Reactions Fosamax [Alendronate]    Other (comments) GI upset Current Immunizations  Reviewed on 3/13/2017 Name Date Influenza High Dose Vaccine PF 9/29/2017, 10/9/2015 Influenza Vaccine 9/11/2013 Influenza Vaccine Whole 10/3/2012, 9/20/2011 Pneumococcal Conjugate (PCV-13) 7/10/2015  9:12 AM  
 TDAP Vaccine 5/24/2012 ZZZ-RETIRED (DO NOT USE) Pneumococcal Vaccine (Unspecified Type) 1/18/2008, 1/1/1999 Zoster 2/26/2008 Not reviewed this visit You Were Diagnosed With   
  
 Codes Comments Tick bite of groin, initial encounter    -  Primary ICD-10-CM: G33.067M, N57. Winston Romero ICD-9-CM: 911.4, E906.4 Vitals BP Pulse Temp Resp Height(growth percentile) Weight(growth percentile) 130/64 (BP 1 Location: Left arm, BP Patient Position: Sitting) 95 98.6 °F (37 °C) (Oral) 14 5' 2\" (1.575 m) 151 lb 9.6 oz (68.8 kg) SpO2 BMI OB Status Smoking Status 97% 27.73 kg/m2 Postmenopausal Former Smoker Vitals History BMI and BSA Data Body Mass Index Body Surface Area  
 27.73 kg/m 2 1.73 m 2 Your Updated Medication List  
  
   
This list is accurate as of 4/30/18 10:21 AM.  Always use your most recent med list.  
  
  
  
  
 doxycycline 100 mg capsule Commonly known as:  Scarlet Lat Take 1 Cap by mouth two (2) times a day for 21 days. evening primrose oil 500 mg Cap Take 500 mg by mouth nightly. EVISTA 60 mg tablet Generic drug:  raloxifene Take  by mouth daily. HumaLOG U-100 Insulin 100 unit/mL injection Generic drug:  insulin lispro  
by Continuous Infusion route. On insulin pump managed by DI  
  
 lisinopril 10 mg tablet Commonly known as:  Diann Kelly Take  by mouth daily. ZOCOR 20 mg tablet Generic drug:  simvastatin Take  by mouth nightly. Introducing [facility] [place] & HEALTH SERVICES! Dear Daksha Calderon: 
Thank you for requesting a [employer] account. Our records indicate that you already have an active [employer] account. You can access your account anytime at [email or web address] Did you know that you can access your hospital and ER discharge instructions at any time in [employer]? You can also review all of your test results from your hospital stay or ER visit. Additional Information If you have questions, please visit the Frequently Asked Questions section of the [employer] website at [email or web address] Remember, [employer] is NOT to be used for urgent needs. For medical emergencies, dial 911. Now available from your iPhone and Android! Please provide this summary of care documentation to your next provider. Lyme Disease Testing Disclaimer:   
 § 47.6-2263.5. (Expires July 1, 2018) Lyme disease testing information disclosure. A. Every licensee or his in-office designee who orders a laboratory test for the presence of Lyme disease shall provide to the patient or his legal representative the following written information: \"ACCORDING TO THE CENTERS FOR DISEASE CONTROL AND PREVENTION, AS OF 2011 LYME DISEASE IS THE SIXTH FASTEST GROWING DISEASE IN THE [place]. YOUR HEALTH CARE PROVIDER HAS ORDERED A LABORATORY TEST FOR THE PRESENCE OF LYME DISEASE FOR YOU. CURRENT LABORATORY TESTING FOR LYME DISEASE CAN BE PROBLEMATIC AND STANDARD LABORATORY TESTS OFTEN RESULT IN FALSE NEGATIVE AND FALSE POSITIVE RESULTS, AND IF DONE TOO EARLY, YOU MAY NOT HAVE PRODUCED ENOUGH ANTIBODIES TO BE CONSIDERED POSITIVE BECAUSE YOUR IMMUNE RESPONSE REQUIRES TIME TO DEVELOP ANTIBODIES. IF YOU ARE TESTED FOR LYME DISEASE, AND THE RESULTS ARE NEGATIVE, THIS DOES NOT NECESSARILY MEAN YOU DO NOT HAVE LYME DISEASE. IF YOU CONTINUE TO EXPERIENCE SYMPTOMS, YOU SHOULD CONTACT YOUR HEALTH CARE PROVIDER AND INQUIRE ABOUT THE APPROPRIATENESS OF RETESTING OR ADDITIONAL TREATMENT. \"  
B. Licensees shall be immune from civil liability for the provision of the written information required by this section absent gross negligence or willful misconduct. Your primary care clinician is listed as Rayna Conrad. If you have any questions after today's visit, please call [phone].

## 2018-05-02 LAB
B BURGDOR IGG PATRN SER IB-IMP: NEGATIVE
B BURGDOR IGM PATRN SER IB-IMP: NEGATIVE
B BURGDOR18KD IGG SER QL IB: NORMAL
B BURGDOR23KD IGG SER QL IB: NORMAL
B BURGDOR23KD IGM SER QL IB: NORMAL
B BURGDOR28KD IGG SER QL IB: NORMAL
B BURGDOR30KD IGG SER QL IB: NORMAL
B BURGDOR39KD IGG SER QL IB: NORMAL
B BURGDOR39KD IGM SER QL IB: NORMAL
B BURGDOR41KD IGG SER QL IB: NORMAL
B BURGDOR41KD IGM SER QL IB: NORMAL
B BURGDOR45KD IGG SER QL IB: NORMAL
B BURGDOR58KD IGG SER QL IB: NORMAL
B BURGDOR66KD IGG SER QL IB: NORMAL
B BURGDOR93KD IGG SER QL IB: NORMAL

## 2018-05-03 ENCOUNTER — DOCUMENTATION ONLY (OUTPATIENT)
Dept: INTERNAL MEDICINE CLINIC | Age: 73
End: 2018-05-03

## 2018-05-03 ENCOUNTER — TELEPHONE (OUTPATIENT)
Dept: INTERNAL MEDICINE CLINIC | Age: 73
End: 2018-05-03

## 2018-05-03 NOTE — TELEPHONE ENCOUNTER
----- Message from Kayleen Menendez NP sent at 5/3/2018  9:06 AM EDT -----  Please let her know labs were negative for lyme disease, however finish antibiotic until complete

## 2018-05-31 ENCOUNTER — PATIENT OUTREACH (OUTPATIENT)
Dept: INTERNAL MEDICINE CLINIC | Age: 73
End: 2018-05-31

## 2018-06-11 ENCOUNTER — TELEPHONE (OUTPATIENT)
Dept: INTERNAL MEDICINE CLINIC | Age: 73
End: 2018-06-11

## 2018-06-11 NOTE — TELEPHONE ENCOUNTER
Pt called and req a Dr to Dr referral to see Dr. Rubin Alexander IEA#[id number], EJ#[phone], SWI#[phone], pt wants to be seen because she thinks she has carpal tunnel in her left wrist and she can'tt form a fist in her right hand, RD

## 2018-06-11 NOTE — TELEPHONE ENCOUNTER
Suggest ov w pa/np for eval as I have no openings  May need ortho instead of neuro  Will send referral if she insists

## 2018-06-15 ENCOUNTER — TELEPHONE (OUTPATIENT)
Dept: INTERNAL MEDICINE CLINIC | Age: 73
End: 2018-06-15

## 2018-06-15 ENCOUNTER — OFFICE VISIT (OUTPATIENT)
Dept: INTERNAL MEDICINE CLINIC | Age: 73
End: 2018-06-15

## 2018-06-15 VITALS
TEMPERATURE: 98.3 F | HEART RATE: 97 BPM | HEIGHT: 62 IN | BODY MASS INDEX: 28.01 KG/M2 | SYSTOLIC BLOOD PRESSURE: 110 MMHG | WEIGHT: 152.2 LBS | OXYGEN SATURATION: 98 % | RESPIRATION RATE: 14 BRPM | DIASTOLIC BLOOD PRESSURE: 64 MMHG

## 2018-06-15 DIAGNOSIS — M79.89 BILATERAL HAND SWELLING: Primary | ICD-10-CM

## 2018-06-15 DIAGNOSIS — R29.898 DECREASED GRIP STRENGTH: ICD-10-CM

## 2018-06-15 DIAGNOSIS — M79.641 BILATERAL HAND PAIN: ICD-10-CM

## 2018-06-15 DIAGNOSIS — M79.642 BILATERAL HAND PAIN: ICD-10-CM

## 2018-06-15 NOTE — MR AVS SNAPSHOT
[address] [facility] [address] 
 
 
 [address], [address] [facility] 
[phone] Patient: Savanna Braswell MRN: [id number] QOK:[birth year] Visit Information Date & Time Provider Department Dept. Phone Encounter #  
 6/15/2018  8:00 AM Justin Lei NP [id number] Your Appointments 3/21/2019  8:15 AM  
LAB with [facility] NURSE VISIT [facility] ([facility] Appt Note: labs [address], [address] [facility] [address] [place]  
  
   
 [address], [address]  
  
    
 3/28/2019  1:00 PM  
PHYSICAL with Nenita De La Torre MD  
[facility] Appt Note: [address]  
 [address] [place]  
  
   
 [address], [address] Upcoming Health Maintenance Date Due  
 EYE EXAM RETINAL OR DILATED Q1 3/22/2019* Influenza Age 5 to Adult 8/1/2018 HEMOGLOBIN A1C Q6M 9/15/2018 MICROALBUMIN Q1 3/15/2019 LIPID PANEL Q1 3/15/2019 FOOT EXAM Q1 3/22/2019 MEDICARE YEARLY EXAM 3/23/2019 BREAST CANCER SCRN MAMMOGRAM 10/12/2019 GLAUCOMA SCREENING Q2Y 1/29/2020 DTaP/Tdap/Td series (2 - Td) 5/24/2022 COLONOSCOPY 9/25/2025 *Topic was postponed. The date shown is not the original due date. Allergies as of 6/15/2018  Review Complete On: 6/15/2018 By: Nikhil Jim Severity Noted Reaction Type Reactions Fosamax [Alendronate]    Other (comments) GI upset Current Immunizations  Reviewed on 3/13/2017 Name Date Influenza High Dose Vaccine PF 9/29/2017, 10/9/2015 Influenza Vaccine 9/11/2013 Influenza Vaccine Whole 10/3/2012, 9/20/2011 Pneumococcal Conjugate (PCV-13) 7/10/2015  9:12 AM  
 TDAP Vaccine 5/24/2012 ZZZ-RETIRED (DO NOT USE) Pneumococcal Vaccine (Unspecified Type) 1/18/2008, 1/1/1999 Zoster 2/26/2008 Not reviewed this visit Vitals BP Pulse Temp Resp Height(growth percentile) Weight(growth percentile) 110/64 (BP 1 Location: Left arm, BP Patient Position: Sitting) 97 98.3 °F (36.8 °C) (Oral) 14 5' 2\" (1.575 m) 152 lb 3.2 oz (69 kg) SpO2 BMI OB Status Smoking Status 98% 27.84 kg/m2 Postmenopausal Former Smoker Vitals History BMI and BSA Data Body Mass Index Body Surface Area  
 27.84 kg/m 2 1.74 m 2 Your Updated Medication List  
  
   
This list is accurate as of 6/15/18  8:46 AM.  Always use your most recent med list.  
  
  
  
  
 evening primrose oil 500 mg Cap Take 500 mg by mouth nightly. EVISTA 60 mg tablet Generic drug:  raloxifene Take  by mouth daily. HumaLOG U-100 Insulin 100 unit/mL injection Generic drug:  insulin lispro  
by Continuous Infusion route. On insulin pump managed by DI  
  
 lisinopril 10 mg tablet Commonly known as:  Burma Fairy Take  by mouth daily. ZOCOR 20 mg tablet Generic drug:  simvastatin Take  by mouth nightly. Introducing [facility] & HEALTH SERVICES! Dear Charlene Siegel: 
Thank you for requesting a [employer] account. Our records indicate that you already have an active [employer] account. You can access your account anytime at [email or web address] Did you know that you can access your hospital and ER discharge instructions at any time in [employer]? You can also review all of your test results from your hospital stay or ER visit. Additional Information If you have questions, please visit the Frequently Asked Questions section of the [employer] website at [email or web address] Remember, [employer] is NOT to be used for urgent needs. For medical emergencies, dial 911. Now available from your iPhone and Android! Please provide this summary of care documentation to your next provider. Your primary care clinician is listed as Bailey Slade. If you have any questions after today's visit, please call [phone].

## 2018-06-15 NOTE — PROGRESS NOTES
Bubba De Guzman is a 67 y.o.  female and presents with    Chief Complaint   Patient presents with    Hand Pain     Patient here for both hand pain. Patient reports it starts at the wrist and goes to the finger tips and she is unable to make a fist with both hands. x 6 months       Subjective:  HPI   Mrs. Joellyn Bloch presents today with complaints of bilateral hand \"discomfort\". She reports ongoing for about 6 months, symptoms of a gradual nature. She reports had to get rings enlarged due to swelling in all fingers. Pain with hyperextending the thumbs and pointer finger on left hand and with intermittent \"catching sensation\" to left pointer finger. The  strength is decreased as she reports an example of unable to peel a potato due to inability to  a paring knife. Intermittently right hand will get numb, mostly at night. She is a side sleeper, drove a school bus for 31 years. She is a  currently works Monday and Tuesday, but denies typing. She is predominantly left handed, but reports she is ambidextrous. She reports scheduled with a hand specialist? in [place] for June 28th however reports needs a referral.     Additional Concerns: none     ROS   Review of Systems   Constitutional: Negative for chills and fever. Respiratory: Negative. Cardiovascular: Negative. Musculoskeletal: Negative for back pain, joint pain, myalgias and neck pain. Skin: Negative for itching and rash. Neurological: Positive for sensory change. Negative for tingling. Allergies   Allergen Reactions    Fosamax [Alendronate] Other (comments)     GI upset       Current Outpatient Prescriptions   Medication Sig Dispense Refill    insulin lispro (HUMALOG) 100 unit/mL injection by Continuous Infusion route. On insulin pump managed by DI      evening primrose oil 500 mg cap Take 500 mg by mouth nightly.  raloxifene (EVISTA) 60 mg tablet Take  by mouth daily.         simvastatin (ZOCOR) 20 mg tablet Take  by mouth nightly.  lisinopril (PRINIVIL, ZESTRIL) 10 mg tablet Take  by mouth daily. Social History     Social History    Marital status:      Spouse name: N/A    Number of children: 2    Years of education: N/A     Occupational History    ret  now part time [employer]      Social History Main Topics    Smoking status: Former Smoker     Quit date: 8/19/1995    Smokeless tobacco: Never Used    Alcohol use 0.5 oz/week     1 Glasses of wine per week      Comment: nightly    Drug use: No    Sexual activity: Not on file     Other Topics Concern    Not on file     Social History Narrative       Past Medical History:   Diagnosis Date    Arthritis     Dr Aly Sanon; knees    Diabetes mellitus ([facility] [address]     [facility]; on insulin pump    FHx: breast cancer     Hemorrhoids     Hyperlipidemia     Hypertension     Macular degeneration     and retinopathy Dr Leigh Ann Lawson t score -1.2 spine, -2.1 hip (11/10) improved from 2007/2003;  1.7 spine, -2.1 hip (2/16) [address]       Past Surgical History:   Procedure Laterality Date    Virginia Elliott 2015   Olaf Grubbs Graft 2005 negative; Dr Head Farm 9/15 negative    HX ORTHOPAEDIC      DEXA 1.7 spine, -2.1 hip 2/16 [facility]    US ABD COMP  8/02    negative       Family History   Problem Relation Age of Onset    Cancer Mother      breast    Breast Cancer Mother        Objective:  Vitals:    06/15/18 0800   BP: 110/64   Pulse: 97   Resp: 14   Temp: 98.3 °F (36.8 °C)   TempSrc: Oral   SpO2: 98%   Weight: 152 lb 3.2 oz (69 kg)   Height: 5' 2\" (1.575 m)   PainSc:   0 - No pain       LABS   none    TESTS  none    PE  Physical Exam   Constitutional: She is oriented to person, place, and time. She appears well-developed and well-nourished. No distress. HENT:   Head: Normocephalic and atraumatic.    Musculoskeletal:   Able to reproduce \"discomfort\" with pressure exerted below the 4th finger of right hand. Nodules noted to the palm and posterior thumb of left hand, no pain reproduced on examination today. Heberden's nodes to bilateral thumbs.  strength decreased bilaterally but felt stronger right hand versus left today. Good capillary refill. Mild hyperreflexia bilaterally. Negative Tinel and Phalen. Neurological: She is alert and oriented to person, place, and time. Skin: Skin is warm and dry. No rash noted. She is not diaphoretic. No erythema. No pallor. Psychiatric: She has a normal mood and affect. Her behavior is normal. Judgment and thought content normal.   Vitals reviewed. Assessment/Plan:    1. Bilateral hand discomfort/decreased  strength- Referral to Dr. Yoel Meraz, appt made for June 27th. Patient notified by [employer]. Recommended to purchase hand braces for bilateral hands to be worn at night as possible carpal tunnel syndrome. Discussed possible need for EMG study, will defer to specialist. Of note she is a Type 2 Diabetic on insulin pump followed by Endocrinology, seems controlled. Lab review: no lab studies available for review at time of visit    Today's Visit:   Diagnoses and all orders for this visit:    1. Bilateral hand swelling  -     REFERRAL TO ORTHOPEDICS    2. Bilateral hand pain  -     REFERRAL TO ORTHOPEDICS    3. Decreased  strength  -     REFERRAL TO ORTHOPEDICS      Health Maintenance: Up to date. I have discussed the diagnosis with the patient and the intended plan as seen in the above orders. The patient has received an after-visit summary and questions were answered concerning future plans. I have discussed medication side effects and warnings with the patient as well. I have reviewed the plan of care with the patient, accepted their input and they are in agreement with the treatment goals.        Follow-up Disposition: Not on File   More than 1/2 of this 25 minute visit was spent in counseling and coordination of care, as described above.     TYRA Cortés  Internist of [address] [place] [address], [facility] [address]  Phone: [phone]  Fax: [phone]

## 2018-06-15 NOTE — TELEPHONE ENCOUNTER
LMTCB    Tried to call patient to advised referral to Hand Specialist with Dr. Karishma Zhang group.   Appointment on 06/27/2018 Wednesday at 1:00pm, Patient to arrive at 12:30pm.  Patient will be seeing Dr. Luiz Hopson  Address: [address] [place] [address]  Telephone: [phone]

## 2018-06-15 NOTE — PROGRESS NOTES
1. Have you been to the ER, urgent care clinic or hospitalized since your last visit? YES.     2. Have you seen or consulted any other health care providers outside of the [address] [place] [address] since your last visit (Include any pap smears or colon screening)? NO      Do you have an Advanced Directive? NO    Would you like information on Advanced Directives?  NO

## 2018-08-16 LAB
HBA1C MFR BLD HPLC: 7.1 %
LDL-C, EXTERNAL: 54

## 2018-10-05 ENCOUNTER — TELEPHONE (OUTPATIENT)
Dept: INTERNAL MEDICINE CLINIC | Age: 73
End: 2018-10-05

## 2018-10-08 NOTE — TELEPHONE ENCOUNTER
Spoke with patient she stated test was done by [facility]. Pt stated she had a cloud on spine that [facility] wanted Mohit Jacobs to review.  Pt stated she would contact office for them to send results

## 2018-10-25 ENCOUNTER — HOSPITAL ENCOUNTER (OUTPATIENT)
Dept: MAMMOGRAPHY | Age: 73
Discharge: HOME | End: 2018-10-25
Attending: INTERNAL MEDICINE
Payer: MEDICARE

## 2018-10-25 DIAGNOSIS — Z12.31 ENCOUNTER FOR SCREENING MAMMOGRAM FOR MALIGNANT NEOPLASM OF BREAST: ICD-10-CM

## 2018-10-25 PROCEDURE — 77067 SCR MAMMO BI INCL CAD: CPT

## 2018-11-19 NOTE — TELEPHONE ENCOUNTER
Internal Medicine Internal Medicine Internal Medicine Internal Medicine Internal Medicine Internal Medicine Internal Medicine Internal Medicine Internal Medicine Nephrology Nephrology Nephrology Orthopedics Orthopedics Orthopedics Orthopedics Orthopedics appmnt made with SL, done Internal Medicine Nephrology

## 2019-03-17 NOTE — PROGRESS NOTES
This is a Subsequent Medicare Annual Wellness Visit      I have reviewed the patient's medical history in detail and updated the computerized patient record. History     Past Medical History:   Diagnosis Date    Arthritis     Dr Benjaman Kocher; knees    Diabetes mellitus ([address]     [facility]; on insulin pump    FHx: breast cancer     Hemorrhoids     Hyperlipidemia     Hypertension     Macular degeneration     and retinopathy Dr Keith Lombardo t score -1.2 spine, -2.1 hip (11/10) improved from ;  1.7 spine, -2.1 hip () [address]      Past Surgical History:   Procedure Laterality Date    Oral Muir    Denson Sofia      Dr. Roberta Whipple  negative; Dr Lynn Razo 9/15 negative    HX ORTHOPAEDIC      DEXA 1.7 spine, -2.1 hip  [facility]    US ABD COMP      negative     Current Outpatient Medications   Medication Sig Dispense Refill    insulin lispro (HUMALOG) 100 unit/mL injection by Continuous Infusion route. On insulin pump managed by DI      evening primrose oil 500 mg cap Take 500 mg by mouth nightly.  raloxifene (EVISTA) 60 mg tablet Take  by mouth daily.  simvastatin (ZOCOR) 20 mg tablet Take  by mouth nightly.  lisinopril (PRINIVIL, ZESTRIL) 10 mg tablet Take  by mouth daily. Allergies   Allergen Reactions    Fosamax [Alendronate] Other (comments)     GI upset     Family History   Problem Relation Age of Onset    Cancer Mother         breast    Breast Cancer Mother      Social History     Tobacco Use    Smoking status: Former Smoker     Last attempt to quit: 1995     Years since quittin.5    Smokeless tobacco: Never Used   Substance Use Topics    Alcohol use:  Yes     Alcohol/week: 0.5 oz     Types: 1 Glasses of wine per week     Comment: nightly     Depression Risk Factor Screening:     3 most recent PHQ Screens 6/15/2018   Little interest or pleasure in doing things Not at all Feeling down, depressed, irritable, or hopeless Not at all   Total Score PHQ 2 0     SCREENINGS  Colonoscopy last done 9/15 Dr Hailey Ferris last done 10/18  DEXA last done 2/16  Gyn last done Dr Jenelle Mujica 5/12    Immunization History   Administered Date(s) Administered    Influenza High Dose Vaccine PF 10/09/2015, 09/29/2017, 08/30/2018    Influenza Vaccine 09/11/2013    Influenza Vaccine Whole 09/20/2011, 10/03/2012    Pneumococcal Conjugate (PCV-13) 07/10/2015    TDAP Vaccine 05/24/2012    ZZZ-RETIRED (DO NOT USE) Pneumococcal Vaccine (Unspecified Type) 01/01/1999, 01/18/2008    Zoster 02/26/2008     Alcohol Risk Factor Screening: On any occasion during the past 3 months, have you had more than 3 drinks containing alcohol? No    Do you average more than 7 drinks per week? No      Functional Ability and Level of Safety:     Hearing Loss   normal-to-mild    Vision - no acute complaints and is followed by Dr Brendan Preston and Dr Qamar Stout of Daily Living   Self-care. Requires assistance with: no ADLs    Fall Risk     Fall Risk Assessment, last 12 mths 6/15/2018   Able to walk? Yes   Fall in past 12 months? No     Abuse Screen   Patient is not abused    Review of Systems   A comprehensive review of systems was negative except for that written in the HPI.     Physical Examination     Evaluation of Cognitive Function:  Mood/affect:  neutral  Appearance: age appropriate, well dressed and within normal Limits  Family member/caregiver input: na    Patient Care Team:  Kevon Sutton MD as PCP - General (Internal Medicine)  Cassie Gleason, RN as Ambulatory Care Navigator (Internal Medicine)  Diego Cevallos RN as Ambulatory Care Navigator (Internal Medicine)    Advice/Referrals/Counseling   Education and counseling provided:  Are appropriate based on today's review and evaluation  End-of-Life planning (with patient's consent)  Pneumococcal Vaccine  Influenza Vaccine  Screening Mammography  Colorectal cancer screening tests  Cardiovascular screening blood test  Bone mass measurement (DEXA)  Diabetes screening test    Assessment/Plan     {No Diagnosis Found}  current treatment plan is effective, no change in therapy  lab results and schedule of future lab studies reviewed with patient. End of life discussion undertaken. Will get copy of living will  Flu high dose when in season  Colonoscopy beyond age?   Mammo to be scheduled 2018  DEXA per Jorge A Arora

## 2019-03-17 NOTE — PROGRESS NOTES
75 y.o. white female who presents for evaluation. She is here asking for follow-up regarding an abnormal DEXA scan that was done. She was seeing NP bright, they did a DEXA in May 2018. She was told that there was some type of hyperdensity in the L1-L2 region and to follow-up with us. He took her until recently to finally call us. She has been asymptomatic, really is doing very well. Specifically no pain, radiculitis symptoms, known history of trauma to the region. Past Medical History:   Diagnosis Date    Arthritis     Dr Stevo Falcon; knees    Diabetes mellitus ([facility] [address]     [facility]; on insulin pump    FHx: breast cancer     Hemorrhoids     Hyperlipidemia     Hypertension     Macular degeneration     and retinopathy Dr Flor Hassan t score -1.2 spine, -2.1 hip (11/10) improved from 2007/2003;  1.7 spine, -2.1 hip (2/16); 2.8 spine, -2.2 hip (5/18) Tisha     Current Outpatient Medications   Medication Sig    coenzyme q10 (CO Q-10) 10 mg cap Take  by mouth.  insulin lispro (HUMALOG) 100 unit/mL injection by Continuous Infusion route. On insulin pump managed by DI    evening primrose oil 500 mg cap Take 500 mg by mouth nightly.  raloxifene (EVISTA) 60 mg tablet Take  by mouth daily.  simvastatin (ZOCOR) 20 mg tablet Take  by mouth nightly.  lisinopril (PRINIVIL, ZESTRIL) 10 mg tablet Take  by mouth daily. No current facility-administered medications for this visit. Allergies   Allergen Reactions    Fosamax [Alendronate] Other (comments)     GI upset     Visit Vitals  /67   Pulse 97   Temp 98 °F (36.7 °C) (Oral)   Resp 14   Ht 5' 2\" (1.575 m)   Wt 155 lb (70.3 kg)   SpO2 98%   BMI 28.35 kg/m²   A&O x3  Affect is appropriate. Mood stable  No apparent distress  Anicteric, no JVD, adenopathy or thyromegaly.    No carotid bruits or radiated murmur  Lungs clear to auscultation, no wheezes or rales  Heart showed regular rate and rhythm. No murmur, rubs, gallops  Abdomen soft nontender, no hepatosplenomegaly or masses. Extremities without edema. Pulses 1-2+ symmetrically    DEXA scan from 5/31/18 showed T score 2.8 L-spine, -2.3 left femoral neck, hyperdensity at the L1-L2 of unknown significance    Assessment and plan:  1. Abnormal imaging. MRI to be done, quick discussion regarding this. She requested something for claustrophobia, xanax given after discussing possible sfx  2. [facility] per [facility]. Above conditions discussed at length and patient vocalized understanding.   All questions answered to patient satisfaction

## 2019-03-21 ENCOUNTER — OFFICE VISIT (OUTPATIENT)
Dept: INTERNAL MEDICINE CLINIC | Age: 74
End: 2019-03-21

## 2019-03-21 ENCOUNTER — TELEPHONE (OUTPATIENT)
Dept: INTERNAL MEDICINE CLINIC | Age: 74
End: 2019-03-21

## 2019-03-21 VITALS
OXYGEN SATURATION: 98 % | RESPIRATION RATE: 14 BRPM | DIASTOLIC BLOOD PRESSURE: 67 MMHG | HEIGHT: 62 IN | TEMPERATURE: 98 F | BODY MASS INDEX: 28.52 KG/M2 | HEART RATE: 97 BPM | SYSTOLIC BLOOD PRESSURE: 128 MMHG | WEIGHT: 155 LBS

## 2019-03-21 DIAGNOSIS — E11.3559 STABLE PROLIFERATIVE DIABETIC RETINOPATHY ASSOCIATED WITH TYPE 2 DIABETES MELLITUS, UNSPECIFIED LATERALITY (HCC): ICD-10-CM

## 2019-03-21 DIAGNOSIS — E08.21 DIABETES MELLITUS DUE TO UNDERLYING CONDITION WITH DIABETIC NEPHROPATHY, WITHOUT LONG-TERM CURRENT USE OF INSULIN (HCC): ICD-10-CM

## 2019-03-21 DIAGNOSIS — F41.9 ANXIETY: ICD-10-CM

## 2019-03-21 DIAGNOSIS — R93.7 ABNORMAL X-RAY OF LUMBAR SPINE: Primary | ICD-10-CM

## 2019-03-21 RX ORDER — ALPRAZOLAM 0.5 MG/1
0.5 TABLET ORAL
Qty: 10 TAB | Refills: 0 | OUTPATIENT
Start: 2019-03-21 | End: 2019-04-15 | Stop reason: SDUPTHER

## 2019-03-21 RX ORDER — AA/PROT/LYSINE/METHIO/VIT C/B6 50-12.5 MG
TABLET ORAL
COMMUNITY
End: 2021-08-19

## 2019-03-21 NOTE — PROGRESS NOTES
Chief Complaint   Patient presents with    Results     pt stated she had a bone density test done and area on her spine was cloudy         1. Have you been to the ER, urgent care clinic or hospitalized since your last visit? NO.     2. Have you seen or consulted any other health care providers outside of the [address] [place] [address] since your last visit (Include any pap smears or colon screening)?  NO

## 2019-03-21 NOTE — TELEPHONE ENCOUNTER
Pt was seen today. Says she isn't sure if RD wants her to keep her pe for next week or later since she needs to have an mri. Please advise. When we call her back we also need to schedule labs. She was on schedule today but had to eat due to blood sugar dipping.

## 2019-03-25 NOTE — TELEPHONE ENCOUNTER
Nurse,    DAVID did give pt Xanax. It is marked as phone in. Please call in and notify pt when done so she can go pick it up.

## 2019-03-27 ENCOUNTER — HOSPITAL ENCOUNTER (OUTPATIENT)
Age: 74
Discharge: HOME | End: 2019-03-27
Attending: INTERNAL MEDICINE
Payer: MEDICARE

## 2019-03-27 DIAGNOSIS — R93.7 ABNORMAL X-RAY OF LUMBAR SPINE: ICD-10-CM

## 2019-03-27 PROCEDURE — 72148 MRI LUMBAR SPINE W/O DYE: CPT

## 2019-04-07 ENCOUNTER — TELEPHONE (OUTPATIENT)
Dept: INTERNAL MEDICINE CLINIC | Age: 74
End: 2019-04-07

## 2019-04-08 ENCOUNTER — LAB ONLY (OUTPATIENT)
Dept: INTERNAL MEDICINE CLINIC | Age: 74
End: 2019-04-08

## 2019-04-08 ENCOUNTER — HOSPITAL ENCOUNTER (OUTPATIENT)
Dept: LAB | Age: 74
Discharge: HOME | End: 2019-04-08
Payer: MEDICARE

## 2019-04-08 DIAGNOSIS — E78.5 HYPERLIPIDEMIA, UNSPECIFIED HYPERLIPIDEMIA TYPE: ICD-10-CM

## 2019-04-08 DIAGNOSIS — E11.40 TYPE 2 DIABETES MELLITUS WITH DIABETIC NEUROPATHY, WITHOUT LONG-TERM CURRENT USE OF INSULIN (HCC): Primary | ICD-10-CM

## 2019-04-08 DIAGNOSIS — E11.40 TYPE 2 DIABETES MELLITUS WITH DIABETIC NEUROPATHY, WITHOUT LONG-TERM CURRENT USE OF INSULIN (HCC): ICD-10-CM

## 2019-04-08 DIAGNOSIS — I10 PRIMARY HYPERTENSION: ICD-10-CM

## 2019-04-08 LAB
ALBUMIN SERPL-MCNC: 3.7 G/DL (ref 3.4–5)
ALBUMIN/GLOB SERPL: 1.2 {RATIO} (ref 0.8–1.7)
ALP SERPL-CCNC: 81 U/L (ref 45–117)
ALT SERPL-CCNC: 20 U/L (ref 13–56)
ANION GAP SERPL CALC-SCNC: 6 MMOL/L (ref 3–18)
AST SERPL-CCNC: 15 U/L (ref 15–37)
BASOPHILS # BLD: 0 K/UL (ref 0–0.1)
BASOPHILS NFR BLD: 0 % (ref 0–2)
BILIRUB SERPL-MCNC: 0.5 MG/DL (ref 0.2–1)
BUN SERPL-MCNC: 17 MG/DL (ref 7–18)
BUN/CREAT SERPL: 21 (ref 12–20)
CALCIUM SERPL-MCNC: 8.5 MG/DL (ref 8.5–10.1)
CHLORIDE SERPL-SCNC: 106 MMOL/L (ref 100–108)
CHOLEST SERPL-MCNC: 154 MG/DL
CO2 SERPL-SCNC: 29 MMOL/L (ref 21–32)
CREAT SERPL-MCNC: 0.8 MG/DL (ref 0.6–1.3)
CREAT UR-MCNC: 167 MG/DL (ref 30–125)
DIFFERENTIAL METHOD BLD: ABNORMAL
EOSINOPHIL # BLD: 0.1 K/UL (ref 0–0.4)
EOSINOPHIL NFR BLD: 2 % (ref 0–5)
ERYTHROCYTE [DISTWIDTH] IN BLOOD BY AUTOMATED COUNT: 14.4 % (ref 11.6–14.5)
GLOBULIN SER CALC-MCNC: 3.1 G/DL (ref 2–4)
GLUCOSE SERPL-MCNC: 90 MG/DL (ref 74–99)
HCT VFR BLD AUTO: 42.4 % (ref 35–45)
HDLC SERPL-MCNC: 86 MG/DL (ref 40–60)
HDLC SERPL: 1.8 {RATIO} (ref 0–5)
HGB BLD-MCNC: 14.1 G/DL (ref 12–16)
LDLC SERPL CALC-MCNC: 55.8 MG/DL (ref 0–100)
LIPID PROFILE,FLP: ABNORMAL
LYMPHOCYTES # BLD: 2.2 K/UL (ref 0.9–3.6)
LYMPHOCYTES NFR BLD: 36 % (ref 21–52)
MCH RBC QN AUTO: 32.6 PG (ref 24–34)
MCHC RBC AUTO-ENTMCNC: 33.3 G/DL (ref 31–37)
MCV RBC AUTO: 98.1 FL (ref 74–97)
MICROALBUMIN UR-MCNC: 2.6 MG/DL (ref 0–3)
MICROALBUMIN/CREAT UR-RTO: 16 MG/G (ref 0–30)
MONOCYTES # BLD: 0.4 K/UL (ref 0.05–1.2)
MONOCYTES NFR BLD: 7 % (ref 3–10)
NEUTS SEG # BLD: 3.3 K/UL (ref 1.8–8)
NEUTS SEG NFR BLD: 55 % (ref 40–73)
PLATELET # BLD AUTO: 287 K/UL (ref 135–420)
PMV BLD AUTO: 10.6 FL (ref 9.2–11.8)
POTASSIUM SERPL-SCNC: 4.2 MMOL/L (ref 3.5–5.5)
PROT SERPL-MCNC: 6.8 G/DL (ref 6.4–8.2)
RBC # BLD AUTO: 4.32 M/UL (ref 4.2–5.3)
SODIUM SERPL-SCNC: 141 MMOL/L (ref 136–145)
TRIGL SERPL-MCNC: 61 MG/DL (ref ?–150)
VLDLC SERPL CALC-MCNC: 12.2 MG/DL
WBC # BLD AUTO: 6 K/UL (ref 4.6–13.2)

## 2019-04-08 PROCEDURE — 80053 COMPREHEN METABOLIC PANEL: CPT

## 2019-04-08 PROCEDURE — 82043 UR ALBUMIN QUANTITATIVE: CPT

## 2019-04-08 PROCEDURE — 83036 HEMOGLOBIN GLYCOSYLATED A1C: CPT

## 2019-04-08 PROCEDURE — 85025 COMPLETE CBC W/AUTO DIFF WBC: CPT

## 2019-04-08 PROCEDURE — 80061 LIPID PANEL: CPT

## 2019-04-08 PROCEDURE — 36415 COLL VENOUS BLD VENIPUNCTURE: CPT

## 2019-04-09 LAB
EST. AVERAGE GLUCOSE BLD GHB EST-MCNC: 183 MG/DL
HBA1C MFR BLD: 8 % (ref 4.2–5.6)

## 2019-04-12 NOTE — PROGRESS NOTES
This is a Subsequent Medicare Annual Wellness Visit      I have reviewed the patient's medical history in detail and updated the computerized patient record. 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    on mri    Diabetes mellitus ([address]     [facility]; on insulin pump    FHx: breast cancer     Hemorrhoids     Hyperlipidemia     Hypertension     Lumbar spinal stenosis 03/2019    mri     Macular degeneration     and retinopathy Dr Lima Him t score -1.2 spine, -2.1 hip (11/10) improved from 2007/2003;  1.7 spine, -2.1 hip (2/16); 2.8 spine, -2.2 hip (5/18) [facility]      Past Surgical History:   Procedure Laterality Date    HX CATARACT REMOVAL      Dr Evelio Grimes 2015   Felicia Bravo 2005 negative; Dr Jess Trejo 9/15 negative    HX ORTHOPAEDIC      DEXA 1.7 spine, -2.1 hip 2/16 [facility]    US ABD COMP  8/02    negative     Current Outpatient Medications   Medication Sig Dispense Refill    ALPRAZolam (XANAX) 0.5 mg tablet Take 1 Tab by mouth three (3) times daily as needed for Anxiety. Max Daily Amount: 1.5 mg. 20 Tab 1    coenzyme q10 (CO Q-10) 10 mg cap Take  by mouth.  insulin lispro (HUMALOG) 100 unit/mL injection by Continuous Infusion route. On insulin pump managed by DI      evening primrose oil 500 mg cap Take 500 mg by mouth nightly.  raloxifene (EVISTA) 60 mg tablet Take  by mouth daily.  simvastatin (ZOCOR) 20 mg tablet Take  by mouth nightly.  lisinopril (PRINIVIL, ZESTRIL) 10 mg tablet Take  by mouth daily.          Allergies   Allergen Reactions    Fosamax [Alendronate] Other (comments)     GI upset     Family History   Problem Relation Age of Onset    Cancer Mother         breast    Breast Cancer Mother      Social History     Tobacco Use    Smoking status: Former Smoker     Last attempt to quit: 1995     Years since quittin.6    Smokeless tobacco: Never Used   Substance Use Topics    Alcohol use: Yes     Alcohol/week: 0.5 oz     Types: 1 Glasses of wine per week     Comment: nightly     Depression Risk Factor Screening:     3 most recent PHQ Screens 4/15/2019   Little interest or pleasure in doing things Not at all   Feeling down, depressed, irritable, or hopeless Not at all   Total Score PHQ 2 0     SCREENINGS  Colonoscopy last done 9/15 Dr Varma Hidden last done 10/18  DEXA last done   Gyn last done Dr Rigoberto Art     Immunization History   Administered Date(s) Administered    (RETIRED) Pneumococcal Vaccine (Unspecified Type) 1999, 2008    Influenza High Dose Vaccine PF 10/09/2015, 2017, 2018    Influenza Vaccine 2013    Influenza Vaccine (Tri) Adjuvanted 2018    Influenza Vaccine Whole 2011, 10/03/2012    Pneumococcal Conjugate (PCV-13) 07/10/2015    TDAP Vaccine 2012    Zoster 2008     Alcohol Risk Factor Screening: On any occasion during the past 3 months, have you had more than 3 drinks containing alcohol? No    Do you average more than 7 drinks per week? No      Functional Ability and Level of Safety:     Hearing Loss   normal-to-mild    Vision - no acute complaints and is followed by Dr Evelio Grimes and Dr Munoz Crew of Daily Living   Self-care. Requires assistance with: no ADLs    Fall Risk     Fall Risk Assessment, last 12 mths 4/15/2019   Able to walk? Yes   Fall in past 12 months? No     Abuse Screen   Patient is not abused    Review of Systems   A comprehensive review of systems was negative except for that written in the HPI.     Physical Examination     Evaluation of Cognitive Function:  Mood/affect:  neutral  Appearance: age appropriate, well dressed and within normal Limits  Family member/caregiver input: na    Patient Care Team:  Marilyn Melton MD as PCP - General (Internal Medicine)  Godwin Chandler Ramila Lundberg, RN as Ambulatory Care Navigator (Internal Medicine)  Joan Ray, RN as Ambulatory Care Navigator (Internal Medicine)    Advice/Referrals/Counseling   Education and counseling provided:  Are appropriate based on today's review and evaluation  End-of-Life planning (with patient's consent)  Pneumococcal Vaccine  Influenza Vaccine  Screening Mammography  Colorectal cancer screening tests  Cardiovascular screening blood test  Bone mass measurement (DEXA)  Diabetes screening test    Assessment/Plan       ICD-10-CM ICD-9-CM    1. Medicare annual wellness visit, subsequent Z00.00 V70.0    2. Anxiety F41.9 300.00 ALPRAZolam (XANAX) 0.5 mg tablet   3. Primary hypertension I10 401.9 AMB POC EKG ROUTINE W/ 12 LEADS, INTER & REP   4. Advanced directives, counseling/discussion Z71.89 V65.49 ADVANCE CARE PLANNING FIRST 30 MINS   5. Screening for alcoholism Z13.39 V79.1 VT ANNUAL ALCOHOL SCREEN 15 MIN   6. Screening for depression Z13.31 V79.0 DEPRESSION SCREEN ANNUAL   7. Screen for colon cancer Z12.11 V76.51    8. Screening for ischemic heart disease Z13.6 V81.0    9. Osteopenia, unspecified location M85.80 733.90    10. Hyperlipidemia, unspecified hyperlipidemia type E78.5 272.4    11. Diabetes mellitus due to underlying condition with diabetic nephropathy, without long-term current use of insulin (HCC) E08.21 249.40      583.81    12. Stable proliferative diabetic retinopathy associated with type 2 diabetes mellitus, unspecified laterality (HCC) E11.3559 250.50      362.02      current treatment plan is effective, no change in therapy  lab results and schedule of future lab studies reviewed with patient. End of life discussion undertaken. Will get copy of living will  Flu high dose when in season  Colonoscopy beyond age when due?   Mammo to be scheduled 2018  DEXA per Jorge A Arora

## 2019-04-12 NOTE — PROGRESS NOTES
75 y.o. white female who presents for evaluation. She continues to see NP Adal at the [facility]. No polyuria, polydipsia, nocturia. No neuropathy symptoms. Sees Dr Keaton Manzanares also    Denies any cardiovascular complaints. She's trying to walk most days but no other set exercise    Denies GI or  complaints.      She wants refill of the xanax for stress    LAST MEDICARE WELLNESS EXAM: 7/10/14, 7/10/15, 3/16/17, 3/22/18, 4/15/19    Past Medical History:   Diagnosis Date    Arthritis     Dr Renetta Otto; knees    Degenerative joint disease (DJD) of lumbar spine 2019    on mri    Diabetes mellitus ([facility] [address]     [facility]; on insulin pump    FHx: breast cancer     Hemorrhoids     Hyperlipidemia     Hypertension     Lumbar spinal stenosis 2019    mri     Macular degeneration     and retinopathy Dr Elina Sanchez t score -1.2 spine, -2.1 hip (11/10) improved from ;  1.7 spine, -2.1 hip (); 2.8 spine, -2.2 hip () Strelitz     Past Surgical History:   Procedure Laterality Date    HX CATARACT REMOVAL      Dr Catalina Arnold    Denece Stager      Dr. Jeanette Gomez  negative; Dr Quevedo Cap 9/15 negative    HX ORTHOPAEDIC      DEXA 1.7 spine, -2.1 hip  Strelitz    US ABD COMP      negative     Social History     Socioeconomic History    Marital status:      Spouse name: Not on file    Number of children: 2    Years of education: Not on file    Highest education level: Not on file   Occupational History    Occupation: ret  now part time [employer]   Social Needs    Financial resource strain: Not on file    Food insecurity:     Worry: Not on file     Inability: Not on file    Transportation needs:     Medical: Not on file     Non-medical: Not on file   Tobacco Use    Smoking status: Former Smoker     Last attempt to quit: 1995     Years since quittin.6    Smokeless tobacco: Never Used   Substance and Sexual Activity    Alcohol use: Yes     Alcohol/week: 0.5 oz     Types: 1 Glasses of wine per week     Comment: nightly    Drug use: No    Sexual activity: Not on file   Lifestyle    Physical activity:     Days per week: Not on file     Minutes per session: Not on file    Stress: Not on file   Relationships    Social connections:     Talks on phone: Not on file     Gets together: Not on file     Attends Hoahaoism service: Not on file     Active member of club or organization: Not on file     Attends meetings of clubs or organizations: Not on file     Relationship status: Not on file    Intimate partner violence:     Fear of current or ex partner: Not on file     Emotionally abused: Not on file     Physically abused: Not on file     Forced sexual activity: Not on file   Other Topics Concern    Not on file   Social History Narrative    Not on file     Current Outpatient Medications   Medication Sig    ALPRAZolam (XANAX) 0.5 mg tablet Take 1 Tab by mouth three (3) times daily as needed for Anxiety. Max Daily Amount: 1.5 mg.    coenzyme q10 (CO Q-10) 10 mg cap Take  by mouth.  insulin lispro (HUMALOG) 100 unit/mL injection by Continuous Infusion route. On insulin pump managed by DI    evening primrose oil 500 mg cap Take 500 mg by mouth nightly.  raloxifene (EVISTA) 60 mg tablet Take  by mouth daily.  simvastatin (ZOCOR) 20 mg tablet Take  by mouth nightly.  lisinopril (PRINIVIL, ZESTRIL) 10 mg tablet Take  by mouth daily. No current facility-administered medications for this visit.       Allergies   Allergen Reactions    Fosamax [Alendronate] Other (comments)     GI upset     REVIEW OF SYSTEMS:  gyn Dr Ana Ballard 2012, mammo 10/18, sees Dr Johnie Kim, last DEXA 2/16, colo 9/15 Dr Allison Kent  Ophtho - no vision change or eye pain  Oral - no mouth pain, tongue or tooth problems  Ears - no hearing loss, ear pain, fullness, no swallowing problems  Cardiac - no CP, PND, orthopnea, edema, palpitations or syncope  Chest - no breast masses  Resp - no wheezing, chronic coughing, dyspnea  GI - no heartburn, nausea, vomiting, change in bowel habits, bleeding, hemorrhoids  Urinary - no dysuria, hematuria, flank pain, urgency, frequency  Ortho - no swelling, dec ROM, myalgias    Visit Vitals  /72   Pulse 91   Temp 98.4 °F (36.9 °C) (Oral)   Resp 14   Ht 5' 2\" (1.575 m)   Wt 152 lb (68.9 kg)   SpO2 96%   BMI 27.80 kg/m²     Affect is appropriate. Mood stable  No apparent distress  HEENT --Anicteric sclerae, tympanic membranes normal,  ear canals normal.  No thyromegaly, JVD, or bruits. PERRL, EOMI, conjunctiva and lids normal.    Lungs --Clear to auscultation and percussion, normal percussion. Heart --Regular rate and rhythm, no murmurs, rubs, gallops, or clicks. Chest wall --Nontender to palpation. PMI normal.  Abdomen -- Soft and nontender, no hepatosplenomegaly or masses. Extremities -- Without cyanosis, clubbing, edema. 2+ pulses equally and bilaterally. LABS   From 5/12 showed   gluc 155, cr 0.69, gfr 90,  alt 12,          chol 149, tg 75, hdl 76, ldl-c 58,      hb 13.2, plt 290, sadia neg  From 11/13 showed gluc 134, cr 0.69, gfr>60,    hba1c 7.3,      wbc 7.9, hb 11.1, plt 235, ck/trop neg  From 1/14 showed                     umar 9.0  From 4/14 showed        hba1c 7.0, chol 162, tg 73, hdl 98, ldl-c 50  From 7/15 showed   gluc 101, cr 0.76, gfr>60, alt<5,  hba1c 8.0, chol 152, tg 49, hdl 81, ldl-c 61,  wbc 5.3, hb 13.6, plt 245, umar 4.2  From 3/17 showed   gluc 90,   cr 0.80, gfr>60, alt 20, hba1c 8.2, chol 156, tg 58, hdl 97, ldl-c 47,  wbc 6.1, hb 13.5, plt 279, umar 7.0  From 3/18 showed   gluc 84,   cr 0.68, gfr>60, alt 20, hba1c 7.9, chol 135, tg 52, hdl 90, ldl-c 35,  wbc 4.5, hb 14.7, plt 292, umar 11  From 4/19 showed   gluc 90,   cr 0.80, gfr>60, alt 20, hba1c 8.0, chol 154, tg 61, hdl 86, ldl-c 56,  wbc 6.0, hb 14.1, plt 287, umar 16    EKG as read by me showed nsr 80, normal intervals.  borderline r wave progression    Patient Active Problem List   Diagnosis Code    Diabetes mellitus on insulin pump Dr. Royal Hanson E11.9    Osteopenia M85.80    Hyperlipidemia E78.5    Primary hypertension I10    Proliferative diabetic retinopathy associated with type 2 diabetes mellitus ([facility] [address] E11.3599     Assessment and plan:  1. Diabetes. Doing relatively well, f/u NP at [facility], f/u Dr. Charu Magaña  2. Hypertension. Continue current  3. Hyperlipidemia. Continue Zocor   4. Osteopenia. Continue Evista, calcium plus D., weight bearing exercise. DEXA being followed at [facility]  5. Stress. Xanax refill        RTC 4/20    Above conditions discussed at length and patient vocalized understanding.   All questions answered to patient satisfaction

## 2019-04-15 ENCOUNTER — OFFICE VISIT (OUTPATIENT)
Dept: INTERNAL MEDICINE CLINIC | Age: 74
End: 2019-04-15

## 2019-04-15 VITALS
BODY MASS INDEX: 27.97 KG/M2 | HEIGHT: 62 IN | OXYGEN SATURATION: 96 % | TEMPERATURE: 98.4 F | DIASTOLIC BLOOD PRESSURE: 72 MMHG | RESPIRATION RATE: 14 BRPM | HEART RATE: 91 BPM | WEIGHT: 152 LBS | SYSTOLIC BLOOD PRESSURE: 124 MMHG

## 2019-04-15 DIAGNOSIS — E11.3559 STABLE PROLIFERATIVE DIABETIC RETINOPATHY ASSOCIATED WITH TYPE 2 DIABETES MELLITUS, UNSPECIFIED LATERALITY (HCC): ICD-10-CM

## 2019-04-15 DIAGNOSIS — M85.80 OSTEOPENIA, UNSPECIFIED LOCATION: ICD-10-CM

## 2019-04-15 DIAGNOSIS — Z13.6 SCREENING FOR ISCHEMIC HEART DISEASE: ICD-10-CM

## 2019-04-15 DIAGNOSIS — I10 PRIMARY HYPERTENSION: ICD-10-CM

## 2019-04-15 DIAGNOSIS — F41.9 ANXIETY: ICD-10-CM

## 2019-04-15 DIAGNOSIS — Z13.31 SCREENING FOR DEPRESSION: ICD-10-CM

## 2019-04-15 DIAGNOSIS — E08.21 DIABETES MELLITUS DUE TO UNDERLYING CONDITION WITH DIABETIC NEPHROPATHY, WITHOUT LONG-TERM CURRENT USE OF INSULIN (HCC): ICD-10-CM

## 2019-04-15 DIAGNOSIS — E78.5 HYPERLIPIDEMIA, UNSPECIFIED HYPERLIPIDEMIA TYPE: ICD-10-CM

## 2019-04-15 DIAGNOSIS — Z12.11 SCREEN FOR COLON CANCER: ICD-10-CM

## 2019-04-15 DIAGNOSIS — Z71.89 ADVANCED DIRECTIVES, COUNSELING/DISCUSSION: ICD-10-CM

## 2019-04-15 DIAGNOSIS — Z13.39 SCREENING FOR ALCOHOLISM: ICD-10-CM

## 2019-04-15 DIAGNOSIS — Z00.00 MEDICARE ANNUAL WELLNESS VISIT, SUBSEQUENT: Primary | ICD-10-CM

## 2019-04-15 RX ORDER — ALPRAZOLAM 0.5 MG/1
0.5 TABLET ORAL
Qty: 20 TAB | Refills: 1 | Status: SHIPPED | OUTPATIENT
Start: 2019-04-15 | End: 2021-08-19

## 2019-04-15 RX ORDER — ALPRAZOLAM 0.5 MG/1
0.5 TABLET ORAL
Qty: 20 TAB | Refills: 1 | OUTPATIENT
Start: 2019-04-15 | End: 2019-04-15 | Stop reason: SDUPTHER

## 2019-04-15 NOTE — PATIENT INSTRUCTIONS
Medicare Wellness Visit, Female     The best way to live healthy is to have a lifestyle where you eat a well-balanced diet, exercise regularly, limit alcohol use, and quit all forms of tobacco/nicotine, if applicable. Regular preventive services are another way to keep healthy. Preventive services (vaccines, screening tests, monitoring & exams) can help personalize your care plan, which helps you manage your own care. Screening tests can find health problems at the earliest stages, when they are easiest to treat. [facility] follows the current, evidence-based guidelines published by the [facility] ([facility]) when recommending preventive services for our patients. Because we follow these guidelines, sometimes recommendations change over time as research supports it. (For example, mammograms used to be recommended annually. Even though Medicare will still pay for an annual mammogram, the newer guidelines recommend a mammogram every two years for women of average risk.)  Of course, you and your doctor may decide to screen more often for some diseases, based on your risk and your health status. Preventive services for you include:  - Medicare offers their members a free annual wellness visit, which is time for you and your primary care provider to discuss and plan for your preventive service needs. Take advantage of this benefit every year!  -All adults over the age of 72 should receive the recommended pneumonia vaccines. Current USPSTF guidelines recommend a series of two vaccines for the best pneumonia protection.   -All adults should have a flu vaccine yearly and a tetanus vaccine every 10 years. All adults age 61 and older should receive a shingles vaccine once in their lifetime.    -A bone mass density test is recommended when a woman turns 65 to screen for osteoporosis. This test is only recommended one time, as a screening.  Some providers will use this same test as a disease monitoring tool if you already have osteoporosis. -All adults age 38-68 who are overweight should have a diabetes screening test once every three years.   -Other screening tests and preventive services for persons with diabetes include: an eye exam to screen for diabetic retinopathy, a kidney function test, a foot exam, and stricter control over your cholesterol.   -Cardiovascular screening for adults with routine risk involves an electrocardiogram (ECG) at intervals determined by your doctor.   -Colorectal cancer screenings should be done for adults age 54-65 with no increased risk factors for colorectal cancer. There are a number of acceptable methods of screening for this type of cancer. Each test has its own benefits and drawbacks. Discuss with your doctor what is most appropriate for you during your annual wellness visit. The different tests include: colonoscopy (considered the best screening method), a fecal occult blood test, a fecal DNA test, and sigmoidoscopy. -Breast cancer screenings are recommended every other year for women of normal risk, age 54-69.  -Cervical cancer screenings for women over age 72 are only recommended with certain risk factors.   -All adults born between [facility] should be screened once for Hepatitis C.      Here is a list of your current Health Maintenance items (your personalized list of preventive services) with a due date:  Health Maintenance Due   Topic Date Due    Shingles Vaccine (1 of 2) 07/16/1994    Diabetic Foot Care  03/22/2019    Annual Well Visit  03/23/2019

## 2019-04-15 NOTE — ACP (ADVANCE CARE PLANNING)
Advance Care Planning    Advance Care Planning (ACP) Provider Note - Comprehensive     Date of ACP Conversation: 04/15/19  Persons included in Conversation:  patient  Length of ACP Conversation in minutes:  16 minutes    Authorized Decision Maker (if patient is incapable of making informed decisions): This person is:   Other Legally Authorized Decision Maker (e.g. Next of Kin)          General ACP for ALL Patients with Decision Making Capacity:   Importance of advance care planning, including choosing a healthcare agent to communicate patient's healthcare decisions if patient lost the ability to make decisions, such as after a sudden illness or accident  Understanding of the healthcare agent role was assessed and information provided  Exploration of values, goals, and preferences if recovery is not expected, even with continued medical treatment in the event of: Imminent death  Severe, permanent brain injury    Review of Existing Advance Directive:  na    For Serious or Chronic Illness:  Understanding of medical condition    Understanding of CPR, goals and expected outcomes, benefits and burdens discussed.     Interventions Provided:  Recommended completion of Advance Directive form after review of ACP materials and conversation with prospective healthcare agent   Recommended communicating the plan and making copies for the healthcare agent, personal physician, and others as appropriate (e.g., health system)  Recommended review of completed ACP document annually or upon change in health status

## 2019-04-15 NOTE — PROGRESS NOTES
Chief Complaint   Patient presents with    Annual Wellness Visit    Diabetes     with labs    Other     discuss MRI results         1. Have you been to the ER, urgent care clinic or hospitalized since your last visit? NO.     2. Have you seen or consulted any other health care providers outside of the [address] [place] [address] since your last visit (Include any pap smears or colon screening)?  NO

## 2019-06-20 ENCOUNTER — OFFICE VISIT (OUTPATIENT)
Dept: INTERNAL MEDICINE CLINIC | Age: 74
End: 2019-06-20

## 2019-06-20 ENCOUNTER — HOSPITAL ENCOUNTER (OUTPATIENT)
Dept: LAB | Age: 74
Discharge: HOME | End: 2019-06-20
Payer: MEDICARE

## 2019-06-20 VITALS
OXYGEN SATURATION: 97 % | RESPIRATION RATE: 14 BRPM | HEART RATE: 86 BPM | SYSTOLIC BLOOD PRESSURE: 128 MMHG | WEIGHT: 152 LBS | BODY MASS INDEX: 27.97 KG/M2 | DIASTOLIC BLOOD PRESSURE: 68 MMHG | HEIGHT: 62 IN | TEMPERATURE: 97.2 F

## 2019-06-20 DIAGNOSIS — R19.7 DIARRHEA, UNSPECIFIED TYPE: ICD-10-CM

## 2019-06-20 DIAGNOSIS — R19.7 DIARRHEA, UNSPECIFIED TYPE: Primary | ICD-10-CM

## 2019-06-20 LAB
ALBUMIN SERPL-MCNC: 3.7 G/DL (ref 3.4–5)
ALBUMIN/GLOB SERPL: 1.2 {RATIO} (ref 0.8–1.7)
ALP SERPL-CCNC: 77 U/L (ref 45–117)
ALT SERPL-CCNC: 24 U/L (ref 13–56)
ANION GAP SERPL CALC-SCNC: 5 MMOL/L (ref 3–18)
AST SERPL-CCNC: 16 U/L (ref 15–37)
BASOPHILS # BLD: 0 K/UL (ref 0–0.1)
BASOPHILS NFR BLD: 0 % (ref 0–2)
BILIRUB SERPL-MCNC: 0.4 MG/DL (ref 0.2–1)
BUN SERPL-MCNC: 15 MG/DL (ref 7–18)
BUN/CREAT SERPL: 20 (ref 12–20)
C DIFF GDH STL QL: NEGATIVE
C DIFF TOX A+B STL QL IA: NEGATIVE
CALCIUM SERPL-MCNC: 8.7 MG/DL (ref 8.5–10.1)
CHLORIDE SERPL-SCNC: 108 MMOL/L (ref 100–108)
CO2 SERPL-SCNC: 26 MMOL/L (ref 21–32)
CREAT SERPL-MCNC: 0.74 MG/DL (ref 0.6–1.3)
DIFFERENTIAL METHOD BLD: ABNORMAL
EOSINOPHIL # BLD: 0.1 K/UL (ref 0–0.4)
EOSINOPHIL NFR BLD: 1 % (ref 0–5)
ERYTHROCYTE [DISTWIDTH] IN BLOOD BY AUTOMATED COUNT: 13.4 % (ref 11.6–14.5)
GLOBULIN SER CALC-MCNC: 3.2 G/DL (ref 2–4)
GLUCOSE SERPL-MCNC: 196 MG/DL (ref 74–99)
HCT VFR BLD AUTO: 41 % (ref 35–45)
HGB BLD-MCNC: 13.8 G/DL (ref 12–16)
INTERPRETATION: NORMAL
LYMPHOCYTES # BLD: 1.3 K/UL (ref 0.9–3.6)
LYMPHOCYTES NFR BLD: 19 % (ref 21–52)
MCH RBC QN AUTO: 32.9 PG (ref 24–34)
MCHC RBC AUTO-ENTMCNC: 33.7 G/DL (ref 31–37)
MCV RBC AUTO: 97.9 FL (ref 74–97)
MONOCYTES # BLD: 0.5 K/UL (ref 0.05–1.2)
MONOCYTES NFR BLD: 8 % (ref 3–10)
NEUTS SEG # BLD: 4.9 K/UL (ref 1.8–8)
NEUTS SEG NFR BLD: 72 % (ref 40–73)
PLATELET # BLD AUTO: 268 K/UL (ref 135–420)
PMV BLD AUTO: 10.1 FL (ref 9.2–11.8)
POTASSIUM SERPL-SCNC: 4.1 MMOL/L (ref 3.5–5.5)
PROT SERPL-MCNC: 6.9 G/DL (ref 6.4–8.2)
RBC # BLD AUTO: 4.19 M/UL (ref 4.2–5.3)
SODIUM SERPL-SCNC: 139 MMOL/L (ref 136–145)
WBC # BLD AUTO: 6.8 K/UL (ref 4.6–13.2)

## 2019-06-20 PROCEDURE — 85025 COMPLETE CBC W/AUTO DIFF WBC: CPT

## 2019-06-20 PROCEDURE — 87449 NOS EACH ORGANISM AG IA: CPT

## 2019-06-20 PROCEDURE — 87177 OVA AND PARASITES SMEARS: CPT

## 2019-06-20 PROCEDURE — 87045 FECES CULTURE AEROBIC BACT: CPT

## 2019-06-20 PROCEDURE — 80053 COMPREHEN METABOLIC PANEL: CPT

## 2019-06-20 PROCEDURE — 36415 COLL VENOUS BLD VENIPUNCTURE: CPT

## 2019-06-20 NOTE — PROGRESS NOTES
Crissy Brooksuphin presents today for   Chief Complaint   Patient presents with    Diarrhea     intermittent episodes of diarrhea about every other day x 1.5 weeks               Depression Screening:  3 most recent PHQ Screens 4/15/2019   Little interest or pleasure in doing things Not at all   Feeling down, depressed, irritable, or hopeless Not at all   Total Score PHQ 2 0       Learning Assessment:  Learning Assessment 3/21/2019   PRIMARY LEARNER Patient   HIGHEST LEVEL OF EDUCATION - PRIMARY LEARNER  SOME COLLEGE   BARRIERS PRIMARY LEARNER NONE   CO-LEARNER CAREGIVER No   PRIMARY LANGUAGE ENGLISH   LEARNER PREFERENCE PRIMARY DEMONSTRATION     LISTENING     PICTURES     READING     VIDEOS   ANSWERED BY PATIENT   RELATIONSHIP SELF       Abuse Screening:  Abuse Screening Questionnaire 4/15/2019   Do you ever feel afraid of your partner? N   Are you in a relationship with someone who physically or mentally threatens you? N   Is it safe for you to go home? Y       Fall Risk  Fall Risk Assessment, last 12 mths 4/15/2019   Able to walk? Yes   Fall in past 12 months? No           Coordination of Care:  1. Have you been to the ER, urgent care clinic since your last visit? Hospitalized since your last visit? no    2. Have you seen or consulted any other health care providers outside of the [address] [place] [address] since your last visit? Include any pap smears or colon screening.  no

## 2019-06-20 NOTE — PROGRESS NOTES
Jeronimo Davis is a 75 y.o.  female and presents with    Chief Complaint   Patient presents with    Diarrhea     intermittent episodes of diarrhea about every other day x 1.5 weeks, has used immodium       Subjective:  HPI  Mrs. Vanessa Nuñez reports 1.5 weeks of diarrhea. She reports put  sour cream on a baked potato and woke up the next morning with diarrhea. She reports using the restroom about 3-4 times a day with watery stool that is urgent. She reports incontinence yesterday. Sometimes in the morning it will start off firm and then becomes more loose. She reports when uses the bathroom she feels relieved. Denies fever, chills, n/v, abdominal pain, dysuria. Reports with some bloating. Denies sick contacts,     She is using the Imodium,started about 3 days after symptoms began. She has only used the Imodium 3 times since symptoms began. She reports DM controlled, she has a gallbladder. Denies antibiotics or hospital stay in the last 6 months. Additional Concerns: none     ROS   Review of Systems   Gastrointestinal: Positive for diarrhea. All other systems reviewed and are negative. Allergies   Allergen Reactions    Fosamax [Alendronate] Other (comments)     GI upset       Current Outpatient Medications   Medication Sig Dispense Refill    coenzyme q10 (CO Q-10) 10 mg cap Take  by mouth.  insulin lispro (HUMALOG) 100 unit/mL injection by Continuous Infusion route. On insulin pump managed by DI      evening primrose oil 500 mg cap Take 500 mg by mouth nightly.  raloxifene (EVISTA) 60 mg tablet Take  by mouth daily.  simvastatin (ZOCOR) 20 mg tablet Take  by mouth nightly.  lisinopril (PRINIVIL, ZESTRIL) 10 mg tablet Take  by mouth daily.  ALPRAZolam (XANAX) 0.5 mg tablet Take 1 Tab by mouth three (3) times daily as needed for Anxiety.  Max Daily Amount: 1.5 mg. 20 Tab 1       Social History     Socioeconomic History    Marital status:  Spouse name: Not on file    Number of children: 2    Years of education: Not on file    Highest education level: Not on file   Occupational History    Occupation: ret  now part time [employer]   Social Needs    Financial resource strain: Not on file    Food insecurity:     Worry: Not on file     Inability: Not on file    Transportation needs:     Medical: Not on file     Non-medical: Not on file   Tobacco Use    Smoking status: Former Smoker     Last attempt to quit: 1995     Years since quittin.8    Smokeless tobacco: Never Used   Substance and Sexual Activity    Alcohol use:  Yes     Alcohol/week: 0.5 oz     Types: 1 Glasses of wine per week     Comment: nightly    Drug use: No    Sexual activity: Not on file   Lifestyle    Physical activity:     Days per week: Not on file     Minutes per session: Not on file    Stress: Not on file   Relationships    Social connections:     Talks on phone: Not on file     Gets together: Not on file     Attends Islam service: Not on file     Active member of club or organization: Not on file     Attends meetings of clubs or organizations: Not on file     Relationship status: Not on file    Intimate partner violence:     Fear of current or ex partner: Not on file     Emotionally abused: Not on file     Physically abused: Not on file     Forced sexual activity: Not on file   Other Topics Concern    Not on file   Social History Narrative    Not on file       Past Medical History:   Diagnosis Date    Arthritis     Dr Vasile Street; knees    Degenerative joint disease (DJD) of lumbar spine 2019    on mri    Diabetes mellitus ([facility] [address]     [facility]; on insulin pump    FHx: breast cancer     Hemorrhoids     Hyperlipidemia     Hypertension     Lumbar spinal stenosis 2019    mri     Macular degeneration     and retinopathy Dr Kelley Worrell t score -1.2 spine, -2.1 hip (11/10) improved from 2007/2003;  1.7 spine, -2.1 hip (2/16); 2.8 spine, -2.2 hip (5/18) Strelitz       Past Surgical History:   Procedure Laterality Date   Ricardo Reus Dr Trish Schirmer 2015   Earnesteen Geoffrey Fernandez 2005 negative; Dr Ricki Ashley 9/15 negative    HX ORTHOPAEDIC      DEXA 1.7 spine, -2.1 hip 2/16 Strelitz    US ABD COMP  8/02    negative       Family History   Problem Relation Age of Onset    Cancer Mother         breast    Breast Cancer Mother        Objective:  Vitals:    06/20/19 1113   BP: 128/68   Pulse: 86   Resp: 14   Temp: 97.2 °F (36.2 °C)   TempSrc: Oral   SpO2: 97%   Weight: 152 lb (68.9 kg)   Height: 5' 2\" (1.575 m)   PainSc:   0 - No pain       LABS   Results for orders placed or performed in visit on 04/10/19   AMB EXT CREATININE   Result Value Ref Range    Creatinine, External 0.6        TESTS  none    PE  Physical Exam   Constitutional: She is oriented to person, place, and time. She appears well-developed and well-nourished. No distress. HENT:   Head: Normocephalic and atraumatic. Cardiovascular: Normal rate, regular rhythm, normal heart sounds and intact distal pulses. Pulmonary/Chest: Effort normal and breath sounds normal.   Abdominal: Soft. Bowel sounds are normal. She exhibits no distension. There is no tenderness. There is no rebound. Musculoskeletal: Normal range of motion. Neurological: She is alert and oriented to person, place, and time. Skin: Skin is warm and dry. She is not diaphoretic. Psychiatric: She has a normal mood and affect. Her behavior is normal. Judgment and thought content normal.             Assessment/Plan:    1. Diarrhea- labs and stool samples ordered. Report if worsening. Lab review: orders written for new lab studies as appropriate; see orders    Today's Visit:   Diagnoses and all orders for this visit:    1. Diarrhea, unspecified type  -     CBC WITH AUTOMATED DIFF; Future  -     METABOLIC PANEL, COMPREHENSIVE;  Future  -     CULTURE, STOOL; Future  -     OVA & PARASITES, STOOL; Future  -     C. DIFFICILE/EPI PCR; Future            Health Maintenance: Deferred to PCP. I have discussed the diagnosis with the patient and the intended plan as seen in the above orders. The patient has received an after-visit summary and questions were answered concerning future plans. I have discussed medication side effects and warnings with the patient as well. I have reviewed the plan of care with the patient, accepted their input and they are in agreement with the treatment goals. More than 1/2 of this 15 minute visit was spent in counseling and coordination of care, as described above.     TYRA Aldana  Internist of [address], [facility].  Phone: [phone]  Fax: [phone]

## 2019-06-22 LAB
BACTERIA SPEC CULT: NORMAL
SERVICE CMNT-IMP: NORMAL

## 2019-06-24 ENCOUNTER — TELEPHONE (OUTPATIENT)
Dept: INTERNAL MEDICINE CLINIC | Age: 74
End: 2019-06-24

## 2019-06-24 LAB
O+P STL CONC: NORMAL
O+P STL MICRO: NORMAL

## 2019-08-02 ENCOUNTER — OFFICE VISIT (OUTPATIENT)
Dept: INTERNAL MEDICINE CLINIC | Age: 74
End: 2019-08-02

## 2019-08-02 VITALS
SYSTOLIC BLOOD PRESSURE: 128 MMHG | HEART RATE: 89 BPM | RESPIRATION RATE: 14 BRPM | OXYGEN SATURATION: 97 % | BODY MASS INDEX: 28.16 KG/M2 | TEMPERATURE: 98.2 F | WEIGHT: 153 LBS | DIASTOLIC BLOOD PRESSURE: 57 MMHG | HEIGHT: 62 IN

## 2019-08-02 DIAGNOSIS — R19.5 LOOSE STOOLS: Primary | ICD-10-CM

## 2019-08-02 RX ORDER — UREA 10 %
LOTION (ML) TOPICAL
COMMUNITY

## 2019-08-02 NOTE — PROGRESS NOTES
75 y.o. PATIENT REFUSED female who presents for evaluation. For about a month now, she has been having more watery stools than usual.  She saw NP Sandrine Conley in mid June for this, CMP, CBC were negative. Stool studies were negative for C. difficile, O&P along with cultures. She describes 50-50 split, sometimes watery, sometimes there's formed material in the stool, 1-2 a day at most, no associated abdominal pain, cramping, bleeding, nausea, vomiting. She has not taken any trips, no recent antibiotic usage, has not eaten anything out of the ordinary, nobody else has been afflicted with similar complaints. Last colonoscopy 2015 Dr Katt Carr. No weight loss, night sweats or other constitutional complaints.   She has been using Imodium, has not really changed her diet at all    Past Medical History:   Diagnosis Date    Arthritis     Dr Stephanie Torres; knees    Degenerative joint disease (DJD) of lumbar spine 04/2019    on mri    Diabetes mellitus ([facility] [address]     [facility]; on insulin pump    FHx: breast cancer     Hemorrhoids     Hyperlipidemia     Hypertension     Lumbar spinal stenosis 03/2019    mri     Macular degeneration     and retinopathy Dr Juanito Durant t score -1.2 spine, -2.1 hip (11/10) improved from 2007/2003;  1.7 spine, -2.1 hip (2/16); 2.8 spine, -2.2 hip (5/18) Strelitz     Past Surgical History:   Procedure Laterality Date    HX CATARACT REMOVAL      Dr Vijaya Dodd 2015   Ivan Shape      Dr. Julian Mckinnon 2005 negative; Dr Katt Carr 9/15 negative    HX ORTHOPAEDIC      DEXA 1.7 spine, -2.1 hip 2/16 Strelitz    US ABD COMP  8/02    negative     Social History     Socioeconomic History    Marital status:      Spouse name: Not on file    Number of children: 2    Years of education: Not on file    Highest education level: Not on file   Occupational History    Occupation: ret  now part time [employer]   Social Needs    Financial resource strain: Not on file    Food insecurity:     Worry: Not on file     Inability: Not on file    Transportation needs:     Medical: Not on file     Non-medical: Not on file   Tobacco Use    Smoking status: Former Smoker     Last attempt to quit: 1995     Years since quittin.9    Smokeless tobacco: Never Used   Substance and Sexual Activity    Alcohol use: Yes     Alcohol/week: 0.8 standard drinks     Types: 1 Glasses of wine per week     Comment: nightly    Drug use: No    Sexual activity: Not on file   Lifestyle    Physical activity:     Days per week: Not on file     Minutes per session: Not on file    Stress: Not on file   Relationships    Social connections:     Talks on phone: Not on file     Gets together: Not on file     Attends Nondenominational service: Not on file     Active member of club or organization: Not on file     Attends meetings of clubs or organizations: Not on file     Relationship status: Not on file    Intimate partner violence:     Fear of current or ex partner: Not on file     Emotionally abused: Not on file     Physically abused: Not on file     Forced sexual activity: Not on file   Other Topics Concern    Not on file   Social History Narrative    Not on file     Current Outpatient Medications   Medication Sig    naproxen-diphenhydramine (ALEVE PM) 220-25 mg tab Take  by mouth.  melatonin 1 mg tablet Take  by mouth.  multivit-min-FA-lycopen-lutein (CENTRUM SILVER) 0.4-300-250 mg-mcg-mcg tab Take  by mouth.  ALPRAZolam (XANAX) 0.5 mg tablet Take 1 Tab by mouth three (3) times daily as needed for Anxiety. Max Daily Amount: 1.5 mg.    insulin lispro (HUMALOG) 100 unit/mL injection by Continuous Infusion route. On insulin pump managed by DI    evening primrose oil 500 mg cap Take 500 mg by mouth nightly.  raloxifene (EVISTA) 60 mg tablet Take  by mouth daily.  simvastatin (ZOCOR) 20 mg tablet Take  by mouth nightly.       lisinopril (PRINIVIL, ZESTRIL) 10 mg tablet Take  by mouth daily.  coenzyme q10 (CO Q-10) 10 mg cap Take  by mouth. No current facility-administered medications for this visit. Allergies   Allergen Reactions    Fosamax [Alendronate] Other (comments)     GI upset     Visit Vitals  /57   Pulse 89   Temp 98.2 °F (36.8 °C) (Oral)   Resp 14   Ht 5' 2\" (1.575 m)   Wt 153 lb (69.4 kg)   SpO2 97%   BMI 27.98 kg/m²   A&O x3  Affect is appropriate. Mood stable  No apparent distress  Anicteric, no JVD, adenopathy or thyromegaly. No carotid bruits or radiated murmur  Lungs clear to auscultation, no wheezes or rales  Heart showed regular rate and rhythm. No murmur, rubs, gallops  Abdomen soft nontender, no hepatosplenomegaly or masses. Extremities without edema. Pulses 1-2+ symmetrically    Results for orders placed or performed during the hospital encounter of 06/20/19   CBC WITH AUTOMATED DIFF   Result Value Ref Range    WBC 6.8 4.6 - 13.2 K/uL    RBC 4.19 (L) 4.20 - 5.30 M/uL    HGB 13.8 12.0 - 16.0 g/dL    HCT 41.0 35.0 - 45.0 %    MCV 97.9 (H) 74.0 - 97.0 FL    MCH 32.9 24.0 - 34.0 PG    MCHC 33.7 31.0 - 37.0 g/dL    RDW 13.4 11.6 - 14.5 %    PLATELET 067 238 - 946 K/uL    MPV 10.1 9.2 - 11.8 FL    NEUTROPHILS 72 40 - 73 %    LYMPHOCYTES 19 (L) 21 - 52 %    MONOCYTES 8 3 - 10 %    EOSINOPHILS 1 0 - 5 %    BASOPHILS 0 0 - 2 %    ABS. NEUTROPHILS 4.9 1.8 - 8.0 K/UL    ABS. LYMPHOCYTES 1.3 0.9 - 3.6 K/UL    ABS. MONOCYTES 0.5 0.05 - 1.2 K/UL    ABS. EOSINOPHILS 0.1 0.0 - 0.4 K/UL    ABS.  BASOPHILS 0.0 0.0 - 0.1 K/UL    DF AUTOMATED     METABOLIC PANEL, COMPREHENSIVE   Result Value Ref Range    Sodium 139 136 - 145 mmol/L    Potassium 4.1 3.5 - 5.5 mmol/L    Chloride 108 100 - 108 mmol/L    CO2 26 21 - 32 mmol/L    Anion gap 5 3.0 - 18 mmol/L    Glucose 196 (H) 74 - 99 mg/dL    BUN 15 7.0 - 18 MG/DL    Creatinine 0.74 0.6 - 1.3 MG/DL    BUN/Creatinine ratio 20 12 - 20      GFR est AA >60 >60 ml/min/1.73m2    GFR est non-AA >60 >60 ml/min/1.73m2    Calcium 8.7 8.5 - 10.1 MG/DL    Bilirubin, total 0.4 0.2 - 1.0 MG/DL    ALT (SGPT) 24 13 - 56 U/L    AST (SGOT) 16 15 - 37 U/L    Alk. phosphatase 77 45 - 117 U/L    Protein, total 6.9 6.4 - 8.2 g/dL    Albumin 3.7 3.4 - 5.0 g/dL    Globulin 3.2 2.0 - 4.0 g/dL    A-G Ratio 1.2 0.8 - 1.7       Stool for C. difficile, O&P, cultures negative    Assessment and plan:  1. Change in bowel habits, etiology unclear. We discussed possibly sending her to GI for further work-up, decided to hold off for now. She will increase the fiber intake using supplements, use Imodium as needed or even Lomotil if necessary. Call with an update in 1 to 2 weeks        Above conditions discussed at length and patient vocalized understanding.   All questions answered to patient satisfaction

## 2019-08-02 NOTE — PROGRESS NOTES
Shae Hinojosa presents today for   Chief Complaint   Patient presents with    Diarrhea     x1 month loose stools, no abdominal cramps and no change in eating habits              Depression Screening:  3 most recent PHQ Screens 4/15/2019   Little interest or pleasure in doing things Not at all   Feeling down, depressed, irritable, or hopeless Not at all   Total Score PHQ 2 0       Learning Assessment:  Learning Assessment 3/21/2019   PRIMARY LEARNER Patient   HIGHEST LEVEL OF EDUCATION - PRIMARY LEARNER  SOME COLLEGE   BARRIERS PRIMARY LEARNER NONE   CO-LEARNER CAREGIVER No   PRIMARY LANGUAGE ENGLISH   LEARNER PREFERENCE PRIMARY DEMONSTRATION     LISTENING     PICTURES     READING     VIDEOS   ANSWERED BY PATIENT   RELATIONSHIP SELF       Abuse Screening:  Abuse Screening Questionnaire 4/15/2019   Do you ever feel afraid of your partner? N   Are you in a relationship with someone who physically or mentally threatens you? N   Is it safe for you to go home? Y       Fall Risk  Fall Risk Assessment, last 12 mths 4/15/2019   Able to walk? Yes   Fall in past 12 months? No           Coordination of Care:  1. Have you been to the ER, urgent care clinic since your last visit? Hospitalized since your last visit? no    2. Have you seen or consulted any other health care providers outside of the [address] [place] [address] since your last visit? Include any pap smears or colon screening.  no

## 2019-08-26 ENCOUNTER — TELEPHONE (OUTPATIENT)
Dept: INTERNAL MEDICINE CLINIC | Age: 74
End: 2019-08-26

## 2019-08-26 NOTE — TELEPHONE ENCOUNTER
Suggest dr Jose Gil group - pls schedule with any provider if she's willing to have us expedite - persistent loose stools

## 2019-08-27 NOTE — TELEPHONE ENCOUNTER
Patient was given phone number. She stated she will give them a call and give us a call back with appointment details to see if appointment is appropriate and if a referral is needed.

## 2019-10-29 ENCOUNTER — HOSPITAL ENCOUNTER (OUTPATIENT)
Dept: MAMMOGRAPHY | Age: 74
Discharge: HOME | End: 2019-10-29
Attending: INTERNAL MEDICINE
Payer: MEDICARE

## 2019-10-29 DIAGNOSIS — Z12.31 VISIT FOR SCREENING MAMMOGRAM: ICD-10-CM

## 2019-10-29 PROCEDURE — 77063 BREAST TOMOSYNTHESIS BI: CPT

## 2020-02-03 ENCOUNTER — ANESTHESIA EVENT (OUTPATIENT)
Dept: ENDOSCOPY | Age: 75
End: 2020-02-03
Payer: MEDICARE

## 2020-02-03 NOTE — DIABETES MGMT
Glycemic Control: Pre-op call for patient on home insulin pump  PAT referral.  Successful patient contact at [phone] and obtained the following information:  Type of diabetes: T2D  Endocrinologist:  Narendra Benitez at the [facility] in [facility] known A1c: 6% three months ago and she recently had A1c lab but will know result on 02/06/2020. Type of insulin pump:  Tandem  Type of insulin:  humalog  Basal rate(s): patient attempted to find information but she was not able to do it. Bolus: Wizard  Frequency of infusion set change: every 3 days. Does patient have any skin problem related to use of insulin pump? No.  Is patient able to recognize symptoms when blood sugar is below 70? Yes. Is patient able to state how to treat when blood sugar is below 70? Yes. Patient reported that she's also wearing a continuous glucose monitoring device therefore she's able to monitor her blood sugar closely. To prevent hypoglycemia, patient plan to suspend use of her insulin pump when blood sugar is down to 90 and will recheck her blood sugar in an hour. She will resume use of insulin pump when blood sugar is at least 150. Instructions given to patient:  Follow PAT instruction regarding NPO after midnight the night before surgery. Continue use of insulin pump after midnight at basal rate(s). No insulin bolus in the morning before procedure since she's not allowed to eat breakfast.  Low blood sugar below 70 is an emergency. Follow instructions for treatment if she experience low blood sugar after midnight, but do not take any solids or dark liquids for treatment. Instead, take 4 oz of clear liquids like apple juice, 7Up, ginger ale, or 2-3 teaspoon of regular sugar mixed in 1/2 of water. Then check BG within 15 minutes and repeat treatment until blood sugar is above 70.   Report episode of low blood sugar to the nurse and doctor upon arrival including the time of low blood sugar, and what she used to treat, and the amount. Patient verbalized understanding of above instructions. Patient stated that she's having a colonoscopy procedure in the morning, 02/04/2020, and then disch to home the same day.     Jennifer Sanders RN [facility]  Pager: [phone]

## 2020-02-03 NOTE — DIABETES MGMT
GLYCEMIC CONTROL:    Received referral from PAT on Friday, 01/31/2020 with request to contact patient pre-op prior to appointment for colonoscopy with Dr. Marcelo Bardales on Tuesday, 02/04/2020. Received report that patient is on insulin pump at home. Attempted to contact patient at [phone] but no answer. Sent vm for patient to return my call at [phone] and leave message if I'm not available.     Butch De Jesus RN [facility]  Pager: [phone]

## 2020-02-04 ENCOUNTER — HOSPITAL ENCOUNTER (OUTPATIENT)
Age: 75
Setting detail: OUTPATIENT SURGERY
Discharge: HOME | End: 2020-02-04
Attending: INTERNAL MEDICINE | Admitting: INTERNAL MEDICINE
Payer: MEDICARE

## 2020-02-04 ENCOUNTER — ANESTHESIA (OUTPATIENT)
Dept: ENDOSCOPY | Age: 75
End: 2020-02-04
Payer: MEDICARE

## 2020-02-04 VITALS
DIASTOLIC BLOOD PRESSURE: 66 MMHG | BODY MASS INDEX: 27.38 KG/M2 | HEIGHT: 61 IN | WEIGHT: 145 LBS | OXYGEN SATURATION: 93 % | TEMPERATURE: 97.4 F | RESPIRATION RATE: 14 BRPM | SYSTOLIC BLOOD PRESSURE: 115 MMHG | HEART RATE: 74 BPM

## 2020-02-04 LAB
GLUCOSE BLD STRIP.AUTO-MCNC: 125 MG/DL (ref 70–110)
GLUCOSE BLD STRIP.AUTO-MCNC: 143 MG/DL (ref 70–110)

## 2020-02-04 PROCEDURE — 74011250636 HC RX REV CODE- 250/636: Performed by: NURSE ANESTHETIST, CERTIFIED REGISTERED

## 2020-02-04 PROCEDURE — 77030021593 HC FCPS BIOP ENDOSC BSC -A: Performed by: INTERNAL MEDICINE

## 2020-02-04 PROCEDURE — 82962 GLUCOSE BLOOD TEST: CPT

## 2020-02-04 PROCEDURE — 74011250637 HC RX REV CODE- 250/637: Performed by: NURSE ANESTHETIST, CERTIFIED REGISTERED

## 2020-02-04 PROCEDURE — 76060000032 HC ANESTHESIA 0.5 TO 1 HR: Performed by: INTERNAL MEDICINE

## 2020-02-04 PROCEDURE — 74011000250 HC RX REV CODE- 250: Performed by: NURSE ANESTHETIST, CERTIFIED REGISTERED

## 2020-02-04 PROCEDURE — 76040000007: Performed by: INTERNAL MEDICINE

## 2020-02-04 PROCEDURE — 77030008565 HC TBNG SUC IRR ERBE -B: Performed by: INTERNAL MEDICINE

## 2020-02-04 PROCEDURE — 88305 TISSUE EXAM BY PATHOLOGIST: CPT

## 2020-02-04 PROCEDURE — 77030018846 HC SOL IRR STRL H20 ICUM -A: Performed by: INTERNAL MEDICINE

## 2020-02-04 RX ORDER — SODIUM CHLORIDE, SODIUM LACTATE, POTASSIUM CHLORIDE, CALCIUM CHLORIDE 600; 310; 30; 20 MG/100ML; MG/100ML; MG/100ML; MG/100ML
25 INJECTION, SOLUTION INTRAVENOUS CONTINUOUS
Status: DISCONTINUED | OUTPATIENT
Start: 2020-02-04 | End: 2020-02-04 | Stop reason: HOSPADM

## 2020-02-04 RX ORDER — LIDOCAINE HYDROCHLORIDE 10 MG/ML
0.1 INJECTION, SOLUTION EPIDURAL; INFILTRATION; INTRACAUDAL; PERINEURAL AS NEEDED
Status: DISCONTINUED | OUTPATIENT
Start: 2020-02-04 | End: 2020-02-04 | Stop reason: HOSPADM

## 2020-02-04 RX ORDER — LIDOCAINE HYDROCHLORIDE 20 MG/ML
INJECTION, SOLUTION EPIDURAL; INFILTRATION; INTRACAUDAL; PERINEURAL AS NEEDED
Status: DISCONTINUED | OUTPATIENT
Start: 2020-02-04 | End: 2020-02-04 | Stop reason: HOSPADM

## 2020-02-04 RX ORDER — INSULIN LISPRO 100 [IU]/ML
INJECTION, SOLUTION INTRAVENOUS; SUBCUTANEOUS ONCE
Status: DISCONTINUED | OUTPATIENT
Start: 2020-02-04 | End: 2020-02-04 | Stop reason: HOSPADM

## 2020-02-04 RX ORDER — SODIUM CHLORIDE, SODIUM LACTATE, POTASSIUM CHLORIDE, CALCIUM CHLORIDE 600; 310; 30; 20 MG/100ML; MG/100ML; MG/100ML; MG/100ML
50 INJECTION, SOLUTION INTRAVENOUS CONTINUOUS
Status: DISCONTINUED | OUTPATIENT
Start: 2020-02-04 | End: 2020-02-04 | Stop reason: HOSPADM

## 2020-02-04 RX ORDER — FAMOTIDINE 20 MG/1
20 TABLET, FILM COATED ORAL ONCE
Status: COMPLETED | OUTPATIENT
Start: 2020-02-04 | End: 2020-02-04

## 2020-02-04 RX ORDER — SODIUM CHLORIDE 0.9 % (FLUSH) 0.9 %
5-40 SYRINGE (ML) INJECTION AS NEEDED
Status: DISCONTINUED | OUTPATIENT
Start: 2020-02-04 | End: 2020-02-04 | Stop reason: HOSPADM

## 2020-02-04 RX ORDER — SODIUM CHLORIDE 0.9 % (FLUSH) 0.9 %
5-40 SYRINGE (ML) INJECTION EVERY 8 HOURS
Status: DISCONTINUED | OUTPATIENT
Start: 2020-02-04 | End: 2020-02-04 | Stop reason: HOSPADM

## 2020-02-04 RX ORDER — MAGNESIUM SULFATE 100 %
4 CRYSTALS MISCELLANEOUS AS NEEDED
Status: DISCONTINUED | OUTPATIENT
Start: 2020-02-04 | End: 2020-02-04 | Stop reason: HOSPADM

## 2020-02-04 RX ORDER — PROPOFOL 10 MG/ML
INJECTION, EMULSION INTRAVENOUS AS NEEDED
Status: DISCONTINUED | OUTPATIENT
Start: 2020-02-04 | End: 2020-02-04 | Stop reason: HOSPADM

## 2020-02-04 RX ORDER — DEXTROSE 50 % IN WATER (D50W) INTRAVENOUS SYRINGE
25-50 AS NEEDED
Status: DISCONTINUED | OUTPATIENT
Start: 2020-02-04 | End: 2020-02-04 | Stop reason: HOSPADM

## 2020-02-04 RX ADMIN — PROPOFOL 30 MG: 10 INJECTION, EMULSION INTRAVENOUS at 10:06

## 2020-02-04 RX ADMIN — PROPOFOL 30 MG: 10 INJECTION, EMULSION INTRAVENOUS at 10:03

## 2020-02-04 RX ADMIN — SODIUM CHLORIDE, SODIUM LACTATE, POTASSIUM CHLORIDE, AND CALCIUM CHLORIDE 25 ML/HR: 600; 310; 30; 20 INJECTION, SOLUTION INTRAVENOUS at 09:16

## 2020-02-04 RX ADMIN — PROPOFOL 30 MG: 10 INJECTION, EMULSION INTRAVENOUS at 10:10

## 2020-02-04 RX ADMIN — FAMOTIDINE 20 MG: 20 TABLET ORAL at 09:18

## 2020-02-04 RX ADMIN — PROPOFOL 60 MG: 10 INJECTION, EMULSION INTRAVENOUS at 09:59

## 2020-02-04 RX ADMIN — LIDOCAINE HYDROCHLORIDE 60 MG: 20 INJECTION, SOLUTION EPIDURAL; INFILTRATION; INTRACAUDAL; PERINEURAL at 09:59

## 2020-02-04 NOTE — ANESTHESIA PREPROCEDURE EVALUATION
Anesthetic History   No history of anesthetic complications            Review of Systems / Medical History  Patient summary reviewed, nursing notes reviewed and pertinent labs reviewed    Pulmonary  Within defined limits                 Neuro/Psych   Within defined limits           Cardiovascular    Hypertension              Exercise tolerance: >4 METS     GI/Hepatic/Renal  Within defined limits              Endo/Other    Diabetes: using insulin    Arthritis     Other Findings              Physical Exam    Airway  Mallampati: II  TM Distance: 4 - 6 cm  Neck ROM: normal range of motion   Mouth opening: Normal     Cardiovascular    Rhythm: regular  Rate: normal         Dental    Dentition: Full upper dentures and Full lower dentures     Pulmonary  Breath sounds clear to auscultation               Abdominal  GI exam deferred       Other Findings            Anesthetic Plan    ASA: 3  Anesthesia type: MAC          Induction: Intravenous  Anesthetic plan and risks discussed with: Patient

## 2020-02-04 NOTE — H&P
[facility]    [email or web address]      Impression:   1.chronic diarrhea       Plan:     1. [place] bxs mac all risks beenfts and alt discussed     Chief Complaint: chronic diarrhea       HPI:  Reema Deleon is a 75 y.o. female who is being seen on consult for chronic [id number] . PMH:   Past Medical History:   Diagnosis Date    Arthritis     Dr Norbert Ham; knees    Degenerative joint disease (DJD) of lumbar spine 2019    on mri    Diabetes mellitus ([facility] [address]     [facility]; on insulin pump    FHx: breast cancer     Hemorrhoids     Hyperlipidemia     Hypertension     Lumbar spinal stenosis 2019    mri     Macular degeneration     and retinopathy Dr Jones Dates t score -1.2 spine, -2.1 hip (11/10) improved from ;  1.7 spine, -2.1 hip (); 2.8 spine, -2.2 hip () Strelitz       PSH:   Past Surgical History:   Procedure Laterality Date    Sylvia Beckhamer      Dr Enrique Lee    Ghada Garvey  negative; Dr Senaida Kawasaki 9/15 negative    HX ORTHOPAEDIC      DEXA 1.7 spine, -2.1 hip  Strelitz    US ABD COMP      negative       Social HX:   Social History     Socioeconomic History    Marital status:      Spouse name: Not on file    Number of children: 2    Years of education: Not on file    Highest education level: Not on file   Occupational History    Occupation: ret  now part time [employer]   Social Needs    Financial resource strain: Not on file    Food insecurity:     Worry: Not on file     Inability: Not on file    Transportation needs:     Medical: Not on file     Non-medical: Not on file   Tobacco Use    Smoking status: Current Some Day Smoker     Packs/day: 0.50     Last attempt to quit: 1995     Years since quittin.4    Smokeless tobacco: Never Used   Substance and Sexual Activity    Alcohol use:  Yes     Alcohol/week: 0.8 standard drinks     Types: 1 Glasses of wine per week     Comment: nightly    Drug use: No    Sexual activity: Not on file   Lifestyle    Physical activity:     Days per week: Not on file     Minutes per session: Not on file    Stress: Not on file   Relationships    Social connections:     Talks on phone: Not on file     Gets together: Not on file     Attends Mu-ism service: Not on file     Active member of club or organization: Not on file     Attends meetings of clubs or organizations: Not on file     Relationship status: Not on file    Intimate partner violence:     Fear of current or ex partner: Not on file     Emotionally abused: Not on file     Physically abused: Not on file     Forced sexual activity: Not on file   Other Topics Concern    Not on file   Social History Narrative    Not on file       FHX:   Family History   Problem Relation Age of Onset    Cancer Mother         breast    Breast Cancer Mother        Allergy:   Allergies   Allergen Reactions    Fosamax [Alendronate] Other (comments)     GI upset       Home Medications:     Medications Prior to Admission   Medication Sig    calcium-cholecalciferol, d3, (CALCIUM 600 + D) 600-125 mg-unit tab Take  by mouth.  naproxen-diphenhydramine (ALEVE PM) 220-25 mg tab Take  by mouth nightly.  melatonin 1 mg tablet Take  by mouth nightly.  multivit-min-FA-lycopen-lutein (CENTRUM SILVER) 0.4-300-250 mg-mcg-mcg tab Take  by mouth.  ALPRAZolam (XANAX) 0.5 mg tablet Take 1 Tab by mouth three (3) times daily as needed for Anxiety. Max Daily Amount: 1.5 mg.    coenzyme q10 (CO Q-10) 10 mg cap Take  by mouth.  insulin lispro (HUMALOG) 100 unit/mL injection by Continuous Infusion route. On insulin pump managed by DI    evening primrose oil 500 mg cap Take 500 mg by mouth nightly.  raloxifene (EVISTA) 60 mg tablet Take  by mouth daily.  simvastatin (ZOCOR) 20 mg tablet Take  by mouth nightly.       lisinopril (PRINIVIL, ZESTRIL) 10 mg tablet Take  by mouth nightly. Review of Systems:     Constitutional: No fevers, chills, weight loss, fatigue. Skin: No rashes, pruritis, jaundice, ulcerations, erythema. HENT: No headaches, nosebleeds, sinus pressure, rhinorrhea, sore throat. Eyes: No visual changes, blurred vision, eye pain, photophobia, jaundice. Cardiovascular: No chest pain, heart palpitations. Respiratory: No cough, SOB, wheezing, chest discomfort, orthopnea. Gastrointestinal: Diarrhea lower discomfort    Genitourinary: No dysuria, bleeding, discharge, pyuria. Musculoskeletal: No weakness, arthralgias, wasting. Endo: No sweats. Heme: No bruising, easy bleeding. Allergies: As noted. Neurological: Cranial nerves intact. Alert and oriented. Gait not assessed. Psychiatric:  No anxiety, depression, hallucinations. Visit Vitals  Ht 5' 1\" (1.549 m)   Wt 68 kg (150 lb)   BMI 28.34 kg/m²       Physical Assessment:     constitutional: appearance: well developed, well nourished, normal habitus, no deformities, in no acute distress. skin: inspection: no rashes, ulcers, icterus or other lesions; no clubbing or telangiectasias. palpation: no induration or subcutaneos nodules. eyes: inspection: normal conjunctivae and lids; no jaundice pupils: symmetrical, normoreactive to light, normal accommodation and size. ENMT: mouth: normal oral mucosa,lips and gums; good dentition. oropharynx: normal tongue, hard and soft palate; posterior pharynx without erythema, exudate or lesions. neck: no masses organomegaly or tenderness. respiratory: effort: normal chest excursion; no intercostal retraction or accessory muscle use. cardiovascular: abdominal aorta: normal size and position; no bruits. palpation: PMI of normal size and position; normal rhythm; no thrill or murmurs. abdominal: abdomen: normal consistency; no tenderness or masses. hernias: no hernias appreciated.  liver: normal size and consistency. spleen: not palpable. rectal: hemoccult/guaiac: not performed. musculoskeletal: no deformities or muscle wasting   lymphatic: axilae: not palpable. groin: not palpable. neck: within normal limits. other: not palpable. neurologic: cranial nerves: II-XII normal.   psychiatric: judgement/insight: within normal limits. memory: within normal limits for recent and remote events. mood and affect: no evidence of depression, anxiety or agitation. orientation: oriented to time, space and person. Basic Metabolic Profile   No results for input(s): NA, K, CL, CO2, BUN, GLU, CA, MG, PHOS in the last 72 hours. No lab exists for component: CREAT      CBC w/Diff    No results for input(s): WBC, RBC, HGB, HCT, MCV, MCH, MCHC, RDW, PLT, HGBEXT, HCTEXT, PLTEXT in the last 72 hours. No lab exists for component: MPV No results for input(s): GRANS, LYMPH, EOS, PRO, MYELO, METAS, BLAST in the last 72 hours. No lab exists for component: MONO, BASO     Hepatic Function   No results for input(s): ALB, TP, TBILI, GPT, SGOT, AP, AML, LPSE in the last 72 hours. No lab exists for component: Deisy Harvey MD, M.D. [facility], [address] [place] [address]  [email or web address]

## 2020-02-04 NOTE — DISCHARGE INSTRUCTIONS
Colonoscopy: What to Expect at [address] [place] [address]  After you have a colonoscopy, you will stay at the clinic for 1 to 2 hours until the medicines wear off. Then you can go home. But you will need to arrange for a ride. Your doctor will tell you when you can eat and do your other usual activities. Your doctor will talk to you about when you will need your next colonoscopy. Your doctor can help you decide how often you need to be checked. This will depend on the results of your test and your risk for colorectal cancer. After the test, you may be bloated or have gas pains. You may need to pass gas. If a biopsy was done or a polyp was removed, you may have streaks of blood in your stool (feces) for a few days. Problems such as heavy rectal bleeding may not occur until several weeks after the test. This isn't common. But it can happen after polyps are removed. This care sheet gives you a general idea about how long it will take for you to recover. But each person recovers at a different pace. Follow the steps below to get better as quickly as possible. How can you care for yourself at home? Activity    · Rest when you feel tired.     · You can do your normal activities when it feels okay to do so. Diet    · Follow your doctor's directions for eating.     · Unless your doctor has told you not to, drink plenty of fluids. This helps to replace the fluids that were lost during the colon prep.     · Do not drink alcohol. Medicines    · Your doctor will tell you if and when you can restart your medicines. He or she will also give you instructions about taking any new medicines.     · If you take blood thinners, such as warfarin (Coumadin), clopidogrel (Plavix), or aspirin, be sure to talk to your doctor. He or she will tell you if and when to start taking those medicines again.  Make sure that you understand exactly what your doctor wants you to do.     · If polyps were removed or a biopsy was done during the test, your doctor may tell you not to take aspirin or other anti-inflammatory medicines for a few days. These include ibuprofen (Advil, Motrin) and naproxen (Aleve). Other instructions    · For your safety, do not drive or operate machinery until the medicine wears off and you can think clearly. Your doctor may tell you not to drive or operate machinery until the day after your test.     · Do not sign legal documents or make major decisions until the medicine wears off and you can think clearly. The anesthesia can make it hard for you to fully understand what you are agreeing to. Follow-up care is a key part of your treatment and safety. Be sure to make and go to all appointments, and call your doctor if you are having problems. It's also a good idea to know your test results and keep a list of the medicines you take. When should you call for help? Call 911 anytime you think you may need emergency care. For example, call if:    · You passed out (lost consciousness).     · You pass maroon or bloody stools.     · You have trouble breathing.    Call your doctor now or seek immediate medical care if:    · You have pain that does not get better after you take pain medicine.     · You are sick to your stomach or cannot drink fluids.     · You have new or worse belly pain.     · You have blood in your stools.     · You have a fever.     · You cannot pass stools or gas.    Watch closely for changes in your health, and be sure to contact your doctor if you have any problems. Where can you learn more? Go to http://imani-wm.info/. Enter E264 in the search box to learn more about \"Colonoscopy: What to Expect at Home. \"  Current as of: December 19, 2018  Content Version: 12.2  © 7320-2388 [employer]. Care instructions adapted under license by [employer] (which disclaims liability or warranty for this information).  If you have questions about a medical condition or this instruction, always ask your healthcare professional. [place] [address] any warranty or liability for your use of this information. DISCHARGE SUMMARY from Nurse    PATIENT INSTRUCTIONS:    After general anesthesia or intravenous sedation, for 24 hours or while taking prescription Narcotics:  · Limit your activities  · Do not drive and operate hazardous machinery  · Do not make important personal or business decisions  · Do  not drink alcoholic beverages  · If you have not urinated within 8 hours after discharge, please contact your surgeon on call. Report the following to your surgeon:  · Excessive pain, swelling, redness or odor of or around the surgical area  · Temperature over 100.5  · Nausea and vomiting lasting longer than 4 hours or if unable to take medications  · Any signs of decreased circulation or nerve impairment to extremity: change in color, persistent  numbness, tingling, coldness or increase pain  · Any questions    *  Please give a list of your current medications to your Primary Care Provider. *  Please update this list whenever your medications are discontinued, doses are      changed, or new medications (including over-the-counter products) are added. *  Please carry medication information at all times in case of emergency situations. These are general instructions for a healthy lifestyle:    No smoking/ No tobacco products/ Avoid exposure to second hand smoke  Surgeon General's Warning:  Quitting smoking now greatly reduces serious risk to your health.     Obesity, smoking, and sedentary lifestyle greatly increases your risk for illness    A healthy diet, regular physical exercise & weight monitoring are important for maintaining a healthy lifestyle    You may be retaining fluid if you have a history of heart failure or if you experience any of the following symptoms:  Weight gain of 3 pounds or more overnight or 5 pounds in a week, increased swelling in our hands or feet or shortness of breath while lying flat in bed. Please call your doctor as soon as you notice any of these symptoms; do not wait until your next office visit. The discharge information has been reviewed with the patient/neighbor. The patient verbalized understanding. Discharge medications reviewed with the patient and appropriate educational materials and side effects teaching were provided.   ___________________________________________________________________________________________________________________________________

## 2020-02-04 NOTE — ANESTHESIA POSTPROCEDURE EVALUATION
Procedure(s):  COLONOSCOPY with bx's. MAC    Anesthesia Post Evaluation      Multimodal analgesia: multimodal analgesia used between 6 hours prior to anesthesia start to PACU discharge  Patient location during evaluation: bedside  Patient participation: complete - patient participated  Level of consciousness: awake  Pain management: adequate  Airway patency: patent  Anesthetic complications: no  Cardiovascular status: stable  Respiratory status: acceptable  Hydration status: acceptable  Post anesthesia nausea and vomiting:  controlled      Vitals Value Taken Time   /42 2/4/2020 10:29 AM   Temp 36.9 °C (98.4 °F) 2/4/2020 10:21 AM   Pulse 71 2/4/2020 10:31 AM   Resp 18 2/4/2020 10:31 AM   SpO2 100 % 2/4/2020 10:31 AM   Vitals shown include unvalidated device data.

## 2020-04-01 ENCOUNTER — TELEPHONE (OUTPATIENT)
Dept: INTERNAL MEDICINE CLINIC | Age: 75
End: 2020-04-01

## 2020-04-01 NOTE — TELEPHONE ENCOUNTER
LMTCB      When patient returns call please reschedule Physical Labs and Physical for July. April appointments have been canceled.

## 2020-06-25 ENCOUNTER — TELEPHONE (OUTPATIENT)
Dept: INTERNAL MEDICINE CLINIC | Age: 75
End: 2020-06-25

## 2020-06-25 DIAGNOSIS — E08.21 DIABETES MELLITUS DUE TO UNDERLYING CONDITION WITH DIABETIC NEPHROPATHY, WITHOUT LONG-TERM CURRENT USE OF INSULIN (HCC): Primary | ICD-10-CM

## 2020-06-25 NOTE — TELEPHONE ENCOUNTER
----- Message from [facility] sent at 6/25/2020  1:27 PM EDT -----  Hello      The above patient is scheduled for labs tomorrow. Can you place an order please.       Thanks

## 2020-06-26 ENCOUNTER — APPOINTMENT (OUTPATIENT)
Dept: INTERNAL MEDICINE CLINIC | Age: 75
End: 2020-06-26

## 2020-06-26 ENCOUNTER — HOSPITAL ENCOUNTER (OUTPATIENT)
Dept: LAB | Age: 75
Discharge: HOME | End: 2020-06-26
Payer: MEDICARE

## 2020-06-26 DIAGNOSIS — E08.21 DIABETES MELLITUS DUE TO UNDERLYING CONDITION WITH DIABETIC NEPHROPATHY, WITHOUT LONG-TERM CURRENT USE OF INSULIN (HCC): ICD-10-CM

## 2020-06-26 LAB
ALBUMIN SERPL-MCNC: 3.5 G/DL (ref 3.4–5)
ALBUMIN/GLOB SERPL: 1.1 {RATIO} (ref 0.8–1.7)
ALP SERPL-CCNC: 72 U/L (ref 45–117)
ALT SERPL-CCNC: 21 U/L (ref 13–56)
ANION GAP SERPL CALC-SCNC: 7 MMOL/L (ref 3–18)
AST SERPL-CCNC: 17 U/L (ref 10–38)
BILIRUB SERPL-MCNC: 0.5 MG/DL (ref 0.2–1)
BUN SERPL-MCNC: 15 MG/DL (ref 7–18)
BUN/CREAT SERPL: 18 (ref 12–20)
CALCIUM SERPL-MCNC: 8.8 MG/DL (ref 8.5–10.1)
CHLORIDE SERPL-SCNC: 108 MMOL/L (ref 100–111)
CHOLEST SERPL-MCNC: 152 MG/DL
CO2 SERPL-SCNC: 27 MMOL/L (ref 21–32)
CREAT SERPL-MCNC: 0.85 MG/DL (ref 0.6–1.3)
CREAT UR-MCNC: 96 MG/DL (ref 30–125)
ERYTHROCYTE [DISTWIDTH] IN BLOOD BY AUTOMATED COUNT: 14.3 % (ref 11.6–14.5)
GLOBULIN SER CALC-MCNC: 3.2 G/DL (ref 2–4)
GLUCOSE SERPL-MCNC: 169 MG/DL (ref 74–99)
HBA1C MFR BLD: 7.4 % (ref 4.2–5.6)
HCT VFR BLD AUTO: 41.4 % (ref 35–45)
HDLC SERPL-MCNC: 91 MG/DL (ref 40–60)
HDLC SERPL: 1.7 {RATIO} (ref 0–5)
HGB BLD-MCNC: 13.9 G/DL (ref 12–16)
LDLC SERPL CALC-MCNC: 51.6 MG/DL (ref 0–100)
LIPID PROFILE,FLP: ABNORMAL
MCH RBC QN AUTO: 32.6 PG (ref 24–34)
MCHC RBC AUTO-ENTMCNC: 33.6 G/DL (ref 31–37)
MCV RBC AUTO: 97 FL (ref 74–97)
MICROALBUMIN UR-MCNC: 0.9 MG/DL (ref 0–3)
MICROALBUMIN/CREAT UR-RTO: 9 MG/G (ref 0–30)
PLATELET # BLD AUTO: 280 K/UL (ref 135–420)
PMV BLD AUTO: 10 FL (ref 9.2–11.8)
POTASSIUM SERPL-SCNC: 4.2 MMOL/L (ref 3.5–5.5)
PROT SERPL-MCNC: 6.7 G/DL (ref 6.4–8.2)
RBC # BLD AUTO: 4.27 M/UL (ref 4.2–5.3)
SODIUM SERPL-SCNC: 142 MMOL/L (ref 136–145)
TRIGL SERPL-MCNC: 47 MG/DL (ref ?–150)
TSH SERPL DL<=0.05 MIU/L-ACNC: 3.81 UIU/ML (ref 0.36–3.74)
VLDLC SERPL CALC-MCNC: 9.4 MG/DL
WBC # BLD AUTO: 6.8 K/UL (ref 4.6–13.2)

## 2020-06-26 PROCEDURE — 84443 ASSAY THYROID STIM HORMONE: CPT

## 2020-06-26 PROCEDURE — 83036 HEMOGLOBIN GLYCOSYLATED A1C: CPT

## 2020-06-26 PROCEDURE — 85027 COMPLETE CBC AUTOMATED: CPT

## 2020-06-26 PROCEDURE — 80061 LIPID PANEL: CPT

## 2020-06-26 PROCEDURE — 82043 UR ALBUMIN QUANTITATIVE: CPT

## 2020-06-26 PROCEDURE — 80053 COMPREHEN METABOLIC PANEL: CPT

## 2020-06-26 PROCEDURE — 36415 COLL VENOUS BLD VENIPUNCTURE: CPT

## 2020-07-06 NOTE — PROGRESS NOTES
75 y.o. white female who presents for evaluation. She continues to see NP Adal at the [facility]. No polyuria, polydipsia, nocturia. No neuropathy symptoms. Last a1c was 7.2 per her report. Sees Dr Guilherme Buitrago also    Denies any cardiovascular complaints. She walks the dog daily short distances, no other exercise due to motivational issues    Denies GI or  complaints.   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 Jerry Nava 2005 neg; Dr Katie Johnson 9/15 neg; Dr Jeremy Cassidy 2/20 collagenous colitis    [facility]      Dr Mo Khan 2015    HX ORTHOPAEDIC      DEXA 1.7 spine, -2.1 hip 2/16 [facility]    US ABD COMP  8/02    negative     Social History     Socioeconomic History    Marital status:      Spouse name: Not on file    Number of children: 2    Years of education: Not on file    Highest education level: Not on file   Occupational History    Occupation: ret  now part time [employer]   Social Needs    Financial resource strain: Not on file    Food insecurity     Worry: Not on file     Inability: Not on file    Transportation needs     Medical: Not on file     Non-medical: Not on file   Tobacco Use    Smoking status: Current Some Day Smoker     Packs/day: 0.50     Last attempt to quit: 1995     Years since quittin.9    Smokeless tobacco: Never Used   Substance and Sexual Activity    Alcohol use: Yes     Alcohol/week: 0.8 standard drinks     Types: 1 Glasses of wine per week     Comment: nightly    Drug use: No    Sexual activity: Not on file   Lifestyle    Physical activity     Days per week: Not on file     Minutes per session: Not on file    Stress: Not on file   Relationships    Social connections     Talks on phone: Not on file     Gets together: Not on file     Attends Alevism service: Not on file     Active member of club or organization: Not on file     Attends meetings of clubs or organizations: Not on file     Relationship status: Not on file    Intimate partner violence     Fear of current or ex partner: Not on file     Emotionally abused: Not on file     Physically abused: Not on file     Forced sexual activity: Not on file   Other Topics Concern    Not on file   Social History Narrative    Not on file     Current Outpatient Medications   Medication Sig    budesonide (ENTOCORT EC) 3 mg capsule Take  by mouth three (3) times daily.  calcium-cholecalciferol, d3, (CALCIUM 600 + D) 600-125 mg-unit tab Take  by mouth.  naproxen-diphenhydramine (ALEVE PM) 220-25 mg tab Take  by mouth nightly.  melatonin 1 mg tablet Take  by mouth nightly.  multivit-min-FA-lycopen-lutein (CENTRUM SILVER) 0.4-300-250 mg-mcg-mcg tab Take  by mouth.  ALPRAZolam (XANAX) 0.5 mg tablet Take 1 Tab by mouth three (3) times daily as needed for Anxiety. Max Daily Amount: 1.5 mg.    coenzyme q10 (CO Q-10) 10 mg cap Take  by mouth.  insulin lispro (HUMALOG) 100 unit/mL injection by Continuous Infusion route. On insulin pump managed by DI    evening primrose oil 500 mg cap Take 500 mg by mouth nightly.  raloxifene (EVISTA) 60 mg tablet Take  by mouth daily.       simvastatin (ZOCOR) 20 mg tablet Take  by mouth nightly.  lisinopril (PRINIVIL, ZESTRIL) 10 mg tablet Take  by mouth nightly. No current facility-administered medications for this visit. Allergies   Allergen Reactions    Fosamax [Alendronate] Other (comments)     GI upset     REVIEW OF SYSTEMS:  gyn Dr Miguelina Richards 2012, mammo 10/18, sees Dr Quique Crews, last DEXA 2/16, colo 9/15 Dr Shannan Hernandez  Ophtho - no vision change or eye pain  Oral - no mouth pain, tongue or tooth problems  Ears - no hearing loss, ear pain, fullness, no swallowing problems  Cardiac - no CP, PND, orthopnea, edema, palpitations or syncope  Chest - no breast masses  Resp - no wheezing, chronic coughing, dyspnea  GI - no heartburn, nausea, vomiting, change in bowel habits, bleeding, hemorrhoids  Urinary - no dysuria, hematuria, flank pain, urgency, frequency  Ortho - no swelling, dec ROM, myalgias    Visit Vitals  /61   Pulse 82   Temp 96.8 °F (36 °C) (Temporal)   Resp 14   Ht 5' 1\" (1.549 m)   Wt 147 lb (66.7 kg)   SpO2 95%   BMI 27.78 kg/m²      Affect is appropriate. Mood stable  No apparent distress  HEENT --Anicteric sclerae, tympanic membranes normal,  ear canals normal.  No thyromegaly, JVD, or bruits. PERRL, EOMI, conjunctiva and lids normal.    Lungs --Clear to auscultation and percussion, normal percussion. Heart --Regular rate and rhythm, no murmurs, rubs, gallops, or clicks. Chest wall --Nontender to palpation. PMI normal.  Abdomen -- Soft and nontender, no hepatosplenomegaly or masses. Extremities -- Without cyanosis, clubbing, edema. 2+ pulses equally and bilaterally.     LABS   From 5/12 showed   gluc 155, cr 0.69, gfr 90,  alt 12,          chol 149, tg 75, hdl 76, ldl-c 58,      hb 13.2, plt 290, sadia neg  From 11/13 showed gluc 134, cr 0.69, gfr>60,    hba1c 7.3,      wbc 7.9, hb 11.1, plt 235, ck/trop neg  From 1/14 showed                     umar 9.0  From 4/14 showed        hba1c 7.0, chol 162, tg 73, hdl 98, ldl-c 50  From 7/15 showed gluc 101, cr 0.76, gfr>60, alt<5,  hba1c 8.0, chol 152, tg 49, hdl 81, ldl-c 61,  wbc 5.3, hb 13.6, plt 245, umar 4.2  From 3/17 showed   gluc 90,   cr 0.80, gfr>60, alt 20, hba1c 8.2, chol 156, tg 58, hdl 97, ldl-c 47,  wbc 6.1, hb 13.5, plt 279, umar 7.0  From 3/18 showed   gluc 84,   cr 0.68, gfr>60, alt 20, hba1c 7.9, chol 135, tg 52, hdl 90, ldl-c 35,  wbc 4.5, hb 14.7, plt 292, umar 11  From 4/19 showed   gluc 90,   cr 0.80, gfr>60, alt 20, hba1c 8.0, chol 154, tg 61, hdl 86, ldl-c 56,  wbc 6.0, hb 14.1, plt 287, umar 16    Results for orders placed or performed during the hospital encounter of 06/26/20   CBC W/O DIFF   Result Value Ref Range    WBC 6.8 4.6 - 13.2 K/uL    RBC 4.27 4.20 - 5.30 M/uL    HGB 13.9 12.0 - 16.0 g/dL    HCT 41.4 35.0 - 45.0 %    MCV 97.0 74.0 - 97.0 FL    MCH 32.6 24.0 - 34.0 PG    MCHC 33.6 31.0 - 37.0 g/dL    RDW 14.3 11.6 - 14.5 %    PLATELET 434 664 - 683 K/uL    MPV 10.0 9.2 - 11.8 FL   LIPID PANEL   Result Value Ref Range    LIPID PROFILE          Cholesterol, total 152 <200 MG/DL    Triglyceride 47 <150 MG/DL    HDL Cholesterol 91 (H) 40 - 60 MG/DL    LDL, calculated 51.6 0 - 100 MG/DL    VLDL, calculated 9.4 MG/DL    CHOL/HDL Ratio 1.7 0 - 5.0     METABOLIC PANEL, COMPREHENSIVE   Result Value Ref Range    Sodium 142 136 - 145 mmol/L    Potassium 4.2 3.5 - 5.5 mmol/L    Chloride 108 100 - 111 mmol/L    CO2 27 21 - 32 mmol/L    Anion gap 7 3.0 - 18 mmol/L    Glucose 169 (H) 74 - 99 mg/dL    BUN 15 7.0 - 18 MG/DL    Creatinine 0.85 0.6 - 1.3 MG/DL    BUN/Creatinine ratio 18 12 - 20      GFR est AA >60 >60 ml/min/1.73m2    GFR est non-AA >60 >60 ml/min/1.73m2    Calcium 8.8 8.5 - 10.1 MG/DL    Bilirubin, total 0.5 0.2 - 1.0 MG/DL    ALT (SGPT) 21 13 - 56 U/L    AST (SGOT) 17 10 - 38 U/L    Alk.  phosphatase 72 45 - 117 U/L    Protein, total 6.7 6.4 - 8.2 g/dL    Albumin 3.5 3.4 - 5.0 g/dL    Globulin 3.2 2.0 - 4.0 g/dL    A-G Ratio 1.1 0.8 - 1.7     MICROALBUMIN, UR, RAND W/ MICROALB/CREAT RATIO   Result Value Ref Range    Microalbumin,urine random 0.90 0 - 3.0 MG/DL    Creatinine, urine 96.00 30 - 125 mg/dL    Microalbumin/Creat ratio (mg/g creat) 9 0 - 30 mg/g   HEMOGLOBIN A1C W/O EAG   Result Value Ref Range    Hemoglobin A1c 7.4 (H) 4.2 - 5.6 %   TSH 3RD GENERATION   Result Value Ref Range    TSH 3.81 (H) 0.36 - 3.74 uIU/mL     Patient Active Problem List   Diagnosis Code    Diabetes mellitus on insulin pump Dr. Almita Jasso E11.9    Osteopenia M85.80    Hyperlipidemia E78.5    Primary hypertension I10    Proliferative diabetic retinopathy associated with type 2 diabetes mellitus ([facility] [address] E11.3599    Collagenous colitis K52.831     Assessment and plan:  1. Diabetes. Doing well, f/u NP at [facility], f/u Dr. Patrick Pacheco  2. Hypertension. Continue current  3. Hyperlipidemia. Continue Zocor   4. Osteopenia. Continue Evista, calcium plus D., weight bearing exercise. DEXA being followed at [facility]        RTC 7/21    Above conditions discussed at length and patient vocalized understanding.   All questions answered to patient satisfaction

## 2020-07-06 NOTE — PROGRESS NOTES
This is a Subsequent Medicare Annual Wellness Visit      I have reviewed the patient's medical history in detail and updated the computerized patient record. 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 [address]     [facility]; on insulin pump    FHx: breast cancer     Hemorrhoids     Hyperlipidemia     Hypertension     Lumbar spinal stenosis 03/2019    mri     Macular degeneration     and retinopathy Dr Bekah Tinajero t score -1.2 spine, -2.1 hip (11/10) improved from 2007/2003;  1.7 spine, -2.1 hip (2/16); 2.8 spine, -2.2 hip (5/18) [facility]      Past Surgical History:   Procedure Laterality Date    COLONOSCOPY N/A 2/4/2020    Dr. Rodríguez Verma 2005 neg; Dr Brenda Cutler 9/15 neg; Dr Kehinde Real 2/20 collagenous colitis    [place]      Dr Kim Sanz 2015    HX ORTHOPAEDIC      DEXA 1.7 spine, -2.1 hip 2/16 [facility]    US ABD COMP  8/02    negative     Current Outpatient Medications   Medication Sig Dispense Refill    calcium-cholecalciferol, d3, (CALCIUM 600 + D) 600-125 mg-unit tab Take  by mouth.  naproxen-diphenhydramine (ALEVE PM) 220-25 mg tab Take  by mouth nightly.  melatonin 1 mg tablet Take  by mouth nightly.  multivit-min-FA-lycopen-lutein (CENTRUM SILVER) 0.4-300-250 mg-mcg-mcg tab Take  by mouth.  ALPRAZolam (XANAX) 0.5 mg tablet Take 1 Tab by mouth three (3) times daily as needed for Anxiety. Max Daily Amount: 1.5 mg. 20 Tab 1    coenzyme q10 (CO Q-10) 10 mg cap Take  by mouth.  insulin lispro (HUMALOG) 100 unit/mL injection by Continuous Infusion route. On insulin pump managed by DI      evening primrose oil 500 mg cap Take 500 mg by mouth nightly.  raloxifene (EVISTA) 60 mg tablet Take  by mouth daily.         simvastatin (ZOCOR) 20 mg tablet Take  by mouth nightly.  lisinopril (PRINIVIL, ZESTRIL) 10 mg tablet Take  by mouth nightly. Allergies   Allergen Reactions    Fosamax [Alendronate] Other (comments)     GI upset     Family History   Problem Relation Age of Onset    Cancer Mother         breast    Breast Cancer Mother      Social History     Tobacco Use    Smoking status: Current Some Day Smoker     Packs/day: 0.50     Last attempt to quit: 1995     Years since quittin.9    Smokeless tobacco: Never Used   Substance Use Topics    Alcohol use: Yes     Alcohol/week: 0.8 standard drinks     Types: 1 Glasses of wine per week     Comment: nightly     Depression Risk Factor Screening:     3 most recent PHQ Screens 4/15/2019   Little interest or pleasure in doing things Not at all   Feeling down, depressed, irritable, or hopeless Not at all   Total Score PHQ 2 0     SCREENINGS  Colonoscopy last done  Dr Eladio Harvey last done 10/19  DEXA last done     Immunization History   Administered Date(s) Administered    (RETIRED) Pneumococcal Vaccine (Unspecified Type) 1999, 2008    Influenza High Dose Vaccine PF 10/09/2015, 2017, 2018, 2019    Influenza Vaccine 2013    Influenza Vaccine (Tri) Adjuvanted 2018    Influenza Vaccine Whole 2011, 10/03/2012    Pneumococcal Conjugate (PCV-13) 07/10/2015    TDAP Vaccine 2012    Zoster 2008     Alcohol Risk Factor Screening: On any occasion during the past 3 months, have you had more than 3 drinks containing alcohol? No    Do you average more than 7 drinks per week? No      Functional Ability and Level of Safety:     Hearing Loss   normal-to-mild    Vision - no acute complaints and is followed by Dr Rockey Primrose and Dr Ivy Estrada of [facility]   Self-care. Requires assistance with: no ADLs    Fall Risk     Fall Risk Assessment, last 12 mths 4/15/2019   Able to walk? Yes   Fall in past 12 months?  No Abuse Screen   Patient is not abused    Review of Systems   A comprehensive review of systems was negative except for that written in the HPI. Physical Examination     Evaluation of Cognitive Function:  Mood/affect:  neutral  Appearance: age appropriate, well dressed and within normal Limits  Family member/caregiver input: na    Patient Care Team:  Magdi Wells MD as PCP - General (Internal Medicine)  Magdi Wells MD as PCP - [facility] Empaneled Provider  Italia Aguila    Advice/Referrals/Counseling   Education and counseling provided:  Are appropriate based on today's review and evaluation  End-of-Life planning (with patient's consent)  Pneumococcal Vaccine  Influenza Vaccine  Screening Mammography  Colorectal cancer screening tests  Cardiovascular screening blood test  Bone mass measurement (DEXA)  Diabetes screening test    Assessment/Plan       ICD-10-CM ICD-9-CM    1. Medicare annual wellness visit, subsequent  Z00.00 V70.0    2. Primary hypertension  I10 401.9    3. Stable proliferative diabetic retinopathy associated with type 2 diabetes mellitus, unspecified laterality ([facility] [address]  E11.3559 250.50      362.02    4. Diabetes mellitus due to underlying condition with diabetic nephropathy, without long-term current use of insulin (HCC)  E08.21 249.40      583.81    5. Osteopenia, unspecified location  M85.80 733.90    6. Hyperlipidemia, unspecified hyperlipidemia type  E78.5 272.4    7. Collagenous colitis  K52.831 558.9    8. Advanced directives, counseling/discussion  Z71.89 V65.49 ADVANCE CARE PLANNING FIRST 30 MINS   9. Screening for alcoholism  Z13.39 V79.1 [place] ANNUAL ALCOHOL SCREEN 15 MIN   10. Screening for depression  Z13.31 V79.0 DEPRESSION SCREEN ANNUAL     current treatment plan is effective, no change in therapy  lab results and schedule of future lab studies reviewed with patient. End of life discussion undertaken.   Will get copy of living will  Flu high dose when in season  Colonoscopy per Dr Hassan Haspeter to be scheduled 2020  DEXA per Tisha  Flu hd yearly  shingrix advocated      Cognitive Screening   Has your family/caregiver stated any concerns about your memory: no

## 2020-07-09 ENCOUNTER — OFFICE VISIT (OUTPATIENT)
Dept: INTERNAL MEDICINE CLINIC | Age: 75
End: 2020-07-09

## 2020-07-09 VITALS
WEIGHT: 147 LBS | HEART RATE: 82 BPM | TEMPERATURE: 96.8 F | DIASTOLIC BLOOD PRESSURE: 61 MMHG | RESPIRATION RATE: 14 BRPM | HEIGHT: 61 IN | SYSTOLIC BLOOD PRESSURE: 138 MMHG | OXYGEN SATURATION: 95 % | BODY MASS INDEX: 27.75 KG/M2

## 2020-07-09 DIAGNOSIS — Z13.31 SCREENING FOR DEPRESSION: ICD-10-CM

## 2020-07-09 DIAGNOSIS — M85.80 OSTEOPENIA, UNSPECIFIED LOCATION: ICD-10-CM

## 2020-07-09 DIAGNOSIS — E78.5 HYPERLIPIDEMIA, UNSPECIFIED HYPERLIPIDEMIA TYPE: ICD-10-CM

## 2020-07-09 DIAGNOSIS — Z71.89 ADVANCED DIRECTIVES, COUNSELING/DISCUSSION: ICD-10-CM

## 2020-07-09 DIAGNOSIS — Z00.00 MEDICARE ANNUAL WELLNESS VISIT, SUBSEQUENT: Primary | ICD-10-CM

## 2020-07-09 DIAGNOSIS — Z13.39 SCREENING FOR ALCOHOLISM: ICD-10-CM

## 2020-07-09 DIAGNOSIS — E08.21 DIABETES MELLITUS DUE TO UNDERLYING CONDITION WITH DIABETIC NEPHROPATHY, WITHOUT LONG-TERM CURRENT USE OF INSULIN (HCC): ICD-10-CM

## 2020-07-09 DIAGNOSIS — K52.831 COLLAGENOUS COLITIS: ICD-10-CM

## 2020-07-09 DIAGNOSIS — I10 PRIMARY HYPERTENSION: ICD-10-CM

## 2020-07-09 DIAGNOSIS — E11.3559 STABLE PROLIFERATIVE DIABETIC RETINOPATHY ASSOCIATED WITH TYPE 2 DIABETES MELLITUS, UNSPECIFIED LATERALITY (HCC): ICD-10-CM

## 2020-07-09 RX ORDER — BUDESONIDE 3 MG/1
CAPSULE, COATED PELLETS ORAL 3 TIMES DAILY
COMMUNITY
End: 2021-08-19

## 2020-07-09 NOTE — ACP (ADVANCE CARE PLANNING)
Advance Care Planning       Advance Care Planning (ACP) Physician/NP/PA (Provider) Conversation        Date of ACP Conversation: 7/9/2020    Conversation Conducted with:   Patient with Decision Making Nia Rojas Maker:    Current Designated Health Care Decision Maker:   (If there is a valid [address] named in the [address]" box in the ACP activity, but it is not visible above, be sure to open that field and then select the health care decision maker relationship (ie \"primary\") in the blank space to the right of the name.)    Note: Assess and validate information in current ACP documents, as indicated. If no Authorized Decision Maker has previously been identified, then patient chooses [address]:  \"Who would you like to name as your primary health care decision-maker? \"    Name: [facility]   Relationship:  Phone number:   Cristopher Schlatter this person be reached easily? \" YES  \"Who would you like to name as your back-up decision maker? \"   Name:   Relationship:  Phone number:   \"Can this person be reached easily? \" NO    Note: If the relationship of these Decision-Makers to the patient does NOT follow your state's Next of Kin hierarchy, recommend that patient complete ACP document that meets state-specific requirements to allow them to act on the patient's behalf when appropriate. Care Preferences:    Hospitalization: \"If your health worsens and it becomes clear that your chance of recovery is unlikely, what would your preference be regarding hospitalization? \"  If the patient would want hospitalization, answer \"yes\". If the patient would prefer comfort-focused treatment without hospitalization, answer \"no\". yes      Ventilation:   \"If you were in your present state of health and suddenly became very ill and were unable to breathe on your own, what would your preference be about the use of a ventilator (breathing machine) if it was available to you?\"    If patient would desire the use of a ventilator (breathing machine), answer \"yes\", if not answer \"no\":yes    \"If your health worsens and it becomes clear that your chance of recovery is unlikely, what would your preference be about the use of a ventilator (breathing machine) if it was available to you? \"   yes      Resuscitation:  \"CPR works best to restart the heart when there is a sudden event, like a heart attack, in someone who is otherwise healthy. Unfortunately, CPR does not typically restart the heart for people who have serious health conditions or who are very sick. \"    \"In the event your heart stopped as a result of an underlying serious health condition, would you want attempts to be made to restart your heart (answer \"yes\" for attempt to resuscitate) or would you prefer a natural death (answer \"no\" for do not attempt to resuscitate)? \"   yes    NOTE: If the patient has a valid advance directive AND provides care preference(s) that are inconsistent with that prior directive, advise the patient to consider either: creating a new advance directive that complies with state-specific requirements; or, if that is not possible, orally revoking that prior directive in accordance with state-specific requirements, which must be documented in the EHR.     Conversation Outcomes / Follow-Up Plan:   Recommended completion of Advance Directive   She will try to bring in copy of amd from home      Length of Voluntary ACP Conversation in minutes:  16 minutes      Jack Travis MD

## 2020-07-09 NOTE — PATIENT INSTRUCTIONS

## 2020-10-08 ENCOUNTER — TRANSCRIBE ORDER (OUTPATIENT)
Dept: SCHEDULING | Age: 75
End: 2020-10-08

## 2020-10-08 DIAGNOSIS — Z12.31 VISIT FOR SCREENING MAMMOGRAM: Primary | ICD-10-CM

## 2020-11-05 NOTE — ED TRIAGE NOTES
Patient assessment and vitals complete and stable. Patient weaned down to room air this shift, tolerating well. Patient is blind and non verbal however is able to follow commands with assist. Per night shift, patient did not void for them, bladder scan showed 999 mL of urine in her bladder. Patient assisted to bed side commode, voided 990 mL without difficulty, patient also voide 300 mL this shift without difficulty. Patient to go down for ultrasound, one time dose of ativan to be given to patient prior to ultrasound. Will pass on to night shift.  Will continue to monitor Pt states she was bitten by a tick on her lower abd yest. Today c/o itching.  States area is red and purple

## 2021-07-06 ENCOUNTER — TELEPHONE (OUTPATIENT)
Dept: INTERNAL MEDICINE CLINIC | Age: 76
End: 2021-07-06

## 2021-07-06 DIAGNOSIS — E78.5 HYPERLIPIDEMIA, UNSPECIFIED HYPERLIPIDEMIA TYPE: Primary | ICD-10-CM

## 2021-07-06 DIAGNOSIS — I10 PRIMARY HYPERTENSION: ICD-10-CM

## 2021-08-12 ENCOUNTER — APPOINTMENT (OUTPATIENT)
Dept: INTERNAL MEDICINE CLINIC | Age: 76
End: 2021-08-12

## 2021-08-12 ENCOUNTER — HOSPITAL ENCOUNTER (OUTPATIENT)
Dept: LAB | Age: 76
Discharge: HOME | End: 2021-08-12
Payer: MEDICARE

## 2021-08-12 DIAGNOSIS — I10 PRIMARY HYPERTENSION: ICD-10-CM

## 2021-08-12 DIAGNOSIS — E78.5 HYPERLIPIDEMIA, UNSPECIFIED HYPERLIPIDEMIA TYPE: ICD-10-CM

## 2021-08-12 LAB
ALBUMIN SERPL-MCNC: 3.4 G/DL (ref 3.4–5)
ALBUMIN/GLOB SERPL: 1 {RATIO} (ref 0.8–1.7)
ALP SERPL-CCNC: 80 U/L (ref 45–117)
ALT SERPL-CCNC: 21 U/L (ref 13–56)
ANION GAP SERPL CALC-SCNC: 5 MMOL/L (ref 3–18)
AST SERPL-CCNC: 15 U/L (ref 10–38)
BILIRUB SERPL-MCNC: 0.4 MG/DL (ref 0.2–1)
BUN SERPL-MCNC: 16 MG/DL (ref 7–18)
BUN/CREAT SERPL: 22 (ref 12–20)
CALCIUM SERPL-MCNC: 8.6 MG/DL (ref 8.5–10.1)
CHLORIDE SERPL-SCNC: 109 MMOL/L (ref 100–111)
CHOLEST SERPL-MCNC: 145 MG/DL
CO2 SERPL-SCNC: 27 MMOL/L (ref 21–32)
CREAT SERPL-MCNC: 0.74 MG/DL (ref 0.6–1.3)
CREAT UR-MCNC: 72 MG/DL (ref 30–125)
ERYTHROCYTE [DISTWIDTH] IN BLOOD BY AUTOMATED COUNT: 13.2 % (ref 11.6–14.5)
EST. AVERAGE GLUCOSE BLD GHB EST-MCNC: 157 MG/DL
GLOBULIN SER CALC-MCNC: 3.3 G/DL (ref 2–4)
GLUCOSE SERPL-MCNC: 139 MG/DL (ref 74–99)
HBA1C MFR BLD: 7.1 % (ref 4.2–5.6)
HCT VFR BLD AUTO: 40.3 % (ref 35–45)
HDLC SERPL-MCNC: 85 MG/DL (ref 40–60)
HDLC SERPL: 1.7 {RATIO} (ref 0–5)
HGB BLD-MCNC: 13.2 G/DL (ref 12–16)
LDLC SERPL CALC-MCNC: 52 MG/DL (ref 0–100)
LIPID PROFILE,FLP: ABNORMAL
MCH RBC QN AUTO: 32.8 PG (ref 24–34)
MCHC RBC AUTO-ENTMCNC: 32.8 G/DL (ref 31–37)
MCV RBC AUTO: 100.2 FL (ref 74–97)
MICROALBUMIN UR-MCNC: 0.66 MG/DL (ref 0–3)
MICROALBUMIN/CREAT UR-RTO: 9 MG/G (ref 0–30)
PLATELET # BLD AUTO: 271 K/UL (ref 135–420)
PMV BLD AUTO: 9.9 FL (ref 9.2–11.8)
POTASSIUM SERPL-SCNC: 4.6 MMOL/L (ref 3.5–5.5)
PROT SERPL-MCNC: 6.7 G/DL (ref 6.4–8.2)
RBC # BLD AUTO: 4.02 M/UL (ref 4.2–5.3)
SODIUM SERPL-SCNC: 141 MMOL/L (ref 136–145)
TRIGL SERPL-MCNC: 40 MG/DL (ref ?–150)
VLDLC SERPL CALC-MCNC: 8 MG/DL
WBC # BLD AUTO: 5.7 K/UL (ref 4.6–13.2)

## 2021-08-12 PROCEDURE — 36415 COLL VENOUS BLD VENIPUNCTURE: CPT

## 2021-08-12 PROCEDURE — 83036 HEMOGLOBIN GLYCOSYLATED A1C: CPT

## 2021-08-12 PROCEDURE — 80053 COMPREHEN METABOLIC PANEL: CPT

## 2021-08-12 PROCEDURE — 80061 LIPID PANEL: CPT

## 2021-08-12 PROCEDURE — 82043 UR ALBUMIN QUANTITATIVE: CPT

## 2021-08-12 PROCEDURE — 85027 COMPLETE CBC AUTOMATED: CPT

## 2021-08-15 NOTE — PROGRESS NOTES
67 y.o. white female who presents for evaluation. She continues to see the NP at the [facility]. No polyuria, polydipsia, nocturia. No neuropathy symptoms. No new developments on the insulin pump    Denies any cardiovascular complaints. She tries to take the dog for a 15 min walk a couple times a week    Denies GI or  complaints. She was dx'ed w collagenous colitis by Dr Oscar Ennis but is now off the entecort    She is known to have lumbar spinal stenosis and apparently had a flare a couple months back. She had severe back pain with radiation into the left leg but got better over the last few weeks.   Still, she is interested in seeing a specialist for opinion     LAST MEDICARE WELLNESS EXAM: 7/10/14, 7/10/15, 3/16/17, 3/22/18, 4/15/19, 7/9/20, 8/19/21    Past Medical History:   Diagnosis Date    Collagenous colitis 07/09/2020    Dr Italia Santacruz     Degenerative arthritis of lumbar spine 04/2019    on mri multilevel foraminal and central stenosis    Diabetes mellitus ([facility] [address]     [facility]; on insulin pump    FHx: breast cancer     Hemorrhoids     Hyperlipidemia     Hypertension     Macular degeneration     and retinopathy Dr Fredrick Sands of both knees     Dr Sauld Vu t score -1.2 spine, -2.1 hip (11/10) improved from 2007/2003;  1.7 spine, -2.1 hip (2/16); 2.8 spine, -2.2 hip (5/18) Johnye Niece     Past Surgical History:   Procedure Laterality Date    COLONOSCOPY N/A 2/4/2020    Dr. Tori Greene 2005 neg; Dr Margarita Garner 9/15 neg; Dr Italia Santacruz 2/20 collagenous colitis    HX CATARACT REMOVAL Bilateral 02/2001    Dr Rika Newsome 1.7 spine, -2.1 hip 2/16 [place]    US ABD COMP  8/02    negative     Social History     Socioeconomic History    Marital status:      Spouse name: Not on file    Number of children: 2    Years of education: Not on file    Highest education level: Not on file   Occupational History    Occupation: ret  now part time [employer]   Tobacco Use    Smoking status: Current Some Day Smoker     Packs/day: 0.50     Last attempt to quit: 1995     Years since quittin.0    Smokeless tobacco: Never Used   Substance and Sexual Activity    Alcohol use: Yes     Alcohol/week: 0.8 standard drinks     Types: 1 Glasses of wine per week     Comment: nightly    Drug use: No    Sexual activity: Not on file   Other Topics Concern    Not on file   Social History Narrative    Not on file     Social Determinants of Health     Financial Resource Strain:     Difficulty of Paying Living Expenses:    Food Insecurity:     Worried About Running Out of Food in the Last Year:     [address] in the Last Year:    Transportation Needs:     Lack of Transportation (Medical):  Lack of Transportation (Non-Medical):    Physical Activity:     Days of Exercise per Week:     Minutes of Exercise per Session:    Stress:     Feeling of Stress :    Social Connections:     Frequency of Communication with Friends and Family:     Frequency of Social Gatherings with Friends and Family:     Attends Confucianism Services:     Active Member of Clubs or Organizations:     Attends Club or Organization Meetings:     Marital Status:    Intimate Partner Violence:     Fear of Current or Ex-Partner:     Emotionally Abused:     Physically Abused:     Sexually Abused:      Current Outpatient Medications   Medication Sig    levothyroxine (SYNTHROID) 50 mcg tablet Take 50 mcg by mouth daily.  calcium-cholecalciferol, d3, (CALCIUM 600 + D) 600-125 mg-unit tab Take  by mouth.  naproxen-diphenhydramine (ALEVE PM) 220-25 mg tab Take  by mouth nightly. PRN    melatonin 1 mg tablet Take  by mouth nightly.  multivit-min-FA-lycopen-lutein (CENTRUM SILVER) 0.4-300-250 mg-mcg-mcg tab Take  by mouth.  insulin lispro (HUMALOG) 100 unit/mL injection by Continuous Infusion route.  On insulin pump managed by DI    evening primrose oil 500 mg cap Take 500 mg by mouth nightly.  raloxifene (EVISTA) 60 mg tablet Take  by mouth daily.  simvastatin (ZOCOR) 20 mg tablet Take  by mouth nightly.  lisinopril (PRINIVIL, ZESTRIL) 10 mg tablet Take  by mouth nightly. No current facility-administered medications for this visit. Allergies   Allergen Reactions    Fosamax [Alendronate] Other (comments)     GI upset     REVIEW OF SYSTEMS:  gyn Dr Segundo Hurt 2012, mammo 12/20, sees Dr Prasanth Peters, last DEXA 2/16, colo 2/20 Dr Manish Pearl  Ophtho - no vision change or eye pain  Oral - no mouth pain, tongue or tooth problems  Ears - no hearing loss, ear pain, fullness, no swallowing problems  Cardiac - no CP, PND, orthopnea, edema, palpitations or syncope  Chest - no breast masses  Resp - no wheezing, chronic coughing, dyspnea  GI - no heartburn, nausea, vomiting, change in bowel habits, bleeding, hemorrhoids  Urinary - no dysuria, hematuria, flank pain, urgency, frequency  Ortho - no swelling, dec ROM, myalgias    Visit Vitals  /84   Pulse 84   Temp 97.7 °F (36.5 °C) (Temporal)   Resp 12   Ht 5' 1\" (1.549 m)   Wt 150 lb (68 kg)   SpO2 97%   BMI 28.34 kg/m²      Affect is appropriate. Mood stable  No apparent distress  HEENT --Anicteric sclerae, tympanic membranes normal,  ear canals normal.  No thyromegaly, JVD, or bruits. PERRL, EOMI, conjunctiva and lids normal.    Lungs --Clear to auscultation and percussion, normal percussion. Heart --Regular rate and rhythm, no murmurs, rubs, gallops, or clicks. Chest wall --Nontender to palpation. PMI normal.  Abdomen -- Soft and nontender, no hepatosplenomegaly or masses. Extremities -- Without cyanosis, clubbing, edema. 2+ pulses equally and bilaterally.     LABS   From 5/12 showed   gluc 155, cr 0.69, gfr 90,  alt 12,          chol 149, tg 75, hdl 76, ldl-c 58,      hb 13.2, plt 290, sadia neg  From 11/13 showed gluc 134, cr 0.69, gfr>60,    hba1c 7.3,      wbc 7.9, hb 11.1, plt 235,            ck/trop neg  From 1/14 showed                     umar 9  From 4/14 showed        hba1c 7.0, chol 162, tg 73, hdl 98, ldl-c 50  From 7/15 showed   gluc 101, cr 0.76, gfr>60, alt<5,  hba1c 8.0, chol 152, tg 49, hdl 81, ldl-c 61,  wbc 5.3, hb 13.6, plt 245, umar 4.2  From 3/17 showed   gluc 90,   cr 0.80, gfr>60, alt 20, hba1c 8.2, chol 156, tg 58, hdl 97, ldl-c 47,  wbc 6.1, hb 13.5, plt 279, umar 7.0  From 3/18 showed   gluc 84,   cr 0.68, gfr>60, alt 20, hba1c 7.9, chol 135, tg 52, hdl 90, ldl-c 35,  wbc 4.5, hb 14.7, plt 292, umar 11  From 4/19 showed   gluc 90,   cr 0.80, gfr>60, alt 20, hba1c 8.0, chol 154, tg 61, hdl 86, ldl-c 56,  wbc 6.0, hb 14.1, plt 287, umar 16  From 6/20 showed   gluc 169, cr 0.85, gfr>60, alt 21, hba1c 7.4, chol 152, tg 47, hdl 91, ldl-c 52,  wbc 6.8, hb 13.9, plt 280, umar 9,    tsh 3.81    Results for orders placed or performed during the hospital encounter of 08/12/21   CBC W/O DIFF   Result Value Ref Range    WBC 5.7 4.6 - 13.2 K/uL    RBC 4.02 (L) 4.20 - 5.30 M/uL    HGB 13.2 12.0 - 16.0 g/dL    HCT 40.3 35.0 - 45.0 %    .2 (H) 74.0 - 97.0 FL    MCH 32.8 24.0 - 34.0 PG    MCHC 32.8 31.0 - 37.0 g/dL    RDW 13.2 11.6 - 14.5 %    PLATELET 052 542 - 072 K/uL    MPV 9.9 9.2 - 06.1 FL   METABOLIC PANEL, COMPREHENSIVE   Result Value Ref Range    Sodium 141 136 - 145 mmol/L    Potassium 4.6 3.5 - 5.5 mmol/L    Chloride 109 100 - 111 mmol/L    CO2 27 21 - 32 mmol/L    Anion gap 5 3.0 - 18 mmol/L    Glucose 139 (H) 74 - 99 mg/dL    BUN 16 7.0 - 18 MG/DL    Creatinine 0.74 0.6 - 1.3 MG/DL    BUN/Creatinine ratio 22 (H) 12 - 20      GFR est AA >60 >60 ml/min/1.73m2    GFR est non-AA >60 >60 ml/min/1.73m2    Calcium 8.6 8.5 - 10.1 MG/DL    Bilirubin, total 0.4 0.2 - 1.0 MG/DL    ALT (SGPT) 21 13 - 56 U/L    AST (SGOT) 15 10 - 38 U/L    Alk.  phosphatase 80 45 - 117 U/L    Protein, total 6.7 6.4 - 8.2 g/dL    Albumin 3.4 3.4 - 5.0 g/dL    Globulin 3.3 2.0 - 4.0 g/dL    A-G Ratio 1.0 0.8 - 1.7 MICROALBUMIN, UR, RAND W/ MICROALB/CREAT RATIO   Result Value Ref Range    Microalbumin,urine random 0.66 0 - 3.0 MG/DL    Creatinine, urine 72.00 30 - 125 mg/dL    Microalbumin/Creat ratio (mg/g creat) 9 0 - 30 mg/g   LIPID PANEL   Result Value Ref Range    LIPID PROFILE          Cholesterol, total 145 <200 MG/DL    Triglyceride 40 <150 MG/DL    HDL Cholesterol 85 (H) 40 - 60 MG/DL    LDL, calculated 52 0 - 100 MG/DL    VLDL, calculated 8 MG/DL    CHOL/HDL Ratio 1.7 0 - 5.0     HEMOGLOBIN A1C WITH EAG   Result Value Ref Range    Hemoglobin A1c 7.1 (H) 4.2 - 5.6 %    Est. average glucose 157 mg/dL     We reviewed the patient's labs from the last several visits to point out trends in the numbers            Patient Active Problem List   Diagnosis Code    Diabetes mellitus on insulin pump Dr. Cady Lemus E11.9    Osteopenia M85.80    Hyperlipidemia E78.5    Primary hypertension I10    Proliferative diabetic retinopathy associated with type 2 diabetes mellitus ([facility] E11.3599    Collagenous colitis K52.831     Assessment and plan:  1. Diabetes. Doing well on insulin pump, f/u NP at [facility], f/u Dr. Enrique Cardoso  2. Hypertension. Continue yusuf and controlled  3. Hyperlipidemia. Continue Zocor and at target  4. Osteopenia. Continue Evista, calcium plus D., weight bearing exercise. DEXA being followed at [facility]  5. Spine. Will refer to Dr Erik Silva      [facility] 8/22    Above conditions discussed at length and patient vocalized understanding. All questions answered to patient satisfaction          ICD-10-CM ICD-9-CM    1. Medicare annual wellness visit, subsequent  Z00.00 V70.0    2. Diabetes mellitus due to underlying condition with diabetic nephropathy, without long-term current use of insulin ([facility])  E08.21 249.40 MICROALBUMIN, UR, RAND W/ MICROALB/CREAT RATIO     583.81 HEMOGLOBIN A1C WITH EAG   3.  Stable proliferative diabetic retinopathy associated with type 2 diabetes mellitus, unspecified laterality ([facility]  E11.3559 250.50 362.02    4. Chronic bilateral low back pain with left-sided sciatica  M54.42 724.2 REFERRAL TO [facility] MEDICINE REHAB    G89.29 724.3      338.29    5. Primary hypertension  I10 401.9 CBC W/O DIFF      METABOLIC PANEL, COMPREHENSIVE      LIPID PANEL   6. Collagenous colitis  K52.831 558.9    7. Hyperlipidemia, unspecified hyperlipidemia type  E78.5 272.4    8. Spinal stenosis of lumbar region without neurogenic claudication  M48.061 724.02    9.  Advanced directives, counseling/discussion  Z71.89 V65.49 ADVANCE CARE PLANNING FIRST 30 MINS

## 2021-08-15 NOTE — PROGRESS NOTES
This is a Subsequent Medicare Annual Wellness Visit      I have reviewed the patient's medical history in detail and updated the computerized patient record. Assessment/Plan   Education and counseling provided:  Are appropriate based on today's review and evaluation  End-of-Life planning (with patient's consent)  Influenza Vaccine  Screening Mammography  Cardiovascular screening blood test  Bone mass measurement (DEXA)  Diabetes screening test    1. Medicare annual wellness visit, subsequent  2. Diabetes mellitus due to underlying condition with diabetic nephropathy, without long-term current use of insulin (HCC)  -     MICROALBUMIN, UR, RAND W/ MICROALB/CREAT RATIO; Future  -     HEMOGLOBIN A1C WITH EAG; Future  3. Stable proliferative diabetic retinopathy associated with type 2 diabetes mellitus, unspecified laterality ([facility] [address]  4. Chronic bilateral low back pain with left-sided sciatica  -     REFERRAL TO PHYSICIAL MEDICINE REHAB  5. Primary hypertension  -     CBC W/O DIFF; Future  -     METABOLIC PANEL, COMPREHENSIVE; Future  -     LIPID PANEL; Future  6. Collagenous colitis  7. Hyperlipidemia, unspecified hyperlipidemia type  8. Spinal stenosis of lumbar region without neurogenic claudication  9. Advanced directives, counseling/discussion  -     ADVANCE CARE PLANNING FIRST 30 MINS       Depression Risk Factor Screening     3 most recent PHQ Screens 8/19/2021   Little interest or pleasure in doing things Not at all   Feeling down, depressed, irritable, or hopeless Not at all   Total Score PHQ 2 0       Alcohol Risk Screen    Do you average more than 1 drink per night or more than 7 drinks a week:  No    On any one occasion in the past three months have you have had more than 3 drinks containing alcohol:  No        Functional Ability and Level of Safety    Hearing: Hearing is good. Activities of Daily Living: The home contains: no safety equipment.   Patient does total self care      Ambulation: with no difficulty     Fall Risk:  Fall Risk Assessment, last 12 mths 8/19/2021   Able to walk? Yes   Fall in past 12 months? 1   Do you feel unsteady? 0   Are you worried about falling 0   Is TUG test greater than 12 seconds? 0   Is the gait abnormal? 0   Number of falls in past 12 months 1   Fall with injury?  0      Abuse Screen:  Patient is not abused       Cognitive Screening    Has your family/caregiver stated any concerns about your memory: no     Cognitive Screening: Normal - Mini Cog Test    Health Maintenance Due     Health Maintenance Due   Topic Date Due    Foot Exam Q1  03/22/2019    Eye Exam Retinal or Dilated  07/23/2021       Patient Care Team   Patient Care Team:  Je Ruiz MD as PCP - General (Internal Medicine)  Je Ruiz MD as PCP - REHABILITATION [facility] Empaneled Provider    History     Patient Active Problem List   Diagnosis Code    Diabetes mellitus on insulin pump Dr. Merleen Carrel E11.9    Osteopenia M85.80    Hyperlipidemia E78.5    Primary hypertension I10    Proliferative diabetic retinopathy associated with type 2 diabetes mellitus ([facility] [address] E11.3599    Collagenous colitis K52.831     Past Medical History:   Diagnosis Date    Collagenous colitis 07/09/2020    Dr Subhash Samuels     Degenerative arthritis of lumbar spine 04/2019    on mri multilevel foraminal and central stenosis    Diabetes mellitus ([facility] [address]     [facility]; on insulin pump    FHx: breast cancer     Hemorrhoids     Hyperlipidemia     Hypertension     Macular degeneration     and retinopathy Dr Callie Gavin of both knees     Dr Mary Ellen Barksdale t score -1.2 spine, -2.1 hip (11/10) improved from 2007/2003;  1.7 spine, -2.1 hip (2/16); 2.8 spine, -2.2 hip (5/18) Mitesh Shirley      Past Surgical History:   Procedure Laterality Date    COLONOSCOPY N/A 2/4/2020    Dr. Diann Fonseca 2005 neg; Dr Vero Ross 9/15 neg; Dr Subhash Samuels 2/20 collagenous colitis    [place] 02[place]2001     [place]    HX ORTHOPAEDIC      DEXA 1.7 spine, -2.1 hip  [facility]    US ABD COMP      negative     Current Outpatient Medications   Medication Sig Dispense Refill    levothyroxine (SYNTHROID) 50 mcg tablet Take 50 mcg by mouth daily.  calcium-cholecalciferol, d3, (CALCIUM 600 + D) 600-125 mg-unit tab Take  by mouth.  naproxen-diphenhydramine (ALEVE PM) 220-25 mg tab Take  by mouth nightly. PRN      melatonin 1 mg tablet Take  by mouth nightly.  multivit-min-FA-lycopen-lutein (CENTRUM SILVER) 0.4-300-250 mg-mcg-mcg tab Take  by mouth.  insulin lispro (HUMALOG) 100 unit/mL injection by Continuous Infusion route. On insulin pump managed by DI      evening primrose oil 500 mg cap Take 500 mg by mouth nightly.  raloxifene (EVISTA) 60 mg tablet Take  by mouth daily.  simvastatin (ZOCOR) 20 mg tablet Take  by mouth nightly.  lisinopril (PRINIVIL, ZESTRIL) 10 mg tablet Take  by mouth nightly. Allergies   Allergen Reactions    Fosamax [Alendronate] Other (comments)     GI upset       Family History   Problem Relation Age of Onset    Cancer Mother         breast    Breast Cancer Mother      Social History     Tobacco Use    Smoking status: Current Some Day Smoker     Packs/day: 0.50     Last attempt to quit: 1995     Years since quittin.0    Smokeless tobacco: Never Used   Substance Use Topics    Alcohol use:  Yes     Alcohol/week: 0.8 standard drinks     Types: 1 Glasses of wine per week     Comment: nightly         Shelby Paris MD

## 2021-08-19 ENCOUNTER — OFFICE VISIT (OUTPATIENT)
Dept: INTERNAL MEDICINE CLINIC | Age: 76
End: 2021-08-19
Payer: MEDICARE

## 2021-08-19 VITALS
DIASTOLIC BLOOD PRESSURE: 84 MMHG | TEMPERATURE: 97.7 F | SYSTOLIC BLOOD PRESSURE: 132 MMHG | RESPIRATION RATE: 12 BRPM | HEART RATE: 84 BPM | OXYGEN SATURATION: 97 % | WEIGHT: 150 LBS | HEIGHT: 61 IN | BODY MASS INDEX: 28.32 KG/M2

## 2021-08-19 DIAGNOSIS — Z71.89 ADVANCED DIRECTIVES, COUNSELING/DISCUSSION: ICD-10-CM

## 2021-08-19 DIAGNOSIS — M48.061 SPINAL STENOSIS OF LUMBAR REGION WITHOUT NEUROGENIC CLAUDICATION: ICD-10-CM

## 2021-08-19 DIAGNOSIS — K52.831 COLLAGENOUS COLITIS: ICD-10-CM

## 2021-08-19 DIAGNOSIS — E78.5 HYPERLIPIDEMIA, UNSPECIFIED HYPERLIPIDEMIA TYPE: ICD-10-CM

## 2021-08-19 DIAGNOSIS — E11.3559 STABLE PROLIFERATIVE DIABETIC RETINOPATHY ASSOCIATED WITH TYPE 2 DIABETES MELLITUS, UNSPECIFIED LATERALITY (HCC): ICD-10-CM

## 2021-08-19 DIAGNOSIS — Z00.00 MEDICARE ANNUAL WELLNESS VISIT, SUBSEQUENT: Primary | ICD-10-CM

## 2021-08-19 DIAGNOSIS — M54.42 CHRONIC BILATERAL LOW BACK PAIN WITH LEFT-SIDED SCIATICA: ICD-10-CM

## 2021-08-19 DIAGNOSIS — E08.21 DIABETES MELLITUS DUE TO UNDERLYING CONDITION WITH DIABETIC NEPHROPATHY, WITHOUT LONG-TERM CURRENT USE OF INSULIN (HCC): ICD-10-CM

## 2021-08-19 DIAGNOSIS — I10 PRIMARY HYPERTENSION: ICD-10-CM

## 2021-08-19 DIAGNOSIS — G89.29 CHRONIC BILATERAL LOW BACK PAIN WITH LEFT-SIDED SCIATICA: ICD-10-CM

## 2021-08-19 PROCEDURE — G8427 DOCREV CUR MEDS BY ELIG CLIN: HCPCS | Performed by: INTERNAL MEDICINE

## 2021-08-19 PROCEDURE — G8754 DIAS BP LESS 90: HCPCS | Performed by: INTERNAL MEDICINE

## 2021-08-19 PROCEDURE — G8536 NO DOC ELDER MAL SCRN: HCPCS | Performed by: INTERNAL MEDICINE

## 2021-08-19 PROCEDURE — G8419 CALC BMI OUT NRM PARAM NOF/U: HCPCS | Performed by: INTERNAL MEDICINE

## 2021-08-19 PROCEDURE — 99214 OFFICE O/P EST MOD 30 MIN: CPT | Performed by: INTERNAL MEDICINE

## 2021-08-19 PROCEDURE — 99497 ADVNCD CARE PLAN 30 MIN: CPT | Performed by: INTERNAL MEDICINE

## 2021-08-19 PROCEDURE — G8752 SYS BP LESS 140: HCPCS | Performed by: INTERNAL MEDICINE

## 2021-08-19 PROCEDURE — G0463 HOSPITAL OUTPT CLINIC VISIT: HCPCS | Performed by: INTERNAL MEDICINE

## 2021-08-19 PROCEDURE — G8510 SCR DEP NEG, NO PLAN REQD: HCPCS | Performed by: INTERNAL MEDICINE

## 2021-08-19 PROCEDURE — G8399 PT W/DXA RESULTS DOCUMENT: HCPCS | Performed by: INTERNAL MEDICINE

## 2021-08-19 PROCEDURE — 1090F PRES/ABSN URINE INCON ASSESS: CPT | Performed by: INTERNAL MEDICINE

## 2021-08-19 PROCEDURE — G0439 PPPS, SUBSEQ VISIT: HCPCS | Performed by: INTERNAL MEDICINE

## 2021-08-19 PROCEDURE — 1101F PT FALLS ASSESS-DOCD LE1/YR: CPT | Performed by: INTERNAL MEDICINE

## 2021-08-19 PROCEDURE — 3051F HG A1C>EQUAL 7.0%<8.0%: CPT | Performed by: INTERNAL MEDICINE

## 2021-08-19 RX ORDER — LEVOTHYROXINE SODIUM 50 UG/1
50 TABLET ORAL DAILY
COMMUNITY
Start: 2021-05-14

## 2021-08-19 NOTE — PROGRESS NOTES
Ariana Olea presents today for   Chief Complaint   Patient presents with    Annual Wellness Visit    Diabetes    Hypertension    Cholesterol Problem    Labs     8-12-21       Coordination of Care:  1. Have you been to the ER, urgent care clinic since your last visit? Hospitalized since your last visit? NO    2. Have you seen or consulted any other health care providers outside of the [address] [place] [address] since your last visit? Include any pap smears or colon screening.  YES

## 2021-08-20 NOTE — PATIENT INSTRUCTIONS

## 2021-08-20 NOTE — ACP (ADVANCE CARE PLANNING)
Advance Care Planning     Advance Care Planning (ACP) Physician/NP/PA Conversation      Date of Conversation: 8/19/2021  Conducted with: Patient with Decision Making Capacity    Healthcare Decision Maker:   No healthcare decision makers have been documented. Click here to complete [address] including selection of the Healthcare Decision Maker Relationship (ie \"Primary\")      Today we documented Decision Maker(s) consistent with Legal Next of Kin hierarchy. Care Preferences:    Hospitalization: \"If your health worsens and it becomes clear that your chance of recovery is unlikely, what would be your preference regarding hospitalization? \"  The patient would prefer hospitalization. Ventilation: \"If you were unable to breathe on your own and your chance of recovery was unlikely, what would be your preference about the use of a ventilator (breathing machine) if it was available to you? \"   The patient would desire the use of a ventilator. Resuscitation: \"In the event your heart stopped as a result of an underlying serious health condition, would you want attempts to be made to restart your heart, or would you prefer a natural death? \"   Yes, attempt to resuscitate.     Additional topics discussed: ventilation preferences, hospitalization preferences and resuscitation preferences    Conversation Outcomes / Follow-Up Plan:   ACP in process - information provided, considering goals and options  Reviewed DNR/DNI and patient elects Full Code (Attempt Resuscitation)     Length of Voluntary ACP Conversation in minutes:  16 minutes    Hitesh Reveles MD

## 2021-09-28 ENCOUNTER — OFFICE VISIT (OUTPATIENT)
Dept: ORTHOPEDIC SURGERY | Age: 76
End: 2021-09-28
Payer: MEDICARE

## 2021-09-28 VITALS
HEART RATE: 70 BPM | TEMPERATURE: 97.8 F | HEIGHT: 62 IN | WEIGHT: 150 LBS | BODY MASS INDEX: 27.6 KG/M2 | OXYGEN SATURATION: 99 %

## 2021-09-28 DIAGNOSIS — M47.816 FACET ARTHRITIS OF LUMBAR REGION: ICD-10-CM

## 2021-09-28 DIAGNOSIS — M54.16 LEFT LUMBAR RADICULITIS: ICD-10-CM

## 2021-09-28 DIAGNOSIS — M47.816 LUMBAR SPONDYLOSIS: ICD-10-CM

## 2021-09-28 DIAGNOSIS — M48.061 SPINAL STENOSIS OF LUMBAR REGION WITHOUT NEUROGENIC CLAUDICATION: Primary | ICD-10-CM

## 2021-09-28 PROCEDURE — 1101F PT FALLS ASSESS-DOCD LE1/YR: CPT | Performed by: PHYSICAL MEDICINE & REHABILITATION

## 2021-09-28 PROCEDURE — 99204 OFFICE O/P NEW MOD 45 MIN: CPT | Performed by: PHYSICAL MEDICINE & REHABILITATION

## 2021-09-28 PROCEDURE — G8399 PT W/DXA RESULTS DOCUMENT: HCPCS | Performed by: PHYSICAL MEDICINE & REHABILITATION

## 2021-09-28 PROCEDURE — G8432 DEP SCR NOT DOC, RNG: HCPCS | Performed by: PHYSICAL MEDICINE & REHABILITATION

## 2021-09-28 PROCEDURE — G8419 CALC BMI OUT NRM PARAM NOF/U: HCPCS | Performed by: PHYSICAL MEDICINE & REHABILITATION

## 2021-09-28 PROCEDURE — G8756 NO BP MEASURE DOC: HCPCS | Performed by: PHYSICAL MEDICINE & REHABILITATION

## 2021-09-28 PROCEDURE — 1090F PRES/ABSN URINE INCON ASSESS: CPT | Performed by: PHYSICAL MEDICINE & REHABILITATION

## 2021-09-28 PROCEDURE — G8427 DOCREV CUR MEDS BY ELIG CLIN: HCPCS | Performed by: PHYSICAL MEDICINE & REHABILITATION

## 2021-09-28 PROCEDURE — G8536 NO DOC ELDER MAL SCRN: HCPCS | Performed by: PHYSICAL MEDICINE & REHABILITATION

## 2021-09-28 NOTE — PATIENT INSTRUCTIONS
Lumbar Spinal Stenosis: Care Instructions  Your Care Instructions     Stenosis in the spine is a narrowing of the canal that is around the spinal cord and nerve roots in your back. It can happen as part of aging. Sometimes bone and other tissue grow into this canal and press on the nerves that branch out from the spinal cord. This can cause pain, numbness, and weakness. When it happens in the lower part of your back, it is called lumbar spinal stenosis. It can cause problems in the legs, feet, and rear end (buttocks). You may be able to get relief from the symptoms of spinal stenosis by taking pain medicine. Your doctor may suggest physical therapy and exercises to keep your spine strong and flexible. Some people try steroid shots to reduce swelling. If pain and numbness in your legs are still so bad that you cannot do your normal activities, you may need surgery. Follow-up care is a key part of your treatment and safety. Be sure to make and go to all appointments, and call your doctor if you are having problems. It's also a good idea to know your test results and keep a list of the medicines you take. How can you care for yourself at home? · Take an over-the-counter pain medicine. Nonsteroidal anti-inflammatory drugs (NSAIDs) such as ibuprofen or naproxen seem to work best. But if you can't take NSAIDs, you can try acetaminophen. Be safe with medicines. Read and follow all instructions on the label. · Do not take two or more pain medicines at the same time unless the doctor told you to. Many pain medicines have acetaminophen, which is Tylenol. Too much acetaminophen (Tylenol) can be harmful. · Stay at a healthy weight. Being overweight puts extra strain on your spine. · Change positions often when you sit or stand. This can ease pain. It may also reduce pressure on the spinal cord and its nerves. · Avoid doing things that make your symptoms worse.  Walking downhill and standing for a long time may cause pain.  · Stretch and strengthen your back muscles as your doctor or physical therapist recommends. If your doctor says it is okay to do them, these exercises may help. ? Lie on your back with your knees bent. Gently pull one bent knee to your chest. Put that foot back on the floor, and then pull the other knee to your chest.  ? Do pelvic tilts. Lie on your back with your knees bent. Tighten your stomach muscles. Pull your belly button (navel) in and up toward your ribs. You should feel like your back is pressing to the floor and your hips and pelvis are slightly lifting off the floor. Hold for 6 seconds while breathing smoothly. ? Stand with your back flat against a wall. Slowly slide down until your knees are slightly bent. Hold for 10 seconds, then slide back up the wall. · Remove or change anything in your house that may cause you to fall. Keep walkways clear of clutter, electrical cords, and throw rugs. When should you call for help? Call 911 anytime you think you may need emergency care. For example, call if:    · You are unable to move a leg at all. Call your doctor now or seek immediate medical care if:    · You have new or worse symptoms in your legs, belly, or buttocks. Symptoms may include:  ? Numbness or tingling. ? Weakness. ? Pain.     · You lose bladder or bowel control. Watch closely for changes in your health, and be sure to contact your doctor if:    · You have a fever, lose weight, or don't feel well.     · You are not getting better as expected. Where can you learn more? Go to http://www.gray.com/  Enter X327 in the search box to learn more about \"Lumbar Spinal Stenosis: Care Instructions. \"  Current as of: July 1, 2021               Content Version: 13.0  © 6115-4249 [employer]. Care instructions adapted under license by [employer] (which disclaims liability or warranty for this information).  If you have questions about a medical condition or this instruction, always ask your healthcare professional. [address] any warranty or liability for your use of this information. Back Pain, Emergency or Urgent Symptoms: Care Instructions  Your Care Instructions     Many people have back pain at one time or another. In most cases, pain gets better with self-care that includes over-the-counter pain medicine, ice, heat, and exercises. Unless you have symptoms of a severe injury or heart attack, you may be able to give yourself a few days before you call a doctor. But some back problems are very serious. Do not ignore symptoms that need to be checked right away. Follow-up care is a key part of your treatment and safety. Be sure to make and go to all appointments, and call your doctor if you are having problems. It's also a good idea to know your test results and keep a list of the medicines you take. How can you care for yourself at home? · Sit or lie in positions that are most comfortable and that reduce your pain. Try one of these positions when you lie down:  ? Lie on your back with your knees bent and supported by large pillows. ? Lie on the floor with your legs on the seat of a sofa or chair. ? Lie on your side with your knees and hips bent and a pillow between your legs. ? Lie on your stomach if it does not make pain worse. · Do not sit up in bed, and avoid soft couches and twisted positions. Bed rest can help relieve pain at first, but it delays healing. Avoid bed rest after the first day. · Change positions every 30 minutes. If you must sit for long periods of time, take breaks from sitting. Get up and walk around, or lie flat. · Try using a heating pad on a low or medium setting, for 15 to 20 minutes every 2 or 3 hours. Try a warm shower in place of one session with the heating pad. You can also buy single-use heat wraps that last up to 8 hours.  You can also try ice or cold packs on your back for 10 to 20 minutes at a time, several times a day. (Put a thin cloth between the ice pack and your skin.) This reduces pain and makes it easier to be active and exercise. · Take pain medicines exactly as directed. ? If the doctor gave you a prescription medicine for pain, take it as prescribed. ? If you are not taking a prescription pain medicine, ask your doctor if you can take an over-the-counter medicine. When should you call for help? Call 911 anytime you think you may need emergency care. For example, call if:    · You are unable to move a leg at all.     · You have back pain with severe belly pain.     · You have symptoms of a heart attack. These may include:  ? Chest pain or pressure, or a strange feeling in the chest.  ? Sweating. ? Shortness of breath. ? Nausea or vomiting. ? Pain, pressure, or a strange feeling in the back, neck, jaw, or upper belly or in one or both shoulders or arms. ? Lightheadedness or sudden weakness. ? A fast or irregular heartbeat. After you call 911, the  may tell you to chew 1 adult-strength or 2 to 4 low-dose aspirin. Wait for an ambulance. Do not try to drive yourself. Call your doctor now or seek immediate medical care if:    · You have new or worse symptoms in your arms, legs, chest, belly, or buttocks. Symptoms may include:  ? Numbness or tingling. ? Weakness. ? Pain.     · You lose bladder or bowel control.     · You have back pain and:  ? You have injured your back while lifting or doing some other activity. Call if the pain is severe, has not gone away after 1 or 2 days, and you cannot do your normal daily activities. ? You have had a back injury before that needed treatment. ? Your pain has lasted longer than 4 weeks. ? You have had weight loss you cannot explain. ? You have a fever. ? You are age 48 or older. ? You have cancer now or have had it before.    Watch closely for changes in your health, and be sure to contact your doctor if you are not getting better as expected. Where can you learn more? Go to http://www.gray.com/  Enter J739 in the search box to learn more about \"Back Pain, Emergency or Urgent Symptoms: Care Instructions. \"  Current as of: July 1, 2021               Content Version: 13.0  © 5677-3701 [employer], [employer]. Care instructions adapted under license by [employer] (which disclaims liability or warranty for this information). If you have questions about a medical condition or this instruction, always ask your healthcare professional. [address] any warranty or liability for your use of this information.

## 2021-09-28 NOTE — PROGRESS NOTES
[address]  [address], [address]  [place], [address] [place] [address] Street  Phone: [phone]  Fax: [phone]      Patient: Ariana Olea                                                                              MRN: [id number]        YOB: 1945          AGE: 67 y.o. PCP: Sho Galicia MD  Date:  09/28/21    Reason for Consultation: Back Pain      HPI:  Ariana Olea is a 67 y.o. female with relevant PMH of DM, HTN, osteoporosis who presents with low back pain with intermittent radiation down the left leg/posterior thigh. Symptoms began May 2021 without injury and she had severe 10/10 low back pain worse with turning in bed. Pain was very severe for several weeks and recently started to subside. She denies any prior back pain but did have an MRI of her lumbar spine in 2019 for further evaluation after a dexa scan. MRI demonstrates diffuse degenerative changes with moderate to severe spinal stenosis L2-3, and moderate to severe left foraminal stenosis at L4/5       Neurologic symptoms: No numbness, tingling, weakness, bowel or bladder changes. No recent falls but feels a bit off balance    Location: The pain is located in the low back   Radiation: The pain does radiate left posterior. Pain Score: Currently: 3/10  At Best: 0/10  At Worst: 10/10   Quality: Pain is of a Pulling quality. Aggravating: Pain is exacerbated by walking and standing, turning in bed  Alleviating:  The pain is alleviated by sitting    Prior Treatments:   Previous Medications:   Current Medications:Aleve  Previous work-up has included:   MRI Results (most recent):  Results from Hospital Encounter encounter on 03/27/19    MRI LUMB SPINE WO CONT    Narrative  MR Lumbar spine without contrast    HISTORY: abn finding on DEXA/xray L4-5 area  -Additional information retrieved from the electronic chart: Office note  discusses \"hyperdensity in the L1-2 region \", identified on DEXA study of May  2018    COMPARISON: No prior imaging made available    Technique: Multi-sequence multiplanar T1, T2, STIR imaging with and without fat  saturation obtained centered on the lumbar spine. FINDINGS:    Alignment: Slight left convex and rotatory lumbar scoliosis  Vertebral body height: Slight anterolisthesis of L4 and L5. Mild retrolisthesis  of L1 on L2. Marrow signal: Generally fatty marrow. Moderate discogenic endplate edema  straddling L1-2, intermixed with sclerosis. Disc spaces: Diffuse disc desiccation. Severe narrowing of L1-2, essentially  obliteration of the disc space. Mild to moderate narrowing of L2-3 and L3-4,  less so L4-5 and L5-S1. Also moderately pronounced narrowing of lower thoracic  disc spaces, with disc desiccation. No high-grade spinal stenosis at those  levels, evaluated on sagittal imaging only. Conus: Terminates at L1-2    Axial imaging correlation:    T12-L1: Mild bilobed disc osteophyte complex formation. Bilateral facet  arthropathy. Patent canal and foramina. L1-2:  Broad-based disc osteophyte complex along with facet arthropathy. Moderate spinal stenosis. Moderate to severe foraminal stenoses, with  contribution from prominent marginal spondylosis. L2-3:  Bilobed broadly based disc bulge. More pronounced hypertrophic facet  arthropathy with ligamentum flavum thickening and buckling a moderate to severe  spinal stenosis. Moderate foraminal stenoses. L3-4: Uncovering of the disc. Mild broad-based disc bulge. Hypertrophic facet  arthropathy. Moderate spinal stenosis. Moderate left foraminal stenosis. L4-5:  Uncovering of the disc severe hypertrophic facet arthropathy. Moderate  spinal stenosis. Moderate to severe left foraminal stenosis with distortion of  perineural fat, sagittal image 4. Mild to moderate right foraminal stenosis. L5-S1:  Slight uncovering of the disc bilateral facet arthropathy. Minor disc  bulge.  Small extruding synovial cyst from the right facet articulation  impressing upon the right lateral thecal sac. No significant spinal stenosis. However, there is some lateral recess stenosis with mild posterior displacement  of the crossing S1 nerves as on axial image 9. Mild to moderate foraminal  stenoses. Other structures: A few small T2 bright cystic foci of the kidneys. Impression  IMPRESSION:    1. Multilevel degenerative findings along the lumbar spine and within the lower  thoracic spine  - Most advanced degenerative disc disease is at L1-2. There is a mixture of  discogenic endplate edema and sclerosis, the latter the presumed etiology of  reported DEXA finding. No concerning features. - Up to moderate spinal stenosis, most significant at L2-3 followed by L3-4 as  delineated above  -Several levels of foraminal stenoses; includes potential for exiting left L3  and L4 nerves at their respective foramina. Thank you for enabling us to participate in the care of this patient.       Past Medical History:   Past Medical History:   Diagnosis Date    Collagenous colitis 07/09/2020    Dr Rodrigo Mcclendon Degenerative arthritis of lumbar spine 04/2019    on mri multilevel foraminal and central stenosis    Diabetes mellitus ([facility] [address]     [facility]; on insulin pump    FHx: breast cancer     Hemorrhoids     Hyperlipidemia     Hypertension     Macular degeneration     and retinopathy Dr Mendez Laming of both knees     Dr Shawn Rodriguez t score -1.2 spine, -2.1 hip (11/10) improved from 2007/2003;  1.7 spine, -2.1 hip (2/16); 2.8 spine, -2.2 hip (5/18) Diego Flores      Past Surgical History:   Past Surgical History:   Procedure Laterality Date    COLONOSCOPY N/A 2/4/2020    Dr. Meggan Spain 2005 neg; Dr Magali Vazquez 9/15 neg; Dr Katherine Lang 2/20 collagenous colitis    HX CATARACT REMOVAL Bilateral 02/2001    Dr Jing Crowell 1.7 spine, -2.1 hip 2/16 [address] COMP      negative      SocHx:   Social History     Tobacco Use    Smoking status: Current Some Day Smoker     Packs/day: 0.50     Last attempt to quit: 1995     Years since quittin.1    Smokeless tobacco: Never Used   Substance Use Topics    Alcohol use: Yes     Alcohol/week: 0.8 standard drinks     Types: 1 Glasses of wine per week     Comment: nightly      FamHx:? Family History   Problem Relation Age of Onset    Cancer Mother         breast    Breast Cancer Mother        Current Medications:    Current Outpatient Medications   Medication Sig Dispense Refill    levothyroxine (SYNTHROID) 50 mcg tablet Take 50 mcg by mouth daily.  calcium-cholecalciferol, d3, (CALCIUM 600 + D) 600-125 mg-unit tab Take  by mouth.  naproxen-diphenhydramine (ALEVE PM) 220-25 mg tab Take  by mouth nightly. PRN      melatonin 1 mg tablet Take  by mouth nightly.  multivit-min-FA-lycopen-lutein (CENTRUM SILVER) 0.4-300-250 mg-mcg-mcg tab Take  by mouth.  insulin lispro (HUMALOG) 100 unit/mL injection by Continuous Infusion route. On insulin pump managed by DI      evening primrose oil 500 mg cap Take 500 mg by mouth nightly.  raloxifene (EVISTA) 60 mg tablet Take  by mouth daily.  simvastatin (ZOCOR) 20 mg tablet Take  by mouth nightly.  lisinopril (PRINIVIL, ZESTRIL) 10 mg tablet Take  by mouth nightly. Allergies:     Allergies   Allergen Reactions    Fosamax [Alendronate] Other (comments)     GI upset        Review of Systems:   Gen:    Denied fevers, chills, malaise, fatigue, weight changes   Resp: Denied shortness of breath, cough, wheezing   CVS: Denied chest pain, palpitations   : Denied urinary urgency, frequency, incontinence   GI: Denied nausea, vomiting, constipation, diarrhea   Skin: Denied rashes, wounds   Psych: Denied anxiety, depression   Vasc: Denied claudication, ulcers   Hem: Denied easy bruising/bleeding   MSK: See HPI   Neuro: See HPI Physical Exam     Vital Signs:   Visit Vitals  Pulse 70   Temp 97.8 °F (36.6 °C) (Skin)   Ht 5' 2\" (1.575 m)   Wt 150 lb (68 kg)   SpO2 99%   BMI 27.44 kg/m²      General: ??????? Well nourished and well developed female without any acute distress   Psychiatric: ?  Alert and oriented x 3 with normal mood    HEENT: ???????? Atraumatic   Respiratory:   Breathing non-labored and non dyspneic   CV: ???????????????? Peripheral pulses intact, no peripheral edema   Skin: ????????????? No rashes       Neurologic: ?? Sensation: normal and grossly intact thebilateral, lower extremity(s) e  Strength: 5/5 in the bilateral, lower extremity(s)   Reflexes: reveals 2+ symmetric DTRs throughout LE except absent b/l achilles*   Gait: normal and difficulty with tandem gait       Musculoskeletal: Lumbar Exam     Inspection:   Alignment: Normal  Atrophy: None       Tenderness to Palpation:   Lumbar paraspinals Negative  Lumbar spinous processes Negative  SI Joint:  Negative  Gluteal:Negative   Greater trochanter: Negative  IT Band:Negative    ROM:   Lumbar ROM: Abnormal mild pain with extension, no pain with flexion  Lumbar facet loading: Positive  Hip ROM: No reproduction of pain with movement     Special Tests      Slump test: Negative  SLR: Negative  RADHA: Negative  FADIR: Negative  Log Roll: Negative             Medical Decision Making:    Images: The imaging results as well as the actual images of the studies below were reviewed and visualized. MRI lumbar spine 2019-  Diffuse facet arthropathy  Spinal stenosis most severe at L2-3, L4/5  Foraminal narrowing most severe left L4/5   Lateral recess narrowing b/l L5/S1     Assessment:   1. Lumbar spinal stenosis  2. Lumbar facet arthritis   3.  Left L4 radiculitis   Plan:      -Physical therapy -  Referral to PT for gait training, LE flexibility, fall prevention  -Medications - NA,  Consider gabapentin if pain is more severe Counseled regarding side effects and appropriate administration of medications.    -Diagnostics/Imaging  Reviewed MRI lumbar spine from 2019 with patient   -Injections - Consider SOHA  -Lifestyle -Encouraged regular aerobic exercise  -Education - The patient's diagnosis, prognosis and treatment options were discussed today. All questions were answered. Discussed urgent symptoms and signs to watch for  F/U - in 8 week(s) or sooner if needed.   Consider SOHA or trial of gabapetin         [facility]

## 2021-10-04 ENCOUNTER — HOSPITAL ENCOUNTER (OUTPATIENT)
Dept: PHYSICAL THERAPY | Age: 76
Discharge: HOME | End: 2021-10-04
Attending: PHYSICAL MEDICINE & REHABILITATION
Payer: MEDICARE

## 2021-10-04 PROCEDURE — 97112 NEUROMUSCULAR REEDUCATION: CPT

## 2021-10-04 PROCEDURE — 97162 PT EVAL MOD COMPLEX 30 MIN: CPT

## 2021-10-04 PROCEDURE — 97110 THERAPEUTIC EXERCISES: CPT

## 2021-10-04 NOTE — PROGRESS NOTES
PT DAILY TREATMENT NOTE     Patient Name: Naty Millard  Date:10/4/2021  : 1945  [x]  Patient  Verified  Payor: [place] MED[place]ARE / Plan: [place] MEDICARE PART A & B / Product Type: Medicare /    In time:12:45  Out time:1:30  Total Treatment Time (min): 45  Visit #: 1 of 8    Medicare/BCBS Only   Total Timed Codes (min):  25 1:1 Treatment Time:  25       Treatment Area: Spinal stenosis of lumbar region without neurogenic claudication [M48.061]    SUBJECTIVE  Pain Level (0-10 scale): 0/10  Any medication changes, allergies to medications, adverse drug reactions, diagnosis change, or new procedure performed?: [x] No    [] Yes (see summary sheet for update)  Subjective functional status/changes:   [] No changes reported  The patient reports a chief complaint of low back pain with difficulty performing chores due to balance at times. OBJECTIVE  20 min []Eval                  []Re-Eval       15 min Therapeutic Exercise:  [] See flow sheet :   Rationale: increase ROM and increase strength to improve the patients ability to improve ADL ease. 10 min Neuromuscular Re-education:  []  See flow sheet : ambulation with head turns, stepping over objects,    Rationale: improve coordination, improve balance and increase proprioception  to improve the patients ability to improve ADL ease.      With   [] TE   [] TA   [] neuro   [] other: Patient Education: [x] Review HEP    [] Progressed/Changed HEP based on:   [] positioning   [] body mechanics   [] transfers   [] heat/ice application    [] other:      Other Objective/Functional Measures: See IE     Pain Level (0-10 scale) post treatment: 0/10    ASSESSMENT/Changes in Function: See POC    Patient will continue to benefit from skilled PT services to modify and progress therapeutic interventions, address functional mobility deficits, address ROM deficits, address strength deficits, analyze and address soft tissue restrictions, analyze and cue movement patterns, analyze and modify body mechanics/ergonomics, assess and modify postural abnormalities, address imbalance/dizziness and instruct in home and community integration to attain remaining goals. []  See Plan of Care  []  See progress note/recertification  []  See Discharge Summary         Progress towards goals / Updated goals:  Short Term Goals: To be accomplished in 2 weeks:              1. The patient will demonstrate independence and compliance with HEP to maximize therapeutic benefit. IE: issued HEP  Long Term Goals: To be accomplished in 4 weeks:              1. The patient will improve FOTO score to 68 to improve ease of ADLs. IE: 61              2. The patient will improve hip ABD strength to 4+/5 MMT to improve stability in stance. IE: 4-/5 MMT B              3. The patient will improve FT EC on airex to 30\" without LOB. IE: 9\"              4. The patient will improve single leg stance to 5\" to improve ease of dressing ease.     IE 2 seconds    PLAN  []  Upgrade activities as tolerated     [x]  Continue plan of care  []  Update interventions per flow sheet       []  Discharge due to:_  []  Other:_      Timmy Sibley, PT 10/4/2021  6:58 PM    Future Appointments   Date Time Provider [place]   10/7/2021 10:00 AM Huseyin Alexander, PTA [facility]   10/11/2021 11:30 AM Aide Billingsley, PT [facility]   10/14/2021  9:15 AM Huseyin Alexander, PTA [facility]   10/18/2021 10:00 AM Lisette Piña, PTA [facility]   10/21/2021 12:00 PM [facility], PT [facility]   10/25/2021 12:00 PM [facility], PT [facility]   10/28/2021 12:00 PM [facility], PT [facility]   11/29/2021 10:45 AM Natasha Mckenzie MD [facility] BS AMB   8/18/2022  8:05 AM IO LAB VISIT [facility] BS AMB   8/25/2022 10:00 AM Arturo Allison MD [facility] BS AMB

## 2021-10-04 NOTE — PROGRESS NOTES
[facility]  [address]  [place], [address]  [phone] fax    Plan of Care/ Statement of Necessity for Physical Therapy Services    Patient name: Naveed Topete Start of Care: 10/4/2021   Referral source: Berle Mcardle* : 1945    Medical Diagnosis: Spinal stenosis of lumbar region without neurogenic claudication [M48.061]  Payor: [place] MEDICARE / Plan: [place] MEDICARE PART A & B / Product Type: Medicare /  Onset Date:Early     Treatment Diagnosis: LBP   Prior Hospitalization: see medical history Provider#: [id number]   Medications: Verified on Patient summary List    Comorbidities: Diabetes, Osteoporosis, arthritis   Prior Level of Function: The patient reports that she had improved ease of chores. The Plan of Care and following information is based on the information from the initial evaluation. Assessment/ key information: The patient is a 67year old female with a chief complaint of low back pain that has improved. She states she has been able to perform chores but has pain with reaching overhead as well as performing lifting. She does not feel like she has good balance when ambulating. The patient currently denies radicular type symptoms. Upon examination the patient has decreased rotation, extension, and side-bending of lumbar spine, and presents with weakness of core and hip strength. She has signs and symptoms consistent with mechanical back pain with decreased balance noting impairments consisting of pain, decreased ROM, decreased flexibility, decreased strength, limited ADL ease, and decreased ADL ease. The patient will benefit from skilled PT in order to address the aforementioned impairments.     Evaluation Complexity History MEDIUM  Complexity : 1-2 comorbidities / personal factors will impact the outcome/ POC ; Examination MEDIUM Complexity : 3 Standardized tests and measures addressing body structure, function, activity limitation and / or participation in recreation  ;Presentation MEDIUM Complexity : Evolving with changing characteristics  ; Clinical Decision Making MEDIUM Complexity : FOTO score of 26-74  Overall Complexity Rating: MEDIUM  Problem List: pain affecting function, decrease ROM, decrease strength, impaired gait/ balance, decrease ADL/ functional abilitiies, decrease activity tolerance and decrease flexibility/ joint mobility   Treatment Plan may include any combination of the following: Therapeutic exercise, Therapeutic activities, Neuromuscular re-education, Physical agent/modality, Gait/balance training, Manual therapy, Patient education, Self Care training, Functional mobility training, Home safety training and Stair training  Patient / Family readiness to learn indicated by: asking questions, trying to perform skills and interest  Persons(s) to be included in education: patient (P)  Barriers to Learning/Limitations: None  Patient Goal (s): better movement  Patient Self Reported Health Status: good  Rehabilitation Potential: good    Short Term Goals: To be accomplished in 2 weeks:   1. The patient will demonstrate independence and compliance with HEP to maximize therapeutic benefit. Long Term Goals: To be accomplished in 4 weeks:   1. The patient will improve FOTO score to 68 to improve ease of ADLs. 2. The patient will improve hip ABD strength to 4+/5 MMT to improve stability in stance. 3. The patient will improve FT EC on airex to 30\" without LOB. 4. The patient will improve single leg stance to 5\" to improve ease of dressing ease. Frequency / Duration: Patient to be seen 2 times per week for 4 weeks.     Patient/ Caregiver education and instruction: Diagnosis, prognosis, self care, activity modification and exercises   [x]  Plan of care has been reviewed with PTA    Certification Period: 10/04/2021 - 11/02/2021  Chanel Hallman, PT 10/4/2021 1:56 PM    ________________________________________________________________________    I certify that the above Therapy Services are being furnished while the patient is under my care. I agree with the treatment plan and certify that this therapy is necessary.     Physician's Signature:____________________  Date:____________Time: _________     Juan Antonio Arzate*  Please sign and return to [facility] [address] [address] [address] [place] [address], [address]  [phone] fax

## 2021-10-07 ENCOUNTER — HOSPITAL ENCOUNTER (OUTPATIENT)
Dept: PHYSICAL THERAPY | Age: 76
Discharge: HOME | End: 2021-10-07
Attending: PHYSICAL MEDICINE & REHABILITATION
Payer: MEDICARE

## 2021-10-07 PROCEDURE — 97110 THERAPEUTIC EXERCISES: CPT

## 2021-10-07 PROCEDURE — 97112 NEUROMUSCULAR REEDUCATION: CPT

## 2021-10-07 NOTE — PROGRESS NOTES
PT DAILY TREATMENT NOTE     Patient Name: Ciarra Fuller  Date:10/7/2021  : 1945  [x]  Patient  Verified  Payor: [place] MEDICARE / Plan: [place] MEDICARE PART A & B / Product Type: Medicare /    In time:10:05  Out time:10:38  Total Treatment Time (min): 33  Visit #: 2 of 8    Medicare/BCBS Only   Total Timed Codes (min):  33 1:1 Treatment Time:  33       Treatment Area: Spinal stenosis of lumbar region without neurogenic claudication [M48.061]    SUBJECTIVE  Pain Level (0-10 scale): 0  Any medication changes, allergies to medications, adverse drug reactions, diagnosis change, or new procedure performed?: [x] No    [] Yes (see summary sheet for update)  Subjective functional status/changes:   [] No changes reported  Pt reports feeling good today    OBJECTIVE    18 min Therapeutic Exercise:  [x] See flow sheet :   Rationale: increase ROM and increase strength to improve the patients ability to perform ADLs    15 min Neuromuscular Re-education:  [x]  See flow sheet :   Rationale: increase strength and improve coordination  to improve the patients ability to perform functional tasks          With   [] TE   [] TA   [] neuro   [] other: Patient Education: [x] Review HEP    [] Progressed/Changed HEP based on:   [] positioning   [] body mechanics   [] transfers   [] heat/ice application    [] other:      Other Objective/Functional Measures:   First f/u after Eval     Pain Level (0-10 scale) post treatment: 0    ASSESSMENT/Changes in Function: Initiated treatment program per flow sheet. Reviewed Hep for understanding and compliance. Pt is challenged with dyn gait activities, several self-corrected LOB. Demo's ms fatigue with lateral step up. Pt reports feeling better after treatment today.      Patient will continue to benefit from skilled PT services to modify and progress therapeutic interventions, address functional mobility deficits, address ROM deficits, address strength deficits, analyze and address soft tissue restrictions, analyze and cue movement patterns and analyze and modify body mechanics/ergonomics to attain remaining goals. []  See Plan of Care  []  See progress note/recertification  []  See Discharge Summary         Progress towards goals / Updated goals:  Short Term Goals: To be accomplished in 2 weeks:              4. The patient will demonstrate independence and compliance with HEP to maximize therapeutic benefit. IE: issued HEP   Current: Met, pt reports compliance 10/7/2021  Long Term Goals: To be accomplished in 4 weeks:              1. The patient will improve FOTO score to 68 to improve ease of ADLs. IE: 61              2. The patient will improve hip ABD strength to 4+/5 MMT to improve stability in stance. IE: 4-/5 MMT B              3. The patient will improve FT EC on airex to 30\" without LOB. IE: 9\"  [id number]. The patient will improve single leg stance to 5\" to improve ease of dressing ease.                IE 2 seconds    PLAN  []  Upgrade activities as tolerated     [x]  Continue plan of care  []  Update interventions per flow sheet       []  Discharge due to:_  []  Other:_      Bryce Hernandez, PTA 10/7/2021  10:15 AM    Future Appointments   Date Time Provider [facility]   10/11/2021 11:30 AM Jayson Bhakta, PT [facility]   10/14/2021  9:15 AM Princess Wright, PTA [facility]   10/18/2021 10:00 AM Lisette Piña, PTA [facility]   10/21/2021 12:00 PM Jakob Gobble, PT [facility]   10/25/2021 12:00 PM Jakob Gobble, PT [facility]   10/28/2021 12:00 PM Jakbo Gobble, PT [facility]   11/29/2021 10:45 AM Herber Sepulveda MD [facility] BS AMB   8/18/2022  8:05 AM IO LAB VISIT [facility] BS AMB   8/25/2022 10:00 AM Eunice Allison MD [facility] BS AMB

## 2021-10-11 ENCOUNTER — HOSPITAL ENCOUNTER (OUTPATIENT)
Dept: PHYSICAL THERAPY | Age: 76
Discharge: HOME | End: 2021-10-11
Attending: PHYSICAL MEDICINE & REHABILITATION
Payer: MEDICARE

## 2021-10-11 PROCEDURE — 97110 THERAPEUTIC EXERCISES: CPT

## 2021-10-11 PROCEDURE — 97112 NEUROMUSCULAR REEDUCATION: CPT

## 2021-10-11 NOTE — PROGRESS NOTES
PT DAILY TREATMENT NOTE     Patient Name: Sofya Ross  Date:10/11/2021  : 1945  [x]  Patient  Verified  Payor: VA MEDICARE / Plan: VA MEDICARE PART A & B / Product Type: Medicare /    In time:1130AM  Out time:1210PM  Total Treatment Time (min): 40  Visit #: 3 of 8    Medicare/BCBS Only   Total Timed Codes (min):  40 1:1 Treatment Time:  40       Treatment Area: Spinal stenosis of lumbar region without neurogenic claudication [M48.061]    SUBJECTIVE  Pain Level (0-10 scale): 1  Any medication changes, allergies to medications, adverse drug reactions, diagnosis change, or new procedure performed?: [x] No    [] Yes (see summary sheet for update)  Subjective functional status/changes:   [] No changes reported  Pt reports her back is bothering her a bit more today, potentially due to the weather. No adverse response to last tx. OBJECTIVE    25 min Therapeutic Exercise:  [x] See flow sheet :   Rationale: increase ROM and increase strength to improve the patients ability to manage ADLs with improved ease. 15 min Neuromuscular Re-education:  [x]  See flow sheet :   Rationale: increase strength, improve coordination, improve balance and increase proprioception  to improve the patients ability to manage functional activities with improved ease and stability to reduce fall risk. With   [] TE   [] TA   [] neuro   [] other: Patient Education: [x] Review HEP    [] Progressed/Changed HEP based on:   [] positioning   [] body mechanics   [] transfers   [] heat/ice application    [] other:      Other Objective/Functional Measures: EO/EC with MT and FT with and without airex     Pain Level (0-10 scale) post treatment: 1    ASSESSMENT/Changes in Function: Pt still has some general low back soreness following treatment today, which she attributes to the weather. Challenged pt with ty stepping today, encouraging her to lead with the left LE = right LE to challenge right LE stability.  Pt does well with STS today, demonstrating symmetrical WB and able to perform without UE assist. May be able to lower plinth to it's lowest setting or add an airex cushion to further challenge stability. Patient will continue to benefit from skilled PT services to modify and progress therapeutic interventions, address functional mobility deficits, address ROM deficits, address strength deficits, analyze and address soft tissue restrictions, analyze and cue movement patterns, analyze and modify body mechanics/ergonomics, assess and modify postural abnormalities, address imbalance/dizziness and instruct in home and community integration to attain remaining goals. []  See Plan of Care  []  See progress note/recertification  []  See Discharge Summary         Progress towards goals / Updated goals:  Short Term Goals: To be accomplished in 2 weeks:              2. The patient will demonstrate independence and compliance with HEP to maximize therapeutic benefit.              ZX: issued HEP              Current: Met, pt reports compliance 10/7/2021  Long Term Goals: To be accomplished in 4 weeks:              1. The patient will improve FOTO score to 68 to improve ease of ADLs.             QA: 51              6. The patient will improve hip ABD strength to 4+/5 MMT to improve stability in stance.              IE: 4-/5 MMT B              3. The patient will improve FT EC on airex to 30\" without LOB.             LV: 9\"   Current: 3 sec initial test, 15 seconds second test. (10/11/2021)              4.  The patient will improve single leg stance to 5\" to improve ease of dressing ease.               IE 2 seconds    PLAN  []  Upgrade activities as tolerated     [x]  Continue plan of care  []  Update interventions per flow sheet       []  Discharge due to:_  []  Other:_      Kaitlin Vazquez, PT 10/11/2021  11:34 AM    Future Appointments   Date Time Provider [facility]   10/14/2021  9:15 AM Lisa Vaz, [facility]   10/18/2021 10:00 AM Steve Piñaah AMINTA, PTA [facility]   10/21/2021 12:00 PM Lisa Coates, PT [facility]   10/25/2021 12:00 PM Lisa Coates, PT [facility]   10/28/2021 12:00 PM Lisa Coates, PT [facility]   11/29/2021 10:45 AM Mindy Deleon MD [facility] BS AMB   8/18/2022  8:05 AM [facility] LAB VISIT [facility] BS AM[facility]   8/25/2022 10:00 AM Evan Allison MD [facility] BS AMB

## 2021-10-14 ENCOUNTER — HOSPITAL ENCOUNTER (OUTPATIENT)
Dept: PHYSICAL THERAPY | Age: 76
Discharge: HOME | End: 2021-10-14
Attending: PHYSICAL MEDICINE & REHABILITATION
Payer: MEDICARE

## 2021-10-14 PROCEDURE — 97110 THERAPEUTIC EXERCISES: CPT

## 2021-10-14 PROCEDURE — 97112 NEUROMUSCULAR REEDUCATION: CPT

## 2021-10-14 NOTE — PROGRESS NOTES
PT DAILY TREATMENT NOTE     Patient Name: Kenneth Murphy  Date:10/14/2021  : 1945  [x]  Patient  Verified  Payor: VA MEDICARE / Plan: VA MEDICARE PART A & B / Product Type: Medicare /    In time:9:15  Out time:9:59  Total Treatment Time (min): 44  Visit #: 4 of 8    Medicare/BCBS Only   Total Timed Codes (min):  44 1:1 Treatment Time:  44       Treatment Area: Spinal stenosis of lumbar region without neurogenic claudication [M48.061]    SUBJECTIVE  Pain Level (0-10 scale): 0  Any medication changes, allergies to medications, adverse drug reactions, diagnosis change, or new procedure performed?: [x] No    [] Yes (see summary sheet for update)  Subjective functional status/changes:   [] No changes reported  Pt reports feeling good, no pain right now    OBJECTIVE    29 min Therapeutic Exercise:  [x] See flow sheet :   Rationale: increase ROM and increase strength to improve the patients ability to perform ADLs    15 min Neuromuscular Re-education:  [x]  See flow sheet :   Rationale: increase strength, improve coordination, improve balance and increase proprioception  to improve the patients ability to perform functional tasks          With   [] TE   [] TA   [] neuro   [] other: Patient Education: [x] Review HEP    [] Progressed/Changed HEP based on:   [] positioning   [] body mechanics   [] transfers   [] heat/ice application    [] other:      Other Objective/Functional Measures:      Pain Level (0-10 scale) post treatment: 0    ASSESSMENT/Changes in Function: Challenged with standing TR without UE support d/t excessive hip correction with posterior lean. Vc's to avoid circumduction with ty step over. Cont's to be greatly challenged with EC balance activities. Pt reports feeling good after doing exercises and reports no recent falls.      Patient will continue to benefit from skilled PT services to modify and progress therapeutic interventions, address functional mobility deficits, address ROM deficits, address strength deficits, analyze and address soft tissue restrictions, analyze and cue movement patterns, analyze and modify body mechanics/ergonomics and address imbalance/dizziness to attain remaining goals. []  See Plan of Care  []  See progress note/recertification  []  See Discharge Summary         Progress towards goals / Updated goals:  Short Term Goals: To be accomplished in 2 weeks:              4. The patient will demonstrate independence and compliance with HEP to maximize therapeutic benefit.              IE: issued HEP              [id number]: Met, pt reports compliance 10/7/2021  Long Term Goals: To be accomplished in 4 weeks:              1. The patient will improve FOTO score to 68 to improve ease of ADLs.             X              3. The patient will improve hip ABD strength to 4+/5 MMT to improve stability in stance.              IE: 4-/5 MMT B              3. The patient will improve FT EC on airex to 30\" without LOB.             OU: 9\"              Current: 3 sec initial test, 15 seconds second test. (10/11/2021)              4.  The patient will improve single leg stance to 5\" to improve ease of dressing ease.               IE 2 seconds    PLAN  []  Upgrade activities as tolerated     [x]  Continue plan of care  []  Update interventions per flow sheet       []  Discharge due to:_  []  Other:_      Yojana Larose PTA 10/14/2021  9:18 AM    Future Appointments   Date Time Provider [facility]   10/18/2021 10:00 [facility]   [facility]21[facility]2021 12:00 PM [facility]   10/25/2021 12:00 PM [facility]   10/28/2021 12:00 PM [facility]   2021 10:45 AM Roverto Jones MD [facility] BS AMB   2022  8:05 AM IO LAB VISIT [facility] BS AMB   2022 10:00 AM Zach Allison MD [facility] BS AMB

## 2021-10-18 ENCOUNTER — HOSPITAL ENCOUNTER (OUTPATIENT)
Dept: PHYSICAL THERAPY | Age: 76
Discharge: HOME | End: 2021-10-18
Attending: PHYSICAL MEDICINE & REHABILITATION
Payer: MEDICARE

## 2021-10-18 PROCEDURE — 97112 NEUROMUSCULAR REEDUCATION: CPT

## 2021-10-18 PROCEDURE — 97110 THERAPEUTIC EXERCISES: CPT

## 2021-10-18 NOTE — PROGRESS NOTES
PT DAILY TREATMENT NOTE     Patient Name: Gadiel Sigala  Date:10/18/2021  : 1945  [x]  Patient  Verified  Payor: VA MEDICARE / Plan: VA MEDICARE PART A & B / Product Type: Medicare /    In time:10:05  Out time:10:48  Total Treatment Time (min): 43  Visit #: 5 of 8    Medicare/BCBS Only   Total Timed Codes (min):  43 1:1 Treatment Time:  43       Treatment Area: Spinal stenosis of lumbar region without neurogenic claudication [M48.061]    SUBJECTIVE  Pain Level (0-10 scale): 0  Any medication changes, allergies to medications, adverse drug reactions, diagnosis change, or new procedure performed?: [x] No    [] Yes (see summary sheet for update)  Subjective functional status/changes:   [] No changes reported  Pt reports feeling good today, neck was a little sore over the weekend after doing the head turns    OBJECTIVE    28 min Therapeutic Exercise:  [x] See flow sheet :   Rationale: increase ROM and increase strength to improve the patients ability to perform ADLs    15 min Neuromuscular Re-education:  [x]  See flow sheet :   Rationale: increase strength and improve coordination  to improve the patients ability to perform functional tasks        With   [] TE   [] TA   [] neuro   [] other: Patient Education: [x] Review HEP    [] Progressed/Changed HEP based on:   [] positioning   [] body mechanics   [] transfers   [] heat/ice application    [] other:      Other Objective/Functional Measures:   Performed standing therex with 1 UE finger tip touch for balance   SBA with dyn gait activities    Pain Level (0-10 scale) post treatment: 0    ASSESSMENT/Changes in Function: Cont's to be greatly challenged with SL stability although slightly improved. Several self- corrected LOB/corrective steps taken with Dyn gait activities. Pt reports fatigue following treatment but denies pain and denies dizziness throughout.      Patient will continue to benefit from skilled PT services to modify and progress therapeutic interventions, address functional mobility deficits, address ROM deficits, address strength deficits, analyze and address soft tissue restrictions, analyze and cue movement patterns and analyze and modify body mechanics/ergonomics to attain remaining goals. []  See Plan of Care  []  See progress note/recertification  []  See Discharge Summary         Progress towards goals / Updated goals:  Short Term Goals: To be accomplished in 2 weeks:              5. The patient will demonstrate independence and compliance with HEP to maximize therapeutic benefit.              IE: issued HEP              [id number]: Met, pt reports compliance 10/7/2021  Long Term Goals: To be accomplished in 4 weeks:              1. The patient will improve FOTO score to 68 to improve ease of ADLs.             CT: 35              4. The patient will improve hip ABD strength to 4+/5 MMT to improve stability in stance.              IE: 4-/5 MMT B              3. The patient will improve FT EC on airex to 30\" without LOB.             GS: 9\"              Current: 3 sec initial test, 15 seconds second test. (10/11/2021)              4.  The patient will improve single leg stance to 5\" to improve ease of dressing ease.               IE 2 seconds   Current:SLS up to 4 sec 10/18/2021    PLAN  []  Upgrade activities as tolerated     [x]  Continue plan of care  []  Update interventions per flow sheet       []  Discharge due to:_  []  Other:_      Shyla Preciado, KALEIGH 10/18/2021  10:03 AM    Future Appointments   Date Time Provider [facility]   10/21/2021 12:00 PM [facility]   10/25/2021 12:00 PM Neto Axon, PT [facility]   10/28/2021 12:00 PM Neto Axon, PT [facility]   11/29/2021 10:45 AM Gerry Cope MD [facility] BS AMB   8/18/2022  8:05 AM [facility] LAB VISIT [facility] BS AMB   8/25/2022 10:00 AM Florencia Allison MD [facility] BS AMB

## 2021-10-21 ENCOUNTER — APPOINTMENT (OUTPATIENT)
Dept: PHYSICAL THERAPY | Age: 76
End: 2021-10-21
Attending: PHYSICAL MEDICINE & REHABILITATION
Payer: MEDICARE

## 2021-10-25 ENCOUNTER — APPOINTMENT (OUTPATIENT)
Dept: PHYSICAL THERAPY | Age: 76
End: 2021-10-25
Attending: PHYSICAL MEDICINE & REHABILITATION
Payer: MEDICARE

## 2021-10-28 ENCOUNTER — HOSPITAL ENCOUNTER (OUTPATIENT)
Dept: PHYSICAL THERAPY | Age: 76
Discharge: HOME | End: 2021-10-28
Attending: PHYSICAL MEDICINE & REHABILITATION
Payer: MEDICARE

## 2021-10-28 PROCEDURE — 97112 NEUROMUSCULAR REEDUCATION: CPT

## 2021-10-28 PROCEDURE — 97110 THERAPEUTIC EXERCISES: CPT

## 2021-10-28 NOTE — PROGRESS NOTES
PT DAILY TREATMENT NOTE     Patient Name: Naty Millard  Date:10/28/2021  : 1945  [x]  Patient  Verified  Payor: [place] MEDICARE / Plan: VA MEDICARE PART A & B / Product Type: Medicare /    In time:12:02  Out time:12:40  Total Treatment Time (min): 38  Visit #: 6 of 8    Medicare/BCBS Only   Total Timed Codes (min):  38 1:1 Treatment Time:  38       Treatment Area: Spinal stenosis of lumbar region without neurogenic claudication [M48.061]    SUBJECTIVE  Pain Level (0-10 scale): 0/10  Any medication changes, allergies to medications, adverse drug reactions, diagnosis change, or new procedure performed?: [x] No    [] Yes (see summary sheet for update)  Subjective functional status/changes:   [] No changes reported  The patient reports that her back doesn't bother her unless she turns a certain way. She feels her balance may be a little better, but it is hard for her to say. OBJECTIVE  28 min Therapeutic Exercise:  [x] See flow sheet :   Rationale: increase ROM and increase strength to improve the patients ability to improve ADL ease. 10 min Neuromuscular Re-education:  [x]  See flow sheet :   Rationale: improve coordination, improve balance and increase proprioception  to improve the patients ability to improve ADL ease. With   [] TE   [] TA   [] neuro   [] other: Patient Education: [x] Review HEP    [] Progressed/Changed HEP based on:   [] positioning   [] body mechanics   [] transfers   [] heat/ice application    [] other:      Other Objective/Functional Measures: FOTO: 61    Hip ABD: right 4+/5 MMT; left 4/5 MMT    SLS 4\" B    EC airex: 18\"     Pain Level (0-10 scale) post treatment: 0/10    ASSESSMENT/Changes in Function: The patient has made progress regarding stair negotiation as well as hip strength bilaterally. She will continue to benefit from an additional 3 weeks of PT in order to progress strengthening and balance interventions with anticipated D/C following these 3 weeks. The patient is in agreement to this plan. Patient will continue to benefit from skilled PT services to modify and progress therapeutic interventions, address functional mobility deficits, address ROM deficits, address strength deficits, analyze and address soft tissue restrictions, analyze and cue movement patterns, analyze and modify body mechanics/ergonomics, assess and modify postural abnormalities, address imbalance/dizziness and instruct in home and community integration to attain remaining goals. []  See Plan of Care  []  See progress note/recertification  []  See Discharge Summary         Progress towards goals / Updated goals:  Short Term Goals: To be accomplished in 2 weeks:              4. The patient will demonstrate independence and compliance with HEP to maximize therapeutic benefit.              IE: issued HEP              VZZXHAW: Met, pt reports compliance 10/7/2021  Long Term Goals: To be accomplished in 4 weeks:              1. The patient will improve FOTO score to 68 to improve ease of ADLs.             RC: 45   Current: Progressed slightly to 61 10/28/2021              2. The patient will improve hip ABD strength to 4+/5 MMT to improve stability in stance.              IE: 4-/5 MMT B   Current: Nearly met right 4+/5 MMT, left 4/5 MMT              3. The patient will improve FT EC on airex to 30\" without LOB.             IQ: 9\"              LYGDXRK: GOKOGKVLTC to 18\" SLS B 10/28/2021              4.  The patient will improve single leg stance to 5\" to improve ease of dressing ease.               IE: 2 seconds               Current: Nearly met - SLS up to 4 sec 10/28/2021    PLAN  []  Upgrade activities as tolerated     [x]  Continue plan of care  []  Update interventions per flow sheet       []  Discharge due to:_  []  Other:_      Timmy Call, PT 10/28/2021  12:12 PM    Future Appointments   Date Time Provider [facility]   11/29/2021 10:45 AM Natasha Mckenzie MD [facility] BS AMB   8/18/2022  8:05 AM [facility] LAB VISIT [facility] BS AMB   8/25/2022 10:00 AM Eunice Allison MD [facility] BS AMB

## 2021-10-28 NOTE — PROGRESS NOTES
[facility]  [address]  [place], [address]  [phone] fax    Continued Plan of Care/ Re-certification for Physical Therapy Services    Patient name: Shanna Pineda Start of Care: 10/4/2021   Referral source: Ector Pro* : 1945               Medical Diagnosis: Spinal stenosis of lumbar region without neurogenic claudication [M48.061]  Payor: VA MEDICARE / Plan: VA MEDICARE PART A & B / Product Type: Medicare /  Onset Date:Early                Treatment Diagnosis: LBP   Prior Hospitalization: see medical history Provider#: [id number]   Medications: Verified on Patient summary List    Comorbidities: Diabetes, Osteoporosis, arthritis   Prior Level of Function: The patient reports that she had improved ease of chores. Visits from Start of Care: 6    Missed Visits: 0    The Plan of Care and following information is based on the patient's current status:  Short Term Goals: To be accomplished in 2 weeks:              8. The patient will demonstrate independence and compliance with HEP to maximize therapeutic benefit.              IE: issued HEP              GYERZUC: Met, pt reports compliance 10/7/2021  Long Term Goals: To be accomplished in 4 weeks:              1. The patient will improve FOTO score to 68 to improve ease of ADLs.             NR: 97              Current: Progressed slightly to 61 10/28/2021              2. The patient will improve hip ABD strength to 4+/5 MMT to improve stability in stance.              IE: 4-/5 MMT B              Current: Nearly met right 4+/5 MMT, left 4/5 MMT              3. The patient will improve FT EC on airex to 30\" without LOB.             RS: 9\"              CYTLNTD: AABJCIKPOO to 18\" SLS B 10/28/2021              4.  The patient will improve single leg stance to 5\" to improve ease of dressing ease.               IE: 2 seconds               Current: Nearly met - SLS up to 4 sec 10/28/2021    Key functional changes: Improved hip strength allowing for improved stability in stance. Improved single leg stance time allowing for reduced falls risk, slightly improved FOTO score allowing for improved ADLs. Problems/ barriers to goal attainment: None     Problem List: pain affecting function, decrease strength, impaired gait/ balance, decrease ADL/ functional abilitiies, decrease activity tolerance, decrease flexibility/ joint mobility and decrease transfer abilities    Treatment Plan: Therapeutic exercise, Therapeutic activities, Neuromuscular re-education, Physical agent/modality, Gait/balance training, Patient education, Self Care training, Functional mobility training, Home safety training and Stair training     Patient Goal (s) has been updated and includes: better balance     Goals for this certification period to be accomplished in 2 weeks:  1. The patient will improve FOTO score to 68 to improve ease of ADLs.             Re-cert Progressed slightly to 61               2. The patient will improve hip ABD strength to 4+/5 MMT to improve stability in stance.              Re-cert: Nearly met right 4+/5 MMT, left 4/5 MMT              3. The patient will improve FT EC on airex to 30\" without LOB.             Re-cert: Progressed to 67\" SLS B               4. The patient will improve single leg stance to 5\" to improve ease of dressing ease.               Re-cert: Nearly met - SLS up to 4 sec     Frequency / Duration: Patient to be seen 2 times per week for 4 weeks:    Assessment / Recommendations: The patient has made progress regarding stair negotiation as well as hip strength bilaterally. She will continue to benefit from an additional 3 weeks of PT in order to progress strengthening and balance interventions with anticipated D/C following these 3 weeks. The patient is in agreement to this plan.      Certification Period: 10/28/2021 - 11/20/2021    Jody Arreola, PT 10/28/2021 1:53 PM    ________________________________________________________________________  I certify that the above Therapy Services are being furnished while the patient is under my care. I agree with the treatment plan and certify that this therapy is necessary. [] I have read the above and request that my patient continue as recommended.   [] I have read the above report and request that my patient continue therapy with the following changes/special instructions: _____________________________________________  [] I have read the above report and request that my patient be discharged from therapy    Physician's Signature:____________Date:_________TIME:________     Nicole Montelongo*  ** Signature, Date and Time must be completed for valid certification **    Please sign and return to [facility] [address] [address] [address]  [place], [address]  [phone] fax

## 2021-11-02 ENCOUNTER — HOSPITAL ENCOUNTER (OUTPATIENT)
Dept: PHYSICAL THERAPY | Age: 76
Discharge: HOME | End: 2021-11-02
Attending: PHYSICAL MEDICINE & REHABILITATION
Payer: MEDICARE

## 2021-11-02 PROCEDURE — 97112 NEUROMUSCULAR REEDUCATION: CPT

## 2021-11-02 PROCEDURE — 97110 THERAPEUTIC EXERCISES: CPT

## 2021-11-02 NOTE — PROGRESS NOTES
PT DAILY TREATMENT NOTE     Patient Name: Naveed Topete  Date:2021  : 1945  [x]  Patient  Verified  Payor: VA MEDICARE / Plan: VA MEDICARE PART A & B / Product Type: Medicare /    In time: 3:47 PM  Out time: 4:26 PM  Total Treatment Time (min): 39  Visit #: 1 of 8    Medicare/BCBS Only   Total Timed Codes (min):  39 1:1 Treatment Time:  39       Treatment Area: Spinal stenosis of lumbar region without neurogenic claudication [M48.061]    SUBJECTIVE  Pain Level (0-10 scale): 0  Any medication changes, allergies to medications, adverse drug reactions, diagnosis change, or new procedure performed?: [x] No    [] Yes (see summary sheet for update)  Subjective functional status/changes:   [x] No changes reported    OBJECTIVE    15 min Therapeutic Exercise:  [x] See flow sheet :   Rationale: increase ROM and increase strength to improve the patients ability to perform ADLs    24 min Neuromuscular Re-education:  [x]  See flow sheet :    Rationale: increase strength, improve coordination and increase proprioception  to improve the patients ability to navigate dynamic environments with improved stability and safety          With   [] TE   [] TA   [] neuro   [] other: Patient Education: [x] Review HEP    [] Progressed/Changed HEP based on:   [] positioning   [] body mechanics   [] transfers   [] heat/ice application    [] other:      Other Objective/Functional Measures: Additional dynamic gait activities      Pain Level (0-10 scale) post treatment: 0    ASSESSMENT/Changes in Function: Patient appropriately challenged with balance and dynamic gait activities demonstrating timely postural righting. Fair stability with heel-toe exercise, requires cues to shift center of mass to avoid retropulsion. Minimal compensations with standing hip girdle strengthening.      Patient will continue to benefit from skilled PT services to modify and progress therapeutic interventions, address functional mobility deficits, address ROM deficits, address strength deficits, analyze and cue movement patterns, analyze and modify body mechanics/ergonomics, address imbalance/dizziness and instruct in home and community integration to attain remaining goals. Progress towards goals / Updated goals:  1. The patient will improve FOTO score to 68 to improve ease of ADLs.             Re-cert Progressed slightly to 61   2. The patient will improve hip ABD strength to 4+/5 MMT to improve stability in stance.              Re-cert: Nearly met right 4+/5 MMT, left 4/5 MM  3. The patient will improve FT EC on airex to 30\" without LOB.             Re-cert: Progressed to 61\" SLS B   4.  The patient will improve single leg stance to 5\" to improve ease of dressing ease.               Re-cert: Nearly met - SLS up to 4 sec     PLAN  [x]  Upgrade activities as tolerated     []  Continue plan of care  []  Update interventions per flow sheet       []  Discharge due to:_  []  Other:_      Jack Strong, PT 11/2/2021  3:49 PM    Future Appointments   Date Time Provider [facility]   11/5/2021  2:15 PM Raheel, Jayden0 [facility]   11/11/2021  1:45 PM [facility]   11/16/2021 12:45 PM [facility]   11/29/2021 10:45 AM Stanley Massey MD [facility] BS AMB   8/18/2022  8:05 AM [facility] LAB VISIT [facility] BS AMB   8/25/2022 10:00 AM Niko Allison MD [facility] BS AMB

## 2021-11-05 ENCOUNTER — HOSPITAL ENCOUNTER (OUTPATIENT)
Dept: PHYSICAL THERAPY | Age: 76
Discharge: HOME | End: 2021-11-05
Attending: PHYSICAL MEDICINE & REHABILITATION
Payer: MEDICARE

## 2021-11-05 PROCEDURE — 97110 THERAPEUTIC EXERCISES: CPT

## 2021-11-05 PROCEDURE — 97112 NEUROMUSCULAR REEDUCATION: CPT

## 2021-11-05 NOTE — PROGRESS NOTES
PT DAILY TREATMENT NOTE     Patient Name: Jessica Treadwell  Date:2021  : 1945  [x]  Patient  Verified  Payor: VA MEDICARE / Plan: VA MEDICARE PART A & B / Product Type: Medicare /    In time:2:15  Out time:2:49  Total Treatment Time (min): 34  Visit #: 2 of 8    Medicare/BCBS Only   Total Timed Codes (min):  34 1:1 Treatment Time:  34       Treatment Area: Spinal stenosis of lumbar region without neurogenic claudication [M48.061]    SUBJECTIVE  Pain Level (0-10 scale): 0  Any medication changes, allergies to medications, adverse drug reactions, diagnosis change, or new procedure performed?: [x] No    [] Yes (see summary sheet for update)  Subjective functional status/changes:   [] No changes reported  The pt reports she hasn't noticed much change in her balance with day to day activities    OBJECTIVE    12 min Therapeutic Exercise:  [] See flow sheet :   Rationale: increase ROM and increase strength to improve the patients ability to perform daily tasks and self care     22 min Neuromuscular Re-education:  []  See flow sheet :   Rationale: improve coordination, improve balance and increase proprioception  to improve the patients ability to perform functional tasks with improved balance and proprioception            With   [] TE   [] TA   [] neuro   [] other: Patient Education: [x] Review HEP    [] Progressed/Changed HEP based on:   [] positioning   [] body mechanics   [] transfers   [] heat/ice application    [] other:      Other Objective/Functional Measures:      Pain Level (0-10 scale) post treatment: 0    ASSESSMENT/Changes in Function: The pt demonstrates improved stability with eyes closed balance on compliant surface. She reports a bit of instability with ambulation that she feels may be related to her knees which are a bit uncomfortable today.  Pt progressing well with objective goals, progress towards D/C over remaining visits    Patient will continue to benefit from skilled PT services to modify and progress therapeutic interventions, address functional mobility deficits, address ROM deficits, address strength deficits, analyze and address soft tissue restrictions, analyze and cue movement patterns, analyze and modify body mechanics/ergonomics and address imbalance/dizziness to attain remaining goals. []  See Plan of Care  []  See progress note/recertification  []  See Discharge Summary         Progress towards goals / Updated goals:  1. The patient will improve FOTO score to 68 to improve ease of ADLs.             Re-cert Progressed slightly to 61   2. The patient will improve hip ABD strength to 4+/5 MMT to improve stability in stance.              Re-cert: Nearly met right 4+/5 MMT, left 4/5 MM  3. The patient will improve FT EC on airex to 30\" without LOB.             Re-cert: Progressed to 24\" SLS B   Current: near met 25\" 11/5/2021   4.  The patient will improve single leg stance to 5\" to improve ease of dressing ease.               Re-cert: Nearly met - SLS up to 4 sec     PLAN  [x]  Upgrade activities as tolerated     []  Continue plan of care  []  Update interventions per flow sheet       []  Discharge due to:_  []  Other:_      Albania Crouch DPT CMTPT 11/5/2021  2:18 PM    Future Appointments   Date Time Provider [facility]   11/11/2021  1:45 PM [facility]   11/16/2021 12:45 PM [facility]g [facility]   11/29/2021 10:45 AM MD [facility] BS AMB   8/18/2022  8:05 AM [facility] LAB VISIT [facility] BS AMB   8/25/2022 10:00 AM Shanell Allison MD [facility] BS AMB

## 2021-11-11 ENCOUNTER — APPOINTMENT (OUTPATIENT)
Dept: PHYSICAL THERAPY | Age: 76
End: 2021-11-11
Attending: PHYSICAL MEDICINE & REHABILITATION
Payer: MEDICARE

## 2021-11-16 ENCOUNTER — HOSPITAL ENCOUNTER (OUTPATIENT)
Dept: PHYSICAL THERAPY | Age: 76
Discharge: HOME | End: 2021-11-16
Attending: PHYSICAL MEDICINE & REHABILITATION
Payer: MEDICARE

## 2021-11-16 PROCEDURE — 97112 NEUROMUSCULAR REEDUCATION: CPT

## 2021-11-16 PROCEDURE — 97110 THERAPEUTIC EXERCISES: CPT

## 2021-11-16 NOTE — PROGRESS NOTES
[facility]  [address]  [place], [address]  [phone] fax    Physical Therapy Discharge Summary  Patient name: Adithya Lewis Start of Care: 10/4/2021   Referral source: Nicole Gregorio* EAR: 1/19/9099               Medical Diagnosis: Spinal stenosis of lumbar region without neurogenic claudication [M48.061]  Payor: VA MEDICARE / Plan: VA MEDICARE PART A & B / Product Type: Medicare /  Onset Date:Early 2021               Treatment Diagnosis: LBP   Prior Hospitalization: see medical history Provider#: [id number]   Medications: Verified on Patient summary List    Comorbidities: Diabetes, Osteoporosis, arthritis   Prior Level of Function: The patient reports that she had improved ease of chores.   Visits from Start of Care: 9    Missed Visits: 0  Reporting Period : 10/28/2021 to 11/16/2021      Summary of Care:  1. The patient will improve FOTO score to 68 to improve ease of ADLs.             Re-cert Progressed slightly to 61               Current: Not met - remains 61 11/16/2021  2. The patient will improve hip ABD strength to 4+/5 MMT to improve stability in stance.              Re-cert: Nearly met right 4+/5 MMT, left 4/5 MM              Current: Met 5/5 MMT  3. The patient will improve FT EC on airex to 30\" without LOB.             Re-cert: Progressed to 21\" SLS B              NDKINNF: Met 30\" FT EX on airex without LOB  4. The patient will improve single leg stance to 5\" to improve ease of dressing ease.               Re-cert: Nearly met - SLS up to 4 sec               Current: Met right SLS > 5\", not met left 4\" 11/16/2021    ASSESSMENT/RECOMMENDATIONS: The patient has progressed well with regards to hip ABD strength. Her FOTO score remains 61 with no change since last assessment, though her single leg stance time has improved, but remained shy of goal concerning her left LE.  Due to upcoming holiday season and lack of progress with FOTO, the patient agrees to D/C today and states she will continue with HEP. She leaves in no pain and has all questions answered.      [x]Discontinue therapy: Fairly good progress towards some goals, yet not meeting FOTO score goal.     Timmy Sibley, PT 11/16/2021 1:51 PM

## 2021-11-16 NOTE — PROGRESS NOTES
PT DAILY TREATMENT NOTE     Patient Name: John Romano  Date:2021  : 1945  [x]  Patient  Verified  Payor: VA MEDICARE / Plan: VA MEDICARE PART A & B / Product Type: Medicare /    In time:12:45  Out time:1:26  Total Treatment Time (min): 41  Visit #: 3 of 8    Medicare/BCBS Only   Total Timed Codes (min):  41 1:1 Treatment Time:  41       Treatment Area: Spinal stenosis of lumbar region without neurogenic claudication [M48.061]    SUBJECTIVE  Pain Level (0-10 scale): 0/10  Any medication changes, allergies to medications, adverse drug reactions, diagnosis change, or new procedure performed?: [x] No    [] Yes (see summary sheet for update)  Subjective functional status/changes:   [] No changes reported  The patient reports that she continues to feel some instability with her walking, but does feel some improvement. She states she has not been to therapy for a while due to recently returning from [place]. OBJECTIVE  26 min Therapeutic Exercise:  [] See flow sheet :   Rationale: improve coordination, improve balance and increase proprioception to improve the patients ability to improve     15 min Neuromuscular Re-education:  [x]  See flow sheet :   Rationale: improve coordination, improve balance and increase proprioception  to improve the patients ability to improve ADL ease. With   [] TE   [] TA   [] neuro   [] other: Patient Education: [x] Review HEP    [] Progressed/Changed HEP based on:   [] positioning   [] body mechanics   [] transfers   [] heat/ice application    [] other:      Other Objective/Functional Measures:   Hip ABD strength; 5/5 MMT   FOTO: 61  SLS: right > 5\" left; 4\"    Pain Level (0-10 scale) post treatment: 0/10    ASSESSMENT/Changes in Function: The patient has progressed well with regards to hip ABD strength.  Her FOTO score remains 61 with no change since last assessment, though her single leg stance time has improved, but remained shy of goal concerning her left LE. Due to upcoming holiday season and lack of progress with FOTO, the patient agrees to D/C today and states she will continue with HEP. She leaves in no pain and has all questions answered. []  See Plan of Care  []  See progress note/recertification  []  See Discharge Summary         Progress towards goals / Updated goals:  1. The patient will improve FOTO score to 68 to improve ease of ADLs.             Re-cert Progressed slightly to 61    Current: Not met - remains 61 11/16/2021  2. The patient will improve hip ABD strength to 4+/5 MMT to improve stability in stance.              Re-cert: Nearly met right 4+/5 MMT, left 4/5 MM   Current: Met 5/5 MMT  3. The patient will improve FT EC on airex to 30\" without LOB.             Re-cert: Progressed to 12\" SLS B              Current: Met 30\" FT EX on airex without LOB  4.  The patient will improve single leg stance to 5\" to improve ease of dressing ease.               Re-cert: Nearly met - SLS up to 4 sec    Current: Met right SLS > 5\", not met left 4\" 11/16/2021  PLAN  []  Upgrade activities as tolerated     []  Continue plan of care  []  Update interventions per flow sheet       [x]  Discharge due to:_  []  Other:_      Marielena Ryan, PT 11/16/2021  1:13 PM    Future Appointments   Date Time Provider [facility]   11/29/2021 10:45 AM MD [facility]vez[facility] BS AMB   8/18/2022  8:05 AM [facility] LAB VISIT [facility] BS AMB   8/25/2022 10:00 AM Florencia Allison MD [facility] BS AMB

## 2021-12-24 ENCOUNTER — APPOINTMENT (OUTPATIENT)
Dept: GENERAL RADIOLOGY | Age: 76
End: 2021-12-24
Attending: EMERGENCY MEDICINE
Payer: MEDICARE

## 2021-12-24 ENCOUNTER — HOSPITAL ENCOUNTER (EMERGENCY)
Age: 76
Discharge: HOME | End: 2021-12-24
Attending: EMERGENCY MEDICINE
Payer: MEDICARE

## 2021-12-24 VITALS
TEMPERATURE: 98.5 F | DIASTOLIC BLOOD PRESSURE: 96 MMHG | BODY MASS INDEX: 28.13 KG/M2 | RESPIRATION RATE: 18 BRPM | WEIGHT: 149 LBS | SYSTOLIC BLOOD PRESSURE: 180 MMHG | HEIGHT: 61 IN | OXYGEN SATURATION: 95 % | HEART RATE: 92 BPM
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
## 2021-12-24 DIAGNOSIS — T07.XXXA MULTIPLE LACERATIONS: ICD-10-CM

## 2021-12-24 PROCEDURE — 90715 TDAP VACCINE 7 YRS/> IM: CPT | Performed by: EMERGENCY MEDICINE

## 2021-12-24 PROCEDURE — 99283 EMERGENCY DEPT VISIT LOW MDM: CPT

## 2021-12-24 PROCEDURE — 74011250636 HC RX REV CODE- 250/636: Performed by: EMERGENCY MEDICINE

## 2021-12-24 PROCEDURE — 74011250637 HC RX REV CODE- 250/637: Performed by: EMERGENCY MEDICINE

## 2021-12-24 PROCEDURE — 90471 IMMUNIZATION ADMIN: CPT

## 2021-12-24 PROCEDURE — 75810000293 HC SIMP/SUPERF WND  RPR

## 2021-12-24 PROCEDURE — 73130 X-RAY EXAM OF HAND: CPT

## 2021-12-24 RX ORDER — AMOXICILLIN AND CLAVULANATE POTASSIUM 875; 125 MG/1; MG/1
1 TABLET, FILM COATED ORAL 2 TIMES DAILY
Qty: 14 TABLET | Refills: 0 | Status: SHIPPED | OUTPATIENT
Start: 2021-12-24 | End: 2021-12-31

## 2021-12-24 RX ORDER — AMOXICILLIN AND CLAVULANATE POTASSIUM 875; 125 MG/1; MG/1
1 TABLET, FILM COATED ORAL
Status: COMPLETED | OUTPATIENT
Start: 2021-12-24 | End: 2021-12-24

## 2021-12-24 RX ADMIN — TETANUS TOXOID, REDUCED DIPHTHERIA TOXOID AND ACELLULAR PERTUSSIS VACCINE, ADSORBED 0.5 ML: 5; 2.5; 8; 8; 2.5 SUSPENSION INTRAMUSCULAR at 23:39

## 2021-12-24 RX ADMIN — AMOXICILLIN AND CLAVULANATE POTASSIUM 1 TABLET: 875; 125 TABLET, FILM COATED ORAL at 23:39

## 2021-12-25 NOTE — ED PROVIDER NOTES
EMERGENCY DEPARTMENT HISTORY AND PHYSICAL EXAM    10:31 PM  Date: 12/24/2021  Patient Name: Yenifer Montana    History of Presenting Illness     Chief Complaint   Patient presents with    Dog Bite        History Provided By: Patient    HPI: Yenifer Montana is a 67 y.o. female with history multiple medical problems as below. Patient is presenting after she was attacked by her dog. Presenting with bilateral hand and forearm laceration. The patient states that she recently rescued the dog about 2 months ago. He weighs 15 pounds. This is the first time that he showed any signs of aggression. She went to pick him up to take him for a walk and without any provocative reason he attacked her. Dog is healthy and up-to-date on his vaccinations. Patient does not recall her last tetanus shot. Location:  Severity:  Timing/course:    Onset/Duration:     PCP: Armando Garvey MD    Past History     Past Medical History:  Past Medical History:   Diagnosis Date    Collagenous colitis 07/09/2020    Dr Damon Velazquez Degenerative arthritis of lumbar spine 04/2019    on mri multilevel foraminal and central stenosis    Diabetes mellitus ([facility] [address]     [facility]; on insulin pump    FHx: breast cancer     Hemorrhoids     Hyperlipidemia     Hypertension     Macular degeneration     and retinopathy Dr Capellan Miguel Ángel of both knees     Dr Robin Crews t score -1.2 spine, -2.1 hip (11/10) improved from 2007/2003;  1.7 spine, -2.1 hip (2/16); 2.8 spine, -2.2 hip (5/18) Sandra Barrett       Past Surgical History:  Past Surgical History:   Procedure Laterality Date    COLONOSCOPY N/A 2/4/2020    Dr. Nina Quiñones 2005 neg; Dr Sadiq Valle 9/15 neg; Dr Leny Tucker 2/20 collagenous colitis    HX CATARACT REMOVAL Bilateral 02/2001    Dr Chuckie Luna 1.7 spine, -2.1 hip 2/16 [facility]    US ABD COMP  8/02    negative       Family History:  Family History   Problem Relation Age of Onset    Cancer Mother         breast    Breast Cancer Mother        Social History:  Social History     Tobacco Use    Smoking status: Current Some Day Smoker     Packs/day: 0.50     Last attempt to quit: 1995     Years since quittin.3    Smokeless tobacco: Never Used   Substance Use Topics    Alcohol use: Yes     Alcohol/week: 0.8 standard drinks     Types: 1 Glasses of wine per week     Comment: nightly    Drug use: No       Allergies: Allergies   Allergen Reactions    Fosamax [Alendronate] Other (comments)     GI upset       Review of Systems   Review of Systems   Skin: Positive for wound. All other systems reviewed and are negative. Physical Exam     Patient Vitals for the past 12 hrs:   Temp Pulse Resp BP SpO2   21 2216 98.5 °F (36.9 °C) 92 18 (!) 180/96 95 %       Physical Exam  Vitals and nursing note reviewed. Constitutional:       General: She is not in acute distress. Appearance: Normal appearance. HENT:      Head: Normocephalic and atraumatic. Eyes:      Extraocular Movements: Extraocular movements intact. Cardiovascular:      Rate and Rhythm: Normal rate. Pulses: Normal pulses. Pulmonary:      Effort: Pulmonary effort is normal. No respiratory distress. Musculoskeletal:         General: Tenderness present. No deformity. Normal range of motion. Cervical back: Normal range of motion and neck supple. Skin:     General: Skin is warm and dry. Neurological:      General: No focal deficit present. Mental Status: She is alert and oriented to person, place, and time. Psychiatric:         Mood and Affect: Mood normal.         Behavior: Behavior normal.           Diagnostic Study Results     Labs -  No results found for this or any previous visit (from the past 12 hour(s)). Radiologic Studies -   No results found.       Medical Decision Making     ED Course: Progress Notes, Reevaluation, and Consults:    10:31 PM Initial assessment performed. The patients presenting problems have been discussed, and they/their family are in agreement with the care plan formulated and outlined with them. I have encouraged them to ask questions as they arise throughout their visit. Provider Notes (Medical Decision Making): 70-year-old female presenting with multiple hand and forearm laceration after dog bite. Well-appearing on exam and not in distress. Multiple superficial lacerations over the dorsal aspect of both right and left hand and the right forearm. No significant is a V-shaped laceration over the dorsum of the left hand with surrounding bruising and tenderness to palpation. X-ray was ordered to evaluate for any fractures and was negative per my interpretation. Wounds were irrigated and repaired with Steri-Strips. 1 wound was repaired with sutures. Will treat with Augmentin and update Tdap. Patient was provided with care instructions and strict return precautions. Verbalized understanding. Procedures: Wound Closure by Adhesive    Date/Time: 12/25/2021 1:50 AM  Performed by: Jc Guerra MD  Authorized by: Jc Guerra MD     Consent:     Consent obtained:  Verbal    Consent given by:  Patient    Risks discussed:  Infection, pain, poor cosmetic result and poor wound healing    Alternatives discussed:  Delayed treatment and referral  Anesthesia (see MAR for exact dosages):      Anesthesia method:  None  Laceration details:     Location:  Hand    Hand location:  R wrist    Length (cm):  1.5  Repair type:     Repair type:  Simple  Pre-procedure details:     Preparation:  Patient was prepped and draped in usual sterile fashion  Exploration:     Hemostasis achieved with:  Direct pressure    Wound exploration: wound explored through full range of motion      Contaminated: no    Treatment:     Area cleansed with:  Saline    Amount of cleaning:  Extensive    Irrigation solution:  Sterile saline    Irrigation method:  Pressure wash  Skin repair:     Repair method:  Steri-Strips  Approximation:     Approximation:  Close  Post-procedure details:     Dressing:  Open (no dressing)    Patient tolerance of procedure: Tolerated well, no immediate complications  Wound Closure by Adhesive    Date/Time: 12/25/2021 1:51 AM  Performed by: Neha Wu MD  Authorized by: Neha Wu MD     Consent:     Consent obtained:  Verbal    Consent given by:  Patient    Risks discussed:  Infection, poor cosmetic result and poor wound healing    Alternatives discussed:  Delayed treatment and referral  Anesthesia (see MAR for exact dosages): Anesthesia method:  None  Laceration details:     Location:  Hand    Hand location:  R hand, dorsum    Length (cm):  1  Repair type:     Repair type:  Simple  Exploration:     Hemostasis achieved with:  Direct pressure    Wound exploration: wound explored through full range of motion and entire depth of wound probed and visualized    Treatment:     Area cleansed with:  Saline    Amount of cleaning:  Extensive    Irrigation solution:  Sterile saline    Irrigation method:  Pressure wash  Skin repair:     Repair method:  Steri-Strips  Approximation:     Approximation:  Close  Post-procedure details:     Dressing:  Open (no dressing)    Patient tolerance of procedure: Tolerated well, no immediate complications  Wound Closure by Adhesive    Date/Time: 12/25/2021 1:52 AM  Performed by: Neha Wu MD  Authorized by: Neha Wu MD     Consent:     Consent obtained:  Verbal    Consent given by:  Patient    Risks discussed:  Infection, poor cosmetic result and poor wound healing    Alternatives discussed:  Delayed treatment and referral  Anesthesia (see MAR for exact dosages):      Anesthesia method:  None  Laceration details:     Location:  Shoulder/arm    Shoulder/arm location:  R lower arm    Length (cm):  1  Repair type:     Repair type:  Simple  Pre-procedure details:     Preparation:  Patient was prepped and draped in usual sterile fashion  Exploration:     Hemostasis achieved with:  Direct pressure    Wound exploration: wound explored through full range of motion      Contaminated: no    Treatment:     Area cleansed with:  Saline    Amount of cleaning:  Extensive    Irrigation solution:  Sterile saline    Irrigation method:  Pressure wash  Skin repair:     Repair method:  Steri-Strips  Approximation:     Approximation:  Close  Post-procedure details:     Dressing:  Open (no dressing)    Patient tolerance of procedure: Tolerated well, no immediate complications  Wound Repair    Date/Time: 12/25/2021 1:52 AM  Performed by: attendingPreparation: skin prepped with alcohol and sterile field established  Pre-procedure re-eval: Immediately prior to the procedure, the patient was reevaluated and found suitable for the planned procedure and any planned medications. Location details: left hand  Wound length:2.5 cm or less  Anesthesia: local infiltration    Anesthesia:  Local Anesthetic: lidocaine 1% without epinephrine  Foreign bodies: no foreign bodies  Irrigation solution: saline  Irrigation method: jet lavage  Debridement: none  Skin closure: Prolene  Number of sutures: 2  Technique: simple  Approximation: loose  Dressing: antibiotic ointment  Patient tolerance: patient tolerated the procedure well with no immediate complications  My total time at bedside, performing this procedure was 1-15 minutes. Wound Closure by Adhesive    Date/Time: 12/25/2021 1:54 AM  Performed by: Mark Jack MD  Authorized by: Mark Jack MD     Consent:     Consent obtained:  Verbal    Risks discussed:  Poor cosmetic result, poor wound healing and infection  Anesthesia (see MAR for exact dosages):      Anesthesia method:  None  Laceration details:     Location:  Hand    Hand location:  R hand, dorsum    Length (cm):  0.5  Repair type:     Repair type:  Simple  Pre-procedure details:     Preparation:  Patient was prepped and draped in usual sterile fashion  Exploration:     Hemostasis achieved with:  Direct pressure    Wound exploration: wound explored through full range of motion and entire depth of wound probed and visualized    Treatment:     Area cleansed with:  Saline    Amount of cleaning:  Standard    Irrigation solution:  Sterile saline    Irrigation method:  Pressure wash  Skin repair:     Repair method:  Steri-Strips  Approximation:     Approximation:  Close  Post-procedure details:     Patient tolerance of procedure: Tolerated well, no immediate complications        Critical Care Time:   Vital Signs-Reviewed the patient's vital signs. Reviewed pt's pulse ox reading. EKG: Interpreted by the EP. Time Interpreted:    Rate:    Rhythm:    Interpretation:   Comparison:     Records Reviewed: Nursing Notes (Time of Review: 10:31 PM)  -I am the first provider for this patient.  -I reviewed the vital signs, available nursing notes, past medical history, past surgical history, family history and social history. Current Outpatient Medications   Medication Sig Dispense Refill    levothyroxine (SYNTHROID) 50 mcg tablet Take 50 mcg by mouth daily.  calcium-cholecalciferol, d3, (CALCIUM 600 + D) 600-125 mg-unit tab Take  by mouth.  naproxen-diphenhydramine (ALEVE PM) 220-25 mg tab Take  by mouth nightly. PRN      melatonin 1 mg tablet Take  by mouth nightly.  multivit-min-FA-lycopen-lutein (CENTRUM SILVER) 0.4-300-250 mg-mcg-mcg tab Take  by mouth.  insulin lispro (HUMALOG) 100 unit/mL injection by Continuous Infusion route. On insulin pump managed by DI      evening primrose oil 500 mg cap Take 500 mg by mouth nightly.  raloxifene (EVISTA) 60 mg tablet Take  by mouth daily.  simvastatin (ZOCOR) 20 mg tablet Take  by mouth nightly.  lisinopril (PRINIVIL, ZESTRIL) 10 mg tablet Take  by mouth nightly. Clinical Impression     Clinical Impression: No diagnosis found.     Disposition: dc      This note was dictated utilizing voice recognition software which may lead to typographical errors. I apologize in advance if the situation occurs. If questions arise please do not hesitate to contact me or call our department.     Yessica Mcdermott MD  10:31 PM

## 2021-12-25 NOTE — ED NOTES
Patient comes into the ED with multiple dog bites to her left, and right hand as well as her right forearm. Wounds cleaned and irrigated with NS patted dry and awaiting xray before wrapping wounds.

## 2021-12-25 NOTE — ED TRIAGE NOTES
Alert and oriented female with dog bites to left and right hands and right forearm. Dog's immunizations are up-to-date. Last Tetanus unknown.

## 2022-01-03 ENCOUNTER — OFFICE VISIT (OUTPATIENT)
Dept: ORTHOPEDIC SURGERY | Age: 77
End: 2022-01-03
Payer: MEDICARE

## 2022-01-03 VITALS
WEIGHT: 150.6 LBS | RESPIRATION RATE: 16 BRPM | OXYGEN SATURATION: 97 % | BODY MASS INDEX: 28.43 KG/M2 | HEIGHT: 61 IN | HEART RATE: 74 BPM

## 2022-01-03 DIAGNOSIS — M54.16 LEFT LUMBAR RADICULITIS: ICD-10-CM

## 2022-01-03 DIAGNOSIS — M48.061 SPINAL STENOSIS OF LUMBAR REGION WITHOUT NEUROGENIC CLAUDICATION: Primary | ICD-10-CM

## 2022-01-03 DIAGNOSIS — M47.816 LUMBAR SPONDYLOSIS: ICD-10-CM

## 2022-01-03 DIAGNOSIS — M47.816 FACET ARTHRITIS OF LUMBAR REGION: ICD-10-CM

## 2022-01-03 PROCEDURE — G8536 NO DOC ELDER MAL SCRN: HCPCS | Performed by: PHYSICAL MEDICINE & REHABILITATION

## 2022-01-03 PROCEDURE — 1101F PT FALLS ASSESS-DOCD LE1/YR: CPT | Performed by: PHYSICAL MEDICINE & REHABILITATION

## 2022-01-03 PROCEDURE — G8399 PT W/DXA RESULTS DOCUMENT: HCPCS | Performed by: PHYSICAL MEDICINE & REHABILITATION

## 2022-01-03 PROCEDURE — G8427 DOCREV CUR MEDS BY ELIG CLIN: HCPCS | Performed by: PHYSICAL MEDICINE & REHABILITATION

## 2022-01-03 PROCEDURE — G8756 NO BP MEASURE DOC: HCPCS | Performed by: PHYSICAL MEDICINE & REHABILITATION

## 2022-01-03 PROCEDURE — 99213 OFFICE O/P EST LOW 20 MIN: CPT | Performed by: PHYSICAL MEDICINE & REHABILITATION

## 2022-01-03 PROCEDURE — 1090F PRES/ABSN URINE INCON ASSESS: CPT | Performed by: PHYSICAL MEDICINE & REHABILITATION

## 2022-01-03 PROCEDURE — G8432 DEP SCR NOT DOC, RNG: HCPCS | Performed by: PHYSICAL MEDICINE & REHABILITATION

## 2022-01-03 PROCEDURE — G8419 CALC BMI OUT NRM PARAM NOF/U: HCPCS | Performed by: PHYSICAL MEDICINE & REHABILITATION

## 2022-01-03 NOTE — PROGRESS NOTES
Called pt back to verify information on form and completed.  Form faxed to Tatum zoraida Treviño at [phone].      Pt states she now has new symptoms to report.  Started with small amount of pink tinged blood from vagina last evening.  Pt also notes she pulled a muscle while transferring to the toilet as pt is wheelchair bound about two weeks ago. She placed a Solonpas patch to back area for pain and then shortly after noted lower abdominal discomfort.  Pt states she is taking tylenol bid with some relief and rates pain at 3/10 pain scale.      Dr Ennis notified and can see pt today in clinic for further evaluation.  Pt provided with appt time and verbalized understanding of plan.    Kim Jones, RN, BSN, OCN      [address]  [address], [address]  [place] [address]  Phone: [phone]  Fax: [phone]      Patient: Alex Fonseca                                                                              MRN: [id number]        YOB: 1945          AGE: 67 y.o. PCP: Jyotsna Pedersen MD  Date:  01/03/22    Reason for Consultation: No chief complaint on file. HPI:  Alex Fonseca is a 67 y.o. female with relevant PMH of DM, HTN, osteoporosis who presented with low back pain with intermittent radiation down the left leg/posterior thigh. Symptoms began May 2021 without injury and she had severe 10/10 low back pain worse with turning in bed. Pain was very severe for several weeks and recently started to subside. She denies any prior back pain but did have an MRI of her lumbar spine in 2019 for further evaluation after a dexa scan. MRI demonstrates diffuse degenerative changes with moderate to severe spinal stenosis L2-3, and moderate to severe left foraminal stenosis at L4/5. Since her last visit she has completed a course of physical therapy. She found the physical therapy very helpful. She, admittedly, has not kept up with her HEP. Overall her pain is minimal.    She was bit by a dog 12/24/21 and had to sutures in her left hand. They are supposed to come out today but she would prefer avoiding going back to the ED with current covid conditions. Neurologic symptoms: No numbness, tingling, weakness, bowel or bladder changes. No recent falls but feels a bit off balance    Location: The pain is located in the low back   Radiation: The pain does radiate left posterior. Pain Score: Currently: 2/10  At Best: 0/10  At Worst: 4/10   Quality: Pain is of a Pulling quality. Aggravating: Pain is exacerbated by walking and standing, turning in bed  Alleviating:  The pain is alleviated by sitting    Prior Treatments:   Previous Medications:   Current Medications:Aleve  Previous work-up has included:   MRI Results (most recent):  Results from [place] encounter on 03/27/19    MRI LUMB SPINE WO CONT    Narrative  MR Lumbar spine without contrast    HISTORY: abn finding on DEXA/xray L4-5 area  -Additional information retrieved from the electronic chart: Office note  discusses \"hyperdensity in the L1-2 region \", identified on DEXA study of May  2018    COMPARISON: No prior imaging made available    Technique: Multi-sequence multiplanar T1, T2, STIR imaging with and without fat  saturation obtained centered on the lumbar spine. FINDINGS:    Alignment: Slight left convex and rotatory lumbar scoliosis  Vertebral body height: Slight anterolisthesis of L4 and L5. Mild retrolisthesis  of L1 on L2. Marrow signal: Generally fatty marrow. Moderate discogenic endplate edema  straddling L1-2, intermixed with sclerosis. Disc spaces: Diffuse disc desiccation. Severe narrowing of L1-2, essentially  obliteration of the disc space. Mild to moderate narrowing of L2-3 and L3-4,  less so L4-5 and L5-S1. Also moderately pronounced narrowing of lower thoracic  disc spaces, with disc desiccation. No high-grade spinal stenosis at those  levels, evaluated on sagittal imaging only. Conus: Terminates at L1-2    Axial imaging correlation:    T12-L1: Mild bilobed disc osteophyte complex formation. Bilateral facet  arthropathy. Patent canal and foramina. L1-2:  Broad-based disc osteophyte complex along with facet arthropathy. Moderate spinal stenosis. Moderate to severe foraminal stenoses, with  contribution from prominent marginal spondylosis. L2-3:  Bilobed broadly based disc bulge. More pronounced hypertrophic facet  arthropathy with ligamentum flavum thickening and buckling a moderate to severe  spinal stenosis. Moderate foraminal stenoses. L3-4: Uncovering of the disc. Mild broad-based disc bulge. Hypertrophic facet  arthropathy.  Moderate spinal stenosis. Moderate left foraminal stenosis. L4-5:  Uncovering of the disc severe hypertrophic facet arthropathy. Moderate  spinal stenosis. Moderate to severe left foraminal stenosis with distortion of  perineural fat, sagittal image 4. Mild to moderate right foraminal stenosis. L5-S1:  Slight uncovering of the disc bilateral facet arthropathy. Minor disc  bulge. Small extruding synovial cyst from the right facet articulation  impressing upon the right lateral thecal sac. No significant spinal stenosis. However, there is some lateral recess stenosis with mild posterior displacement  of the crossing S1 nerves as on axial image 9. Mild to moderate foraminal  stenoses. Other structures: A few small T2 bright cystic foci of the kidneys. Impression  IMPRESSION:    1. Multilevel degenerative findings along the lumbar spine and within the lower  thoracic spine  - Most advanced degenerative disc disease is at L1-2. There is a mixture of  discogenic endplate edema and sclerosis, the latter the presumed etiology of  reported DEXA finding. No concerning features. - Up to moderate spinal stenosis, most significant at L2-3 followed by L3-4 as  delineated above  -Several levels of foraminal stenoses; includes potential for exiting left L3  and L4 nerves at their respective foramina. Thank you for enabling us to participate in the care of this patient.       Past Medical History:   Past Medical History:   Diagnosis Date    Collagenous colitis 07/09/2020    Dr Luis Alberto Oneal Degenerative arthritis of lumbar spine 04/2019    on mri multilevel foraminal and central stenosis    Diabetes mellitus ([facility] [address]     [facility]; on insulin pump    FHx: breast cancer     Hemorrhoids     Hyperlipidemia     Hypertension     Macular degeneration     and retinopathy Dr Limmie Galeazzi of both knees     Dr Kaz Barillas t score -1.2 spine, -2.1 hip (11/10) improved from ;  1.7 spine, -2.1 hip (); 2.8 spine, -2.2 hip () [facility]      Past Surgical History:   Past Surgical History:   Procedure Laterality Date    COLONOSCOPY N/A 2020    Dr. Jaydon Ross  neg; Dr Brady Torres 9/15 neg; Dr Vee Brody  collagenous colitis    HX CATARACT REMOVAL Bilateral 2001    Dr Heather Jacobs 1.7 spine, -2.1 hip  [facility]    US ABD COMP      negative      SocHx:   Social History     Tobacco Use    Smoking status: Current Some Day Smoker     Packs/day: 0.50     Last attempt to quit: 1995     Years since quittin.3    Smokeless tobacco: Never Used   Substance Use Topics    Alcohol use: Yes     Alcohol/week: 0.8 standard drinks     Types: 1 Glasses of wine per week     Comment: nightly      FamHx:? Family History   Problem Relation Age of Onset    Cancer Mother         breast    Breast Cancer Mother        Current Medications:    Current Outpatient Medications   Medication Sig Dispense Refill    levothyroxine (SYNTHROID) 50 mcg tablet Take 50 mcg by mouth daily.  calcium-cholecalciferol, d3, (CALCIUM 600 + D) 600-125 mg-unit tab Take  by mouth.  melatonin 1 mg tablet Take  by mouth nightly.  multivit-min-FA-lycopen-lutein (CENTRUM SILVER) 0.4-300-250 mg-mcg-mcg tab Take  by mouth.  insulin lispro (HUMALOG) 100 unit/mL injection by Continuous Infusion route. On insulin pump managed by DI      evening primrose oil 500 mg cap Take 500 mg by mouth nightly.  raloxifene (EVISTA) 60 mg tablet Take  by mouth daily.  simvastatin (ZOCOR) 20 mg tablet Take  by mouth nightly.  lisinopril (PRINIVIL, ZESTRIL) 10 mg tablet Take 20 mg by mouth nightly.  naproxen-diphenhydramine (ALEVE PM) 220-25 mg tab Take  by mouth nightly. PRN        Allergies:     Allergies   Allergen Reactions    Fosamax [Alendronate] Other (comments)     GI upset        Review of Systems:   Gen:    Denied fevers, chills, malaise, fatigue, weight changes   Resp: Denied shortness of breath, cough, wheezing   CVS: Denied chest pain, palpitations   : Denied urinary urgency, frequency, incontinence   GI: Denied nausea, vomiting, constipation, diarrhea   Skin: Denied rashes, wounds   Psych: Denied anxiety, depression   Vasc: Denied claudication, ulcers   Hem: Denied easy bruising/bleeding   MSK: See HPI   Neuro: See HPI         Physical Exam     Vital Signs:   Visit Vitals  Pulse 74   Resp 16   Ht 5' 1\" (1.549 m)   Wt 150 lb 9.6 oz (68.3 kg)   SpO2 97%   BMI 28.46 kg/m²      General: ??????? Well nourished and well developed female without any acute distress   Psychiatric: ?  Alert and oriented x 3 with normal mood    HEENT: ???????? Atraumatic   Respiratory:   Breathing non-labored and non dyspneic   CV: ???????????????? Peripheral pulses intact, no peripheral edema   Skin: ????????????? No rashes       Neurologic: ?? Sensation: normal and grossly intact thebilateral, lower extremity(s) e  Strength: 5/5 in the bilateral, lower extremity(s)   Reflexes: reveals 2+ symmetric DTRs throughout LE except absent b/l achilles*   Gait: normal and difficulty with tandem gait       Musculoskeletal: Lumbar Exam     Inspection:   Alignment: Normal  Atrophy: None       Tenderness to Palpation:   Lumbar paraspinals Negative  Lumbar spinous processes Negative  SI Joint:  Negative  Gluteal:Negative   Greater trochanter: Negative  IT Band:Negative    ROM:   Lumbar ROM: Abnormal mild pain with extension, no pain with flexion  Lumbar facet loading: Positive  Hip ROM: No reproduction of pain with movement     Special Tests      Slump test: Negative  SLR: Negative  RADHA: Negative  FADIR: Negative  Log Roll: Negative             Medical Decision Making:    Images: The imaging results as well as the actual images of the studies below were reviewed and visualized.    MRI lumbar spine 2019-  Diffuse facet arthropathy  Spinal stenosis most severe at L2-3, L4/5  Foraminal narrowing most severe left L4/5   Lateral recess narrowing b/l L5/S1     Assessment:   1. Lumbar spinal stenosis  2. Lumbar facet arthritis   3. Left L4 radiculitis   Plan:      -Physical therapy - encouraged to continue HEP   -Medications - NA,  Consider gabapentin if pain is more severe Counseled regarding side effects and appropriate administration of medications.    -Diagnostics/Imaging  NA  -Injections - Consider SOHA  -Lifestyle -Encouraged regular aerobic exercise  -Education - The patient's diagnosis, prognosis and treatment options were discussed today. All questions were answered. Discussed urgent symptoms and signs to watch for  F/U - in 8 week(s) or sooner if needed.   Consider SOHA or trial of gabapetin   -Removed sutures today from left hand 2 sutures- wound closed no drainage        [facility] [address] [facility]

## 2022-01-07 ENCOUNTER — HOSPITAL ENCOUNTER (OUTPATIENT)
Dept: MAMMOGRAPHY | Age: 77
Discharge: HOME | End: 2022-01-07
Attending: INTERNAL MEDICINE
Payer: MEDICARE

## 2022-01-07 DIAGNOSIS — Z12.31 ENCOUNTER FOR SCREENING MAMMOGRAM FOR MALIGNANT NEOPLASM OF BREAST: ICD-10-CM

## 2022-01-07 PROCEDURE — 77063 BREAST TOMOSYNTHESIS BI: CPT

## 2022-01-10 ENCOUNTER — TELEPHONE (OUTPATIENT)
Dept: INTERNAL MEDICINE CLINIC | Age: 77
End: 2022-01-10

## 2022-01-10 DIAGNOSIS — G47.00 INSOMNIA, UNSPECIFIED TYPE: Primary | ICD-10-CM

## 2022-01-10 RX ORDER — ZOLPIDEM TARTRATE 5 MG/1
5 TABLET ORAL
Qty: 30 TABLET | Refills: 0 | Status: SHIPPED | OUTPATIENT
Start: 2022-01-10 | End: 2022-02-14 | Stop reason: SDUPTHER

## 2022-01-10 NOTE — TELEPHONE ENCOUNTER
Pt is requesting a sleeping pill be sent in. Said she is going away for about a month and she doesn't want to disturb others when she can't sleep.     Please advise her at [phone]

## 2022-02-14 DIAGNOSIS — G47.00 INSOMNIA, UNSPECIFIED TYPE: ICD-10-CM

## 2022-02-14 RX ORDER — ZOLPIDEM TARTRATE 5 MG/1
5 TABLET ORAL
Qty: 30 TABLET | Refills: 0 | Status: SHIPPED | OUTPATIENT
Start: 2022-02-14 | End: 2022-05-19

## 2022-02-14 NOTE — TELEPHONE ENCOUNTER
Patient is on vacation in [place] and is requesting a refill to be sent there ASAP    Last Visit: 8/19/21 with MD Ashley Castanon  Next Appointment: 8/25/22 with MD Ashley Castanon  Previous Refill Encounter(s): 1/10/22 #30    Requested Prescriptions     Pending Prescriptions Disp Refills    zolpidem (AMBIEN) 5 mg tablet 30 Tablet 0     Sig: Take 1 Tablet by mouth nightly as needed for Sleep. Max Daily Amount: 5 mg.

## 2022-02-15 ENCOUNTER — TELEPHONE (OUTPATIENT)
Dept: INTERNAL MEDICINE CLINIC | Age: 77
End: 2022-02-15

## 2022-03-19 PROBLEM — K52.831 COLLAGENOUS COLITIS: Status: ACTIVE | Noted: 2020-07-09

## 2022-03-20 PROBLEM — E11.3599 PROLIFERATIVE DIABETIC RETINOPATHY ASSOCIATED WITH TYPE 2 DIABETES MELLITUS (HCC): Status: ACTIVE | Noted: 2018-03-22

## 2022-05-19 ENCOUNTER — HOSPITAL ENCOUNTER (EMERGENCY)
Age: 77
Discharge: HOME | End: 2022-05-19
Attending: EMERGENCY MEDICINE
Payer: MEDICARE

## 2022-05-19 ENCOUNTER — APPOINTMENT (OUTPATIENT)
Dept: GENERAL RADIOLOGY | Age: 77
End: 2022-05-19
Attending: EMERGENCY MEDICINE
Payer: MEDICARE

## 2022-05-19 VITALS
BODY MASS INDEX: 28.32 KG/M2 | SYSTOLIC BLOOD PRESSURE: 111 MMHG | HEIGHT: 61 IN | WEIGHT: 150 LBS | DIASTOLIC BLOOD PRESSURE: 81 MMHG | TEMPERATURE: 98.8 F | HEART RATE: 92 BPM | OXYGEN SATURATION: 95 % | RESPIRATION RATE: 20 BRPM

## 2022-05-19 DIAGNOSIS — R06.00 DYSPNEA, UNSPECIFIED TYPE: ICD-10-CM

## 2022-05-19 DIAGNOSIS — J20.9 ACUTE BRONCHITIS WITH BRONCHOSPASM: Primary | ICD-10-CM

## 2022-05-19 LAB
FLUAV AG NPH QL IA: NEGATIVE
FLUBV AG NOSE QL IA: NEGATIVE
GLUCOSE BLD STRIP.AUTO-MCNC: 139 MG/DL (ref 70–110)
SARS-COV-2, COV2: NORMAL
SARS-COV-2, NAA: NOT DETECTED

## 2022-05-19 PROCEDURE — 74011000250 HC RX REV CODE- 250: Performed by: EMERGENCY MEDICINE

## 2022-05-19 PROCEDURE — 71046 X-RAY EXAM CHEST 2 VIEWS: CPT

## 2022-05-19 PROCEDURE — 74011250637 HC RX REV CODE- 250/637: Performed by: EMERGENCY MEDICINE

## 2022-05-19 PROCEDURE — 87804 INFLUENZA ASSAY W/OPTIC: CPT

## 2022-05-19 PROCEDURE — 99284 EMERGENCY DEPT VISIT MOD MDM: CPT

## 2022-05-19 PROCEDURE — 82962 GLUCOSE BLOOD TEST: CPT

## 2022-05-19 PROCEDURE — 94640 AIRWAY INHALATION TREATMENT: CPT

## 2022-05-19 PROCEDURE — U0003 INFECTIOUS AGENT DETECTION BY NUCLEIC ACID (DNA OR RNA); SEVERE ACUTE RESPIRATORY SYNDROME CORONAVIRUS 2 (SARS-COV-2) (CORONAVIRUS DISEASE [COVID-19]), AMPLIFIED PROBE TECHNIQUE, MAKING USE OF HIGH THROUGHPUT TECHNOLOGIES AS DESCRIBED BY CMS-2020-01-R: HCPCS

## 2022-05-19 RX ORDER — BENZONATATE 100 MG/1
100 CAPSULE ORAL
Qty: 30 CAPSULE | Refills: 0 | Status: SHIPPED | OUTPATIENT
Start: 2022-05-19 | End: 2022-05-26

## 2022-05-19 RX ORDER — METHYLPREDNISOLONE 4 MG/1
TABLET ORAL
Qty: 1 DOSE PACK | Refills: 0 | Status: SHIPPED | OUTPATIENT
Start: 2022-05-19 | End: 2022-08-25 | Stop reason: ALTCHOICE

## 2022-05-19 RX ORDER — ALBUTEROL SULFATE 90 UG/1
2 AEROSOL, METERED RESPIRATORY (INHALATION)
Qty: 1 EACH | Refills: 0 | Status: SHIPPED | OUTPATIENT
Start: 2022-05-19 | End: 2022-05-24

## 2022-05-19 RX ORDER — IPRATROPIUM BROMIDE AND ALBUTEROL SULFATE 2.5; .5 MG/3ML; MG/3ML
3 SOLUTION RESPIRATORY (INHALATION)
Status: COMPLETED | OUTPATIENT
Start: 2022-05-19 | End: 2022-05-19

## 2022-05-19 RX ORDER — DOXYCYCLINE HYCLATE 100 MG
100 TABLET ORAL 2 TIMES DAILY
Qty: 14 TABLET | Refills: 0 | Status: SHIPPED | OUTPATIENT
Start: 2022-05-19 | End: 2022-05-26

## 2022-05-19 RX ORDER — BENZONATATE 100 MG/1
100 CAPSULE ORAL
Status: COMPLETED | OUTPATIENT
Start: 2022-05-19 | End: 2022-05-19

## 2022-05-19 RX ADMIN — IPRATROPIUM BROMIDE AND ALBUTEROL SULFATE 3 ML: .5; 2.5 SOLUTION RESPIRATORY (INHALATION) at 10:10

## 2022-05-19 RX ADMIN — BENZONATATE 100 MG: 100 CAPSULE ORAL at 10:09

## 2022-05-19 NOTE — ED PROVIDER NOTES
EMERGENCY DEPARTMENT HISTORY AND PHYSICAL EXAM    9:46 AM      Date: 5/19/2022  Patient Name: John Hughes    History of Presenting Illness     Chief Complaint   Patient presents with    Cough         History Provided By: Patient  Location/Duration/Severity/Modifying factors   Patient is a 67-year-old female with a history of hypertension, diabetes, smoking, regular alcohol use, the presents emergency department with complaint of approximately 1 week of cough with nasal drainage now with increasing shortness of breath the last 48 hours. Patient notes she could not sleep last night because she was coughing and the cough is making it difficult for her to breathe. Patient said she had COVID before and said when she had COVID and her shortness of breath was not this bad. Patient denies any sick contacts. Patient denies any fevers or chills. Patient denies a productive cough. Patient denies any leg swelling. Patient denies any recent air travel and is retired. There are no other known aggravating or alleviating factors. Patient is a smoker, drinks 1 alcoholic drink a day, and denies any drug use, patient is retired . PCP: John Mayfield MD    Current Outpatient Medications   Medication Sig Dispense Refill    albuterol (PROVENTIL HFA, VENTOLIN HFA, PROAIR HFA) 90 mcg/actuation inhaler Take 2 Puffs by inhalation every four (4) hours as needed for Wheezing or Shortness of Breath for up to 5 days. 1 Each 0    benzonatate (Tessalon Perles) 100 mg capsule Take 1 Capsule by mouth three (3) times daily as needed for Cough for up to 7 days. 30 Capsule 0    doxycycline (VIBRA-TABS) 100 mg tablet Take 1 Tablet by mouth two (2) times a day for 7 days. 14 Tablet 0    methylPREDNISolone (Medrol, Jose,) 4 mg tablet As directed 1 Dose Pack 0    levothyroxine (SYNTHROID) 50 mcg tablet Take 50 mcg by mouth daily.       calcium-cholecalciferol, d3, (CALCIUM 600 + D) 600-125 mg-unit tab Take by mouth.  melatonin 1 mg tablet Take  by mouth nightly.  multivit-min-FA-lycopen-lutein (CENTRUM SILVER) 0.4-300-250 mg-mcg-mcg tab Take  by mouth.  insulin lispro (HumaLOG U-100 Insulin) 100 unit/mL injection by Continuous Infusion route. On insulin pump managed by DI       evening primrose oil 500 mg cap Take 500 mg by mouth nightly.  raloxifene (EVISTA) 60 mg tablet Take  by mouth daily.  simvastatin (ZOCOR) 20 mg tablet Take  by mouth nightly.  lisinopril (PRINIVIL, ZESTRIL) 10 mg tablet Take 20 mg by mouth nightly.          Past History     Past Medical History:  Past Medical History:   Diagnosis Date    Collagenous colitis 2020    Dr Aleena Encarnacion Degenerative arthritis of lumbar spine 2019    on mri multilevel foraminal and central stenosis    Diabetes mellitus ([facility] [address]     [facility]; on insulin pump    FHx: breast cancer     Hemorrhoids     Hyperlipidemia     Hypertension     Macular degeneration     and retinopathy Dr Julita Castelan of both knees     Dr Zeenat Funes t score -1.2 spine, -2.1 hip (11/10) improved from ;  1.7 spine, -2.1 hip (); 2.8 spine, -2.2 hip () Tobias Dejesus       Past Surgical History:  Past Surgical History:   Procedure Laterality Date    COLONOSCOPY N/A 2020    Dr. Richy South 2005 neg; Dr Keenan Rosario 9/15 neg; Dr Valeria Pace  collagenous colitis    HX CATARACT REMOVAL Bilateral 2001    Dr Mandeep Giudry 1.7 spine, -2.1 hip  [facility]    US ABD COMP      negative       Family History:  Family History   Problem Relation Age of Onset    Cancer Mother         breast    Breast Cancer Mother        Social History:  Social History     Tobacco Use    Smoking status: Current Some Day Smoker     Packs/day: 0.50     Last attempt to quit: 1995     Years since quittin.7    Smokeless tobacco: Never Used   Substance Use Topics    Alcohol use: Yes     Alcohol/week: 0.8 standard drinks     Types: 1 Glasses of wine per week     Comment: nightly    Drug use: No       Allergies: Allergies   Allergen Reactions    Fosamax [Alendronate] Other (comments)     GI upset         Review of Systems       Review of Systems   Constitutional: Negative for activity change, fatigue and fever. HENT: Positive for rhinorrhea. Negative for congestion. Eyes: Negative for visual disturbance. Respiratory: Positive for cough and shortness of breath. Cardiovascular: Negative for chest pain and palpitations. Gastrointestinal: Negative for abdominal pain, diarrhea, nausea and vomiting. Genitourinary: Negative for dysuria and hematuria. Musculoskeletal: Negative for back pain. Skin: Negative for rash. Neurological: Negative for dizziness, weakness and light-headedness. All other systems reviewed and are negative. Physical Exam     Visit Vitals  /81 (BP 1 Location: Left upper arm, BP Patient Position: At rest)   Pulse 92   Temp 98.8 °F (37.1 °C)   Resp 20   Ht 5' 1\" (1.549 m)   Wt 68 kg (150 lb)   SpO2 95%   BMI 28.34 kg/m²         Physical Exam  Vitals and nursing note reviewed. Constitutional:       General: She is not in acute distress. Appearance: She is well-developed. Comments: Tachypnea at rest   HENT:      Head: Normocephalic and atraumatic. Right Ear: External ear normal.      Left Ear: External ear normal.      Nose: Rhinorrhea present. Eyes:      General: No scleral icterus. Conjunctiva/sclera: Conjunctivae normal.      Pupils: Pupils are equal, round, and reactive to light. Neck:      Thyroid: No thyromegaly. Vascular: No JVD. Trachea: No tracheal deviation. Cardiovascular:      Rate and Rhythm: Normal rate and regular rhythm. Heart sounds: Normal heart sounds. No murmur heard. No friction rub. No gallop. Pulmonary:      Effort: Pulmonary effort is normal.      Breath sounds: Rhonchi present. Chest:      Chest wall: No tenderness. Abdominal:      General: Bowel sounds are normal. There is no distension. Palpations: Abdomen is soft. Tenderness: There is no abdominal tenderness. There is no guarding or rebound. Musculoskeletal:         General: No tenderness. Normal range of motion. Cervical back: Normal range of motion and neck supple. Lymphadenopathy:      Cervical: No cervical adenopathy. Skin:     General: Skin is warm and dry. Neurological:      Mental Status: She is alert and oriented to person, place, and time. Cranial Nerves: No cranial nerve deficit. Coordination: Coordination normal.      Comments: No sensory loss, Gait normal, Motor 5/5   Psychiatric:         Behavior: Behavior normal.         Thought Content: Thought content normal.         Judgment: Judgment normal.           Diagnostic Study Results     Labs -  Recent Results (from the past 12 hour(s))   SARS-COV-2    Collection Time: 05/19/22 10:10 AM   Result Value Ref Range    SARS-CoV-2 by PCR Nasopharyngeal     INFLUENZA A & B AG (RAPID TEST)    Collection Time: 05/19/22 10:10 AM   Result Value Ref Range    Influenza A Antigen Negative NEG      Influenza B Antigen Negative NEG     GLUCOSE, POC    Collection Time: 05/19/22 10:12 AM   Result Value Ref Range    Glucose (POC) 139 (H) 70 - 110 mg/dL       Radiologic Studies -   XR CHEST PA LAT   Final Result   1. No acute infiltrate or effusion. Medical Decision Making   I am the first provider for this patient. I reviewed the vital signs, available nursing notes, past medical history, past surgical history, family history and social history. Vital Signs-Reviewed the patient's vital signs. Records Reviewed: Nursing Notes, Old Medical Records, Previous Radiology Studies and Previous Laboratory Studies (Time of Review: 9:46 AM)    ED Course: Progress Notes, Reevaluation, and Consults:      The patient is feeling much better and resting comfortably. The patient is on insulin pump and knows how to adjust her insulin as needed for her blood sugar. Patient said the nebulizer and the Tessalon helped. We will start the patient on a steroid taper, discharged with albuterol, doxycycline as she may have some postnasal drip from her sinuses, Tessalon, and have her follow her COVID test closely. If her COVID test is positive I discussed with her she may be a candidate for interventions as she should talk to her primary doctor about them. The patient will return if at all worsened or concerned. Workup and recommendations were reviewed with the patient and all questions were answered. The patient understands the plan and will proceed with close outpatient care. I have encouraged the patient to return if at all worsened or concerned. Duane Lowry DO 12:02 PM      Provider Notes (Medical Decision Making):   MDM  Number of Diagnoses or Management Options  Diagnosis management comments: Patient is a 76-year-old female with a history of hypertension, insulin-dependent diabetes, smoking, regular alcohol use, who presents emergency department with 1 week of cough that is worsened with increasing rhinorrhea and difficulty sleeping due to cough. Patient has 95% saturation, mildly tachypneic on my exam, will follow patient chest x-ray, duo nebs, monitor blood glucose, Tessalon, COVID and flu test, and then reevaluate. Duane Lowry DO 9:48 AM        Procedures        Diagnosis     Clinical Impression:   1. Acute bronchitis with bronchospasm    2.  Dyspnea, unspecified type        Disposition: DC    Follow-up Information     Follow up With Specialties Details Why Contact Info    Kiley Dooley MD Internal Medicine Physician In 1 day  [address] [facility] [place] [address] [phone] [id number] [facility] EMERGENCY DEPT Emergency Medicine  As needed, If symptoms worsen [address] [facility]  [phone] Patient's Medications   Start Taking    ALBUTEROL (PROVENTIL HFA, VENTOLIN HFA, PROAIR HFA) 90 MCG/ACTUATION INHALER    Take 2 Puffs by inhalation every four (4) hours as needed for Wheezing or Shortness of Breath for up to 5 days. BENZONATATE (TESSALON PERLES) 100 MG CAPSULE    Take 1 Capsule by mouth three (3) times daily as needed for Cough for up to 7 days. DOXYCYCLINE (VIBRA-TABS) 100 MG TABLET    Take 1 Tablet by mouth two (2) times a day for 7 days. METHYLPREDNISOLONE (MEDROL, DORIS,) 4 MG TABLET    As directed   Continue Taking    CALCIUM-CHOLECALCIFEROL, D3, (CALCIUM 600 + D) 600-125 MG-UNIT TAB    Take  by mouth. EVENING PRIMROSE  MG CAP    Take 500 mg by mouth nightly. INSULIN LISPRO (HUMALOG U-100 INSULIN) 100 UNIT/ML INJECTION    by Continuous Infusion route. On insulin pump managed by DI     LEVOTHYROXINE (SYNTHROID) 50 MCG TABLET    Take 50 mcg by mouth daily. LISINOPRIL (PRINIVIL, ZESTRIL) 10 MG TABLET    Take 20 mg by mouth nightly. MELATONIN 1 MG TABLET    Take  by mouth nightly. MULTIVIT-MIN-FA-LYCOPEN-LUTEIN (CENTRUM SILVER) 0.4-300-250 MG-MCG-MCG TAB    Take  by mouth. RALOXIFENE (EVISTA) 60 MG TABLET    Take  by mouth daily. SIMVASTATIN (ZOCOR) 20 MG TABLET    Take  by mouth nightly. These Medications have changed    No medications on file   Stop Taking    NAPROXEN-DIPHENHYDRAMINE (ALEVE PM) 220-25 MG TAB    Take  by mouth nightly. PRN    ZOLPIDEM (AMBIEN) 5 MG TABLET    Take 1 Tablet by mouth nightly as needed for Sleep. Max Daily Amount: 5 mg. Disclaimer: Sections of this note are dictated using utilizing voice recognition software. Minor typographical errors may be present. If questions arise, please do not hesitate to contact me or call our department.

## 2022-05-19 NOTE — DISCHARGE INSTRUCTIONS
Make sure to follow your blood sugar closely while taking the steroids. Also keep a look out for your COVID test.  If your COVID test is positive you may qualify for interventions and be sure discuss them with your primary doctor. Please return if you are at all worsened or concerned.

## 2022-05-19 NOTE — ED TRIAGE NOTES
Patient c/o persistent cough \"night and day\" x 4 days. She c/o fatigue that she associates with constant cough. Denies COPD or Asthma hx.

## 2022-07-19 DIAGNOSIS — G47.00 INSOMNIA, UNSPECIFIED TYPE: ICD-10-CM

## 2022-07-20 RX ORDER — ZOLPIDEM TARTRATE 5 MG/1
TABLET ORAL
Qty: 30 TABLET | Refills: 1 | Status: SHIPPED | OUTPATIENT
Start: 2022-07-20

## 2022-08-18 ENCOUNTER — HOSPITAL ENCOUNTER (OUTPATIENT)
Dept: LAB | Age: 77
Discharge: HOME | End: 2022-08-18
Payer: MEDICARE

## 2022-08-18 ENCOUNTER — APPOINTMENT (OUTPATIENT)
Dept: INTERNAL MEDICINE CLINIC | Age: 77
End: 2022-08-18

## 2022-08-18 DIAGNOSIS — E08.21 DIABETES MELLITUS DUE TO UNDERLYING CONDITION WITH DIABETIC NEPHROPATHY, WITHOUT LONG-TERM CURRENT USE OF INSULIN (HCC): ICD-10-CM

## 2022-08-18 DIAGNOSIS — I10 PRIMARY HYPERTENSION: ICD-10-CM

## 2022-08-18 LAB
ALBUMIN SERPL-MCNC: 3.5 G/DL (ref 3.4–5)
ALBUMIN/GLOB SERPL: 1 {RATIO} (ref 0.8–1.7)
ALP SERPL-CCNC: 82 U/L (ref 45–117)
ALT SERPL-CCNC: 19 U/L (ref 13–56)
ANION GAP SERPL CALC-SCNC: 5 MMOL/L (ref 3–18)
AST SERPL-CCNC: 17 U/L (ref 10–38)
BILIRUB SERPL-MCNC: 0.5 MG/DL (ref 0.2–1)
BUN SERPL-MCNC: 14 MG/DL (ref 7–18)
BUN/CREAT SERPL: 18 (ref 12–20)
CALCIUM SERPL-MCNC: 9.1 MG/DL (ref 8.5–10.1)
CHLORIDE SERPL-SCNC: 108 MMOL/L (ref 100–111)
CHOLEST SERPL-MCNC: 147 MG/DL
CO2 SERPL-SCNC: 28 MMOL/L (ref 21–32)
CREAT SERPL-MCNC: 0.78 MG/DL (ref 0.6–1.3)
CREAT UR-MCNC: 101 MG/DL (ref 30–125)
ERYTHROCYTE [DISTWIDTH] IN BLOOD BY AUTOMATED COUNT: 14.5 % (ref 11.6–14.5)
EST. AVERAGE GLUCOSE BLD GHB EST-MCNC: 163 MG/DL
GLOBULIN SER CALC-MCNC: 3.5 G/DL (ref 2–4)
GLUCOSE SERPL-MCNC: 133 MG/DL (ref 74–99)
HBA1C MFR BLD: 7.3 % (ref 4.2–5.6)
HCT VFR BLD AUTO: 42.4 % (ref 35–45)
HDLC SERPL-MCNC: 77 MG/DL (ref 40–60)
HDLC SERPL: 1.9 {RATIO} (ref 0–5)
HGB BLD-MCNC: 13.9 G/DL (ref 12–16)
LDLC SERPL CALC-MCNC: 52.4 MG/DL (ref 0–100)
LIPID PROFILE,FLP: ABNORMAL
MCH RBC QN AUTO: 32.6 PG (ref 24–34)
MCHC RBC AUTO-ENTMCNC: 32.8 G/DL (ref 31–37)
MCV RBC AUTO: 99.3 FL (ref 78–100)
MICROALBUMIN UR-MCNC: 1.11 MG/DL (ref 0–3)
MICROALBUMIN/CREAT UR-RTO: 11 MG/G (ref 0–30)
NRBC # BLD: 0 K/UL (ref 0–0.01)
NRBC BLD-RTO: 0 PER 100 WBC
PLATELET # BLD AUTO: 322 K/UL (ref 135–420)
PMV BLD AUTO: 10 FL (ref 9.2–11.8)
POTASSIUM SERPL-SCNC: 4.2 MMOL/L (ref 3.5–5.5)
PROT SERPL-MCNC: 7 G/DL (ref 6.4–8.2)
RBC # BLD AUTO: 4.27 M/UL (ref 4.2–5.3)
SODIUM SERPL-SCNC: 141 MMOL/L (ref 136–145)
TRIGL SERPL-MCNC: 88 MG/DL (ref ?–150)
VLDLC SERPL CALC-MCNC: 17.6 MG/DL
WBC # BLD AUTO: 7.4 K/UL (ref 4.6–13.2)

## 2022-08-18 PROCEDURE — 80061 LIPID PANEL: CPT

## 2022-08-18 PROCEDURE — 80053 COMPREHEN METABOLIC PANEL: CPT

## 2022-08-18 PROCEDURE — 36415 COLL VENOUS BLD VENIPUNCTURE: CPT

## 2022-08-18 PROCEDURE — 82043 UR ALBUMIN QUANTITATIVE: CPT

## 2022-08-18 PROCEDURE — 85027 COMPLETE CBC AUTOMATED: CPT

## 2022-08-18 PROCEDURE — 83036 HEMOGLOBIN GLYCOSYLATED A1C: CPT

## 2022-08-18 NOTE — PROGRESS NOTES
This is a Subsequent Medicare Annual Wellness Visit      I have reviewed the patient's medical history in detail and updated the computerized patient record. Assessment/Plan   Education and counseling provided:  Are appropriate based on today's review and evaluation  End-of-Life planning (with patient's consent)  Influenza Vaccine  Screening Mammography  Cardiovascular screening blood test  Bone mass measurement (DEXA)  Diabetes screening test    1. Medicare annual wellness visit, subsequent  2. Diabetes mellitus due to underlying condition with diabetic nephropathy, without long-term current use of insulin (HCC)  -     CBC W/O DIFF; Future  -     METABOLIC PANEL, COMPREHENSIVE; Future  -     MICROALBUMIN, UR, RAND W/ MICROALB/CREAT RATIO; Future  -     LIPID PANEL; Future  -     HEMOGLOBIN A1C WITH EAG; Future  -      DIABETES FOOT EXAM  3. Primary hypertension  4. Hyperlipidemia, unspecified hyperlipidemia type  5. Collagenous colitis  6. Stable proliferative diabetic retinopathy associated with type 2 diabetes mellitus, unspecified laterality ([facility] [address]  7. Advanced directives, counseling/discussion  -     ADVANCE CARE PLANNING FIRST 30 MINS       Depression Risk Factor Screening     3 most recent PHQ Screens 8/25/2022   Little interest or pleasure in doing things Not at all   Feeling down, depressed, irritable, or hopeless Not at all   Total Score PHQ 2 0       Alcohol Risk Screen    Do you average more than 1 drink per night or more than 7 drinks a week:  No    On any one occasion in the past three months have you have had more than 3 drinks containing alcohol:  No        Functional Ability and Level of Safety    Hearing: Hearing is good. Activities of Daily Living: The home contains: no safety equipment. Patient does total self care      Ambulation: with no difficulty     Fall Risk:  Fall Risk Assessment, last 12 mths 8/25/2022   Able to walk? Yes   Fall in past 12 months? 0   Do you feel unsteady?  1 Are you worried about falling 1   Is TUG test greater than 12 seconds? -   Is the gait abnormal? -   Number of falls in past 12 months -   Fall with injury? -      Abuse Screen:  Patient is not abused       Cognitive Screening    Has your family/caregiver stated any concerns about your memory: no     Cognitive Screening: Normal - Mini Cog Test    Health Maintenance Due     There are no preventive care reminders to display for this patient.       Patient Care Team   Patient Care Team:  Ashu Irving MD as PCP - General (Internal Medicine Physician)  Ashu Irving MD as PCP - [facility] Empaneled Provider  Kunal Moy MD (Physical Medicine and Rehabilitation Physician)    History     Patient Active Problem List   Diagnosis Code    Diabetes mellitus on insulin pump Dr. Sonja Alcaraz E11.9    Osteopenia M85.80    Hyperlipidemia E78.5    Primary hypertension I10    Proliferative diabetic retinopathy associated with type 2 diabetes mellitus ([address] N70.5524    Collagenous colitis K52.831     Past Medical History:   Diagnosis Date    Collagenous colitis 07/09/2020    Dr Tate STREETERID-19 virus infection 2020    Degenerative arthritis of lumbar spine 04/2019    on mri multilevel foraminal and central stenosis    Diabetes mellitus ([address]     [facility]; on insulin pump; retinopathy 2021    Dog bite 12/2021    left hand    FHx: breast cancer     Hemorrhoids     Hyperlipidemia     Hypertension     Macular degeneration     and retinopathy Dr Alisia Rodriguez of both knees     Dr Pamela Pena, knee     Dr Alejandro Karimi; s/p shot    Osteopenia     [facility] DEXA t score -1.2 spine, -2.1 hip (11/10) improved from 2007/2003;  1.7 spine, -2.1 hip (2/16); 2.8 spine, -2.2 hip (5/18) Robert Vega      Past Surgical History:   Procedure Laterality Date    COLONOSCOPY N/A 2/4/2020    Dr. Jmaes Zhang 2005 neg; Dr Bahena Standard 9/15 neg; Dr Tate Cerda 2/20 collagenous colitis    HX CATARACT REMOVAL Bilateral 2001    Dr Leann Tompkins 1.7 spine, -2.1 hip  [facility]relitz    US ABD COMP      negative     Current Outpatient Medications   Medication Sig Dispense Refill    zolpidem (AMBIEN) 5 mg tablet TAKE 1 TABLET BY MOUTH NIGHTLY AS NEEDED FOR SLEEP MAXIMUM DAILY AMOUNT IS ONE TABLET 30 Tablet 1    levothyroxine (SYNTHROID) 50 mcg tablet Take 50 mcg by mouth daily. calcium-cholecalciferol, d3, 600-125 mg-unit tab Take  by mouth.      melatonin 1 mg tablet Take  by mouth nightly. multivitamin-FA-lycopen-lutein (CENTRUM SILVER) 0.4 mg-300 mcg- 250 mcg tab tablet Take  by mouth. insulin lispro (HumaLOG U-100 Insulin) 100 unit/mL injection by Continuous Infusion route. On insulin pump managed by DI       evening primrose oil 500 mg cap Take 500 mg by mouth nightly. raloxifene (EVISTA) 60 mg tablet Take  by mouth daily. simvastatin (ZOCOR) 20 mg tablet Take  by mouth nightly. lisinopril (PRINIVIL, ZESTRIL) 10 mg tablet Take 20 mg by mouth nightly. Allergies   Allergen Reactions    Fosamax [Alendronate] Other (comments)     GI upset       Family History   Problem Relation Age of Onset    Cancer Mother         breast    Breast Cancer Mother      Social History     Tobacco Use    Smoking status: Some Days     Packs/day: 0.50     Types: Cigarettes     Last attempt to quit: 1995     Years since quittin.0    Smokeless tobacco: Never   Substance Use Topics    Alcohol use:  Yes     Alcohol/week: 0.8 standard drinks     Types: 1 Glasses of wine per week     Comment: nightly         Gt Cox MD

## 2022-08-18 NOTE — PROGRESS NOTES
65 y.o. white female who presents for evaluation. She continues to see the NP at the [facility]. No polyuria, polydipsia, nocturia. No neuropathy symptoms. No new developments on the insulin pump although she was told she nhow has mild retinopathy on the left    Denies any cardiovascular complaints. She stopped walking as 'she got lazy'. Trying to get that started back    Denies GI or  complaints. She was dx'ed w collagenous colitis by Dr Ayala Norris but is now off the entecort    She saw Dr Merlinda Mouton last year and had good improvement of sx. Uses tylenol for her ojionts infrequently, sometimes low dose nsaid as well.  She has not seen Dr Dex Newsome recently    [address]: 7/10/14, 7/10/15, 3/16/17, 3/22/18, 4/15/19, 7/9/20, 8/19/21, 8/25/22    Past Medical History:   Diagnosis Date    Collagenous colitis 07/09/2020    Dr Jorge Linares     COVID-19 virus infection 2020    Degenerative arthritis of lumbar spine 04/2019    on mri multilevel foraminal and central stenosis    Diabetes mellitus ([address]     [facility]; on insulin pump; retinopathy 2021    Dog bite 12/2021    left hand    FHx: breast cancer     Hemorrhoids     Hyperlipidemia     Hypertension     Macular degeneration     and retinopathy Dr Rc Rowland of both knees     Dr All Flaherty, knee     Dr Dex Newsome; s/p shot    Osteopenia     [facility] DEXA t score -1.2 spine, -2.1 hip (11/10) improved from 2007/2003;  1.7 spine, -2.1 hip (2/16); 2.8 spine, -2.2 hip (5/18) Kelly Orellana     Past Surgical History:   Procedure Laterality Date    COLONOSCOPY N/A 2/4/2020    Dr. Lelo Braxton 2005 neg; Dr Miguelina Aguayo 9/15 neg; Dr Jorge Linares 2/20 collagenous colitis    HX CATARACT REMOVAL Bilateral 02/2001    Dr Jacek Padilla 1.7 spine, -2.1 hip 2/16 [facility]    US ABD COMP  8/02    negative     Social History     Socioeconomic History    Marital status:      Spouse name: Not on file    Number of children: 2    Years of education: Not on file    Highest education level: Not on file   Occupational History    Occupation: ret  now part time [employer]   Tobacco Use    Smoking status: Some Days     Packs/day: 0.50     Types: Cigarettes     Last attempt to quit: 1995     Years since quittin.0    Smokeless tobacco: Never   Substance and Sexual Activity    Alcohol use: Yes     Alcohol/week: 0.8 standard drinks     Types: 1 Glasses of wine per week     Comment: nightly    Drug use: No    Sexual activity: Not on file   Other Topics Concern    Not on file   Social History Narrative    Not on file     Social Determinants of Health     Financial Resource Strain: Not on file   Food Insecurity: Not on file   Transportation Needs: Not on file   Physical Activity: Not on file   Stress: Not on file   Social Connections: Not on file   Intimate Partner Violence: Not on file   Housing Stability: Not on file     Current Outpatient Medications   Medication Sig    zolpidem (AMBIEN) 5 mg tablet TAKE 1 TABLET BY MOUTH NIGHTLY AS NEEDED FOR SLEEP MAXIMUM DAILY AMOUNT IS ONE TABLET    levothyroxine (SYNTHROID) 50 mcg tablet Take 50 mcg by mouth daily. calcium-cholecalciferol, d3, 600-125 mg-unit tab Take  by mouth.    melatonin 1 mg tablet Take  by mouth nightly. multivitamin-FA-lycopen-lutein (CENTRUM SILVER) 0.4 mg-300 mcg- 250 mcg tab tablet Take  by mouth. insulin lispro (HumaLOG U-100 Insulin) 100 unit/mL injection by Continuous Infusion route. On insulin pump managed by DI     evening primrose oil 500 mg cap Take 500 mg by mouth nightly. raloxifene (EVISTA) 60 mg tablet Take  by mouth daily. simvastatin (ZOCOR) 20 mg tablet Take  by mouth nightly. lisinopril (PRINIVIL, ZESTRIL) 10 mg tablet Take 20 mg by mouth nightly. No current facility-administered medications for this visit.      Allergies   Allergen Reactions    Fosamax [Alendronate] Other (comments)     GI upset     REVIEW OF SYSTEMS:  mammo , sees Dr Treasure Ontiveros, last DEXA 2018 [facility] colo 2/20 Dr Slava Varma  Ophtho - no vision change or eye pain  Oral - no mouth pain, tongue or tooth problems  Ears - no hearing loss, ear pain, fullness, no swallowing problems  Cardiac - no CP, PND, orthopnea, edema, palpitations or syncope  Chest - no breast masses  Resp - no wheezing, chronic coughing, dyspnea  GI - no heartburn, nausea, vomiting, change in bowel habits, bleeding, hemorrhoids  Urinary - no dysuria, hematuria, flank pain, urgency, frequency  Ortho - no swelling, dec ROM, myalgias    Visit Vitals  /74 (BP 1 Location: Right upper arm, BP Patient Position: Sitting)   Pulse 79   Temp 97.6 °F (36.4 °C) (Oral)   Wt 147 lb (66.7 kg)   SpO2 97%   BMI 27.78 kg/m²      A&O x3  Affect is appropriate. Mood stable  No apparent distress  Anicteric, no JVD, adenopathy or thyromegaly. No carotid bruits or radiated murmur  Lungs clear to auscultation, no wheezes or rales  Heart showed regular rate and rhythm. No murmur, rubs, gallops  Abdomen soft nontender, no hepatosplenomegaly or masses. Extremities without edema.   Pulses 1-2+ symmetrically    LABS   From 5/12 showed   gluc 155, cr 0.69, gfr 90,  alt 12,          chol 149, tg 75, hdl 76, ldl-c 58,       hb 13.2, plt 290, sadia neg  From 11/13 showed gluc 134, cr 0.69, gfr>60,    hba1c 7.3,       wbc 7.9, hb 11.1, plt 235,             ck/trop-  From 1/14 showed                       umar 9  From 4/14 showed        hba1c 7.0, chol 162, tg 73, hdl 98, ldl-c 50  From 7/15 showed   gluc 101, cr 0.76, gfr>60, alt<5,  hba1c 8.0, chol 152, tg 49, hdl 81, ldl-c 61,  wbc 5.3, hb 13.6, plt 245, umar 4.2  From 3/17 showed   gluc 90,   cr 0.80, gfr>60, alt 20, hba1c 8.2, chol 156, tg 58, hdl 97, ldl-c 47,  wbc 6.1, hb 13.5, plt 279, umar 7.0  From 3/18 showed   gluc 84,   cr 0.68, gfr>60, alt 20, hba1c 7.9, chol 135, tg 52, hdl 90, ldl-c 35,  wbc 4.5, hb 14.7, plt 292, umar 11  From 4/19 showed   gluc 90,   cr 0.80, gfr>60, alt 20, hba1c 8.0, chol 154, tg 61, hdl 86, ldl-c 56,  wbc 6.0, hb 14.1, plt 287, umar 16  From 6/20 showed   gluc 169, cr 0.85, gfr>60, alt 21, hba1c 7.4, chol 152, tg 47, hdl 91, ldl-c 52,  wbc 6.8, hb 13.9, plt 280, umar 9,    tsh 3.81  From 8/21 showed   gluc 139, cr 0.74, gfr>60, alt 21, hba1c 7.1, chol 145, tg 40, hdl 85, ldl-c 52,  wbc 5.7, hb 13.2, plt 271  umar 9,    Results for orders placed or performed during the hospital encounter of 08/18/22   CBC W/O DIFF   Result Value Ref Range    WBC 7.4 4.6 - 13.2 K/uL    RBC 4.27 4.20 - 5.30 M/uL    HGB 13.9 12.0 - 16.0 g/dL    HCT 42.4 35.0 - 45.0 %    MCV 99.3 78.0 - 100.0 FL    MCH 32.6 24.0 - 34.0 PG    MCHC 32.8 31.0 - 37.0 g/dL    RDW 14.5 11.6 - 14.5 %    PLATELET 742 743 - 248 K/uL    MPV 10.0 9.2 - 11.8 FL    NRBC 0.0 0  WBC    ABSOLUTE NRBC 0.00 0.00 - 9.98 K/uL   METABOLIC PANEL, COMPREHENSIVE   Result Value Ref Range    Sodium 141 136 - 145 mmol/L    Potassium 4.2 3.5 - 5.5 mmol/L    Chloride 108 100 - 111 mmol/L    CO2 28 21 - 32 mmol/L    Anion gap 5 3.0 - 18 mmol/L    Glucose 133 (H) 74 - 99 mg/dL    BUN 14 7.0 - 18 MG/DL    Creatinine 0.78 0.6 - 1.3 MG/DL    BUN/Creatinine ratio 18 12 - 20      GFR est AA >60 >60 ml/min/1.73m2    GFR est non-AA >60 >60 ml/min/1.73m2    Calcium 9.1 8.5 - 10.1 MG/DL    Bilirubin, total 0.5 0.2 - 1.0 MG/DL    ALT (SGPT) 19 13 - 56 U/L    AST (SGOT) 17 10 - 38 U/L    Alk.  phosphatase 82 45 - 117 U/L    Protein, total 7.0 6.4 - 8.2 g/dL    Albumin 3.5 3.4 - 5.0 g/dL    Globulin 3.5 2.0 - 4.0 g/dL    A-G Ratio 1.0 0.8 - 1.7     LIPID PANEL   Result Value Ref Range    LIPID PROFILE          Cholesterol, total 147 <200 MG/DL    Triglyceride 88 <150 MG/DL    HDL Cholesterol 77 (H) 40 - 60 MG/DL    LDL, calculated 52.4 0 - 100 MG/DL    VLDL, calculated 17.6 MG/DL    CHOL/HDL Ratio 1.9 0 - 5.0     MICROALBUMIN, UR, RAND W/ MICROALB/CREAT RATIO   Result Value Ref Range    Microalbumin,urine random 1.11 0 - 3.0 MG/DL Creatinine, urine 101.00 30 - 125 mg/dL    Microalbumin/Creat ratio (mg/g creat) 11 0 - 30 mg/g   HEMOGLOBIN A1C WITH EAG   Result Value Ref Range    Hemoglobin A1c 7.3 (H) 4.2 - 5.6 %    Est. average glucose 163 mg/dL     We reviewed the patient's labs from the last several visits to point out trends in the numbers            Patient Active Problem List   Diagnosis Code    Diabetes mellitus on insulin pump Dr. Gill Hopper E11.9    Osteopenia M85.80    Hyperlipidemia E78.5    Primary hypertension I10    Proliferative diabetic retinopathy associated with type 2 diabetes mellitus ([facility] R10.1072    Collagenous colitis K52.831     Assessment and plan:  1. Diabetes. Doing well on insulin pump, f/u NP at [facility], f/u Dr. Pascual Staff  2. Hypertension. Controlled on yusuf  3. Hyperlipidemia. At target on zolor  4. Osteopenia. Continue Evista, calcium plus D., weight bearing exercise. DEXA being followed at [facility]        RTC 8/22    Above conditions discussed at length and patient vocalized understanding. All questions answered to patient satisfaction          ICD-10-CM ICD-9-CM    1. Diabetes mellitus due to underlying condition with diabetic nephropathy, without long-term current use of insulin ([facility])  E08.21 249.40 CBC W/O DIFF     212.18 METABOLIC PANEL, COMPREHENSIVE      MICROALBUMIN, UR, RAND W/ MICROALB/CREAT RATIO      LIPID PANEL      HEMOGLOBIN A1C WITH EAG      2. Primary hypertension  I10 401.9       3. Hyperlipidemia, unspecified hyperlipidemia type  E78.5 272.4       4. Collagenous colitis  K52.831 558.9       5.  Stable proliferative diabetic retinopathy associated with type 2 diabetes mellitus, unspecified laterality ([facility]  E11.3559 250.50      362.02

## 2022-08-25 ENCOUNTER — OFFICE VISIT (OUTPATIENT)
Dept: INTERNAL MEDICINE CLINIC | Age: 77
End: 2022-08-25
Payer: MEDICARE

## 2022-08-25 VITALS
BODY MASS INDEX: 27.78 KG/M2 | HEART RATE: 79 BPM | TEMPERATURE: 97.6 F | DIASTOLIC BLOOD PRESSURE: 74 MMHG | WEIGHT: 147 LBS | SYSTOLIC BLOOD PRESSURE: 138 MMHG | OXYGEN SATURATION: 97 %

## 2022-08-25 DIAGNOSIS — K52.831 COLLAGENOUS COLITIS: ICD-10-CM

## 2022-08-25 DIAGNOSIS — I10 PRIMARY HYPERTENSION: ICD-10-CM

## 2022-08-25 DIAGNOSIS — Z00.00 MEDICARE ANNUAL WELLNESS VISIT, SUBSEQUENT: Primary | ICD-10-CM

## 2022-08-25 DIAGNOSIS — E11.3559 STABLE PROLIFERATIVE DIABETIC RETINOPATHY ASSOCIATED WITH TYPE 2 DIABETES MELLITUS, UNSPECIFIED LATERALITY (HCC): ICD-10-CM

## 2022-08-25 DIAGNOSIS — E08.21 DIABETES MELLITUS DUE TO UNDERLYING CONDITION WITH DIABETIC NEPHROPATHY, WITHOUT LONG-TERM CURRENT USE OF INSULIN (HCC): ICD-10-CM

## 2022-08-25 DIAGNOSIS — Z71.89 ADVANCED DIRECTIVES, COUNSELING/DISCUSSION: ICD-10-CM

## 2022-08-25 DIAGNOSIS — E78.5 HYPERLIPIDEMIA, UNSPECIFIED HYPERLIPIDEMIA TYPE: ICD-10-CM

## 2022-08-25 PROCEDURE — 99497 ADVNCD CARE PLAN 30 MIN: CPT | Performed by: INTERNAL MEDICINE

## 2022-08-25 PROCEDURE — 3051F HG A1C>EQUAL 7.0%<8.0%: CPT | Performed by: INTERNAL MEDICINE

## 2022-08-25 PROCEDURE — 1123F ACP DISCUSS/DSCN MKR DOCD: CPT | Performed by: INTERNAL MEDICINE

## 2022-08-25 PROCEDURE — G8427 DOCREV CUR MEDS BY ELIG CLIN: HCPCS | Performed by: INTERNAL MEDICINE

## 2022-08-25 PROCEDURE — 1090F PRES/ABSN URINE INCON ASSESS: CPT | Performed by: INTERNAL MEDICINE

## 2022-08-25 PROCEDURE — G8399 PT W/DXA RESULTS DOCUMENT: HCPCS | Performed by: INTERNAL MEDICINE

## 2022-08-25 PROCEDURE — G0439 PPPS, SUBSEQ VISIT: HCPCS | Performed by: INTERNAL MEDICINE

## 2022-08-25 PROCEDURE — G8752 SYS BP LESS 140: HCPCS | Performed by: INTERNAL MEDICINE

## 2022-08-25 PROCEDURE — G8417 CALC BMI ABV UP PARAM F/U: HCPCS | Performed by: INTERNAL MEDICINE

## 2022-08-25 PROCEDURE — 99214 OFFICE O/P EST MOD 30 MIN: CPT | Performed by: INTERNAL MEDICINE

## 2022-08-25 PROCEDURE — G8510 SCR DEP NEG, NO PLAN REQD: HCPCS | Performed by: INTERNAL MEDICINE

## 2022-08-25 PROCEDURE — G0463 HOSPITAL OUTPT CLINIC VISIT: HCPCS | Performed by: INTERNAL MEDICINE

## 2022-08-25 PROCEDURE — G8754 DIAS BP LESS 90: HCPCS | Performed by: INTERNAL MEDICINE

## 2022-08-25 PROCEDURE — G8536 NO DOC ELDER MAL SCRN: HCPCS | Performed by: INTERNAL MEDICINE

## 2022-08-25 PROCEDURE — 1101F PT FALLS ASSESS-DOCD LE1/YR: CPT | Performed by: INTERNAL MEDICINE

## 2022-08-25 NOTE — PROGRESS NOTES
1. \"Have you been to the ER, urgent care clinic since your last visit? Hospitalized since your last visit? \" Yes, [facility],  Dec 2021 for a dog bite    2. \"Have you seen or consulted any other health care providers outside of the [address] [place] [address] since your last visit? \" No     3. For patients aged 39-70: Has the patient had a colonoscopy / FIT/ Cologuard? NA - based on age      If the patient is female:    4. For patients aged 41-77: Has the patient had a mammogram within the past 2 years? NA - based on age or sex      11. For patients aged 21-65: Has the patient had a pap smear?  NA - based on age or sex

## 2022-08-25 NOTE — ACP (ADVANCE CARE PLANNING)
Advance Care Planning     Advance Care Planning (ACP) Physician/NP/PA Conversation      Date of Conversation: 8/25/2022  Conducted with: Patient with Decision Making Capacity    Healthcare Decision Maker:     Primary Decision Maker: Luh Marroquin - Cheryl - [phone]  Click here to complete [address] including selection of the Healthcare Decision Maker Relationship (ie \"Primary\")      Today we documented Decision Maker(s) consistent with Legal Next of Kin hierarchy. Care Preferences:    Hospitalization: \"If your health worsens and it becomes clear that your chance of recovery is unlikely, what would be your preference regarding hospitalization? \"  The patient would prefer hospitalization. Ventilation: \"If you were unable to breathe on your own and your chance of recovery was unlikely, what would be your preference about the use of a ventilator (breathing machine) if it was available to you? \"   The patient would desire the use of a ventilator. Resuscitation: \"In the event your heart stopped as a result of an underlying serious health condition, would you want attempts to be made to restart your heart, or would you prefer a natural death? \"   Yes, attempt to resuscitate.     Additional topics discussed: ventilation preferences, hospitalization preferences, and resuscitation preferences    Conversation Outcomes / Follow-Up Plan:   ACP in process - information provided, considering goals and options  Reviewed DNR/DNI and patient elects Full Code (Attempt Resuscitation)     Length of Voluntary ACP Conversation in minutes:  16 minutes    Wanda Santos MD

## 2022-08-25 NOTE — PATIENT INSTRUCTIONS
Medicare Wellness Visit, Female     The best way to live healthy is to have a lifestyle where you eat a well-balanced diet, exercise regularly, limit alcohol use, and quit all forms of tobacco/nicotine, if applicable. Regular preventive services are another way to keep healthy. Preventive services (vaccines, screening tests, monitoring & exams) can help personalize your care plan, which helps you manage your own care. Screening tests can find health problems at the earliest stages, when they are easiest to treat. [facility] follows the current, evidence-based guidelines published by the [facility] ([facility]) when recommending preventive services for our patients. Because we follow these guidelines, sometimes recommendations change over time as research supports it. (For example, mammograms used to be recommended annually. Even though Medicare will still pay for an annual mammogram, the newer guidelines recommend a mammogram every two years for women of average risk). Of course, you and your doctor may decide to screen more often for some diseases, based on your risk and your co-morbidities (chronic disease you are already diagnosed with). Preventive services for you include:  - Medicare offers their members a free annual wellness visit, which is time for you and your primary care provider to discuss and plan for your preventive service needs. Take advantage of this benefit every year!  -All adults over the age of 72 should receive the recommended pneumonia vaccines. Current USPSTF guidelines recommend a series of two vaccines for the best pneumonia protection.   -All adults should have a flu vaccine yearly and a tetanus vaccine every 10 years.   -All adults age 48 and older should receive the shingles vaccines (series of two vaccines).       -All adults age 38-68 who are overweight should have a diabetes screening test once every three years.   -All adults born between 80 and 1965 should be screened once for Hepatitis C.  -Other screening tests and preventive services for persons with diabetes include: an eye exam to screen for diabetic retinopathy, a kidney function test, a foot exam, and stricter control over your cholesterol.   -Cardiovascular screening for adults with routine risk involves an electrocardiogram (ECG) at intervals determined by your doctor.   -Colorectal cancer screenings should be done for adults age 54-65 with no increased risk factors for colorectal cancer. There are a number of acceptable methods of screening for this type of cancer. Each test has its own benefits and drawbacks. Discuss with your doctor what is most appropriate for you during your annual wellness visit. The different tests include: colonoscopy (considered the best screening method), a fecal occult blood test, a fecal DNA test, and sigmoidoscopy.    -A bone mass density test is recommended when a woman turns 65 to screen for osteoporosis. This test is only recommended one time, as a screening. Some providers will use this same test as a disease monitoring tool if you already have osteoporosis. -Breast cancer screenings are recommended every other year for women of normal risk, age 54-69.  -Cervical cancer screenings for women over age 72 are only recommended with certain risk factors. Here is a list of your current Health Maintenance items (your personalized list of preventive services) with a due date: There are no preventive care reminders to display for this patient. Medicare Wellness Visit, Female     The best way to live healthy is to have a lifestyle where you eat a well-balanced diet, exercise regularly, limit alcohol use, and quit all forms of tobacco/nicotine, if applicable. Regular preventive services are another way to keep healthy.  Preventive services (vaccines, screening tests, monitoring & exams) can help personalize your care plan, which helps you manage your own care. Screening tests can find health problems at the earliest stages, when they are easiest to treat. [facility] follows the current, evidence-based guidelines published by the [facility] ([facility]) when recommending preventive services for our patients. Because we follow these guidelines, sometimes recommendations change over time as research supports it. (For example, mammograms used to be recommended annually. Even though Medicare will still pay for an annual mammogram, the newer guidelines recommend a mammogram every two years for women of average risk). Of course, you and your doctor may decide to screen more often for some diseases, based on your risk and your co-morbidities (chronic disease you are already diagnosed with). Preventive services for you include:  - Medicare offers their members a free annual wellness visit, which is time for you and your primary care provider to discuss and plan for your preventive service needs. Take advantage of this benefit every year!  -All adults over the age of 72 should receive the recommended pneumonia vaccines. Current USPSTF guidelines recommend a series of two vaccines for the best pneumonia protection.   -All adults should have a flu vaccine yearly and a tetanus vaccine every 10 years.   -All adults age 48 and older should receive the shingles vaccines (series of two vaccines).       -All adults age 38-68 who are overweight should have a diabetes screening test once every three years.   -All adults born between 80 and 1965 should be screened once for Hepatitis C.  -Other screening tests and preventive services for persons with diabetes include: an eye exam to screen for diabetic retinopathy, a kidney function test, a foot exam, and stricter control over your cholesterol.   -Cardiovascular screening for adults with routine risk involves an electrocardiogram (ECG) at intervals determined by your doctor.   -Colorectal cancer screenings should be done for adults age 54-65 with no increased risk factors for colorectal cancer. There are a number of acceptable methods of screening for this type of cancer. Each test has its own benefits and drawbacks. Discuss with your doctor what is most appropriate for you during your annual wellness visit. The different tests include: colonoscopy (considered the best screening method), a fecal occult blood test, a fecal DNA test, and sigmoidoscopy.    -A bone mass density test is recommended when a woman turns 65 to screen for osteoporosis. This test is only recommended one time, as a screening. Some providers will use this same test as a disease monitoring tool if you already have osteoporosis. -Breast cancer screenings are recommended every other year for women of normal risk, age 54-69.  -Cervical cancer screenings for women over age 72 are only recommended with certain risk factors. Here is a list of your current Health Maintenance items (your personalized list of preventive services) with a due date: There are no preventive care reminders to display for this patient.

## 2022-12-20 ENCOUNTER — PATIENT OUTREACH (OUTPATIENT)
Dept: CASE MANAGEMENT | Age: 77
End: 2022-12-20

## 2023-02-03 DIAGNOSIS — E08.21 DIABETES MELLITUS DUE TO UNDERLYING CONDITION WITH DIABETIC NEPHROPATHY, WITHOUT LONG-TERM CURRENT USE OF INSULIN (HCC): Primary | ICD-10-CM

## 2023-02-05 DIAGNOSIS — E08.21 DIABETES MELLITUS DUE TO UNDERLYING CONDITION WITH DIABETIC NEPHROPATHY, WITHOUT LONG-TERM CURRENT USE OF INSULIN (HCC): Primary | ICD-10-CM

## 2023-03-16 ENCOUNTER — TRANSCRIBE ORDERS (OUTPATIENT)
Facility: HOSPITAL | Age: 78
End: 2023-03-16

## 2023-03-16 DIAGNOSIS — Z12.31 VISIT FOR SCREENING MAMMOGRAM: Primary | ICD-10-CM

## 2023-05-30 DIAGNOSIS — G47.00 INSOMNIA, UNSPECIFIED TYPE: Primary | ICD-10-CM

## 2023-05-31 NOTE — TELEPHONE ENCOUNTER
[facility] reviewed 5/31/2023    The last fill date was 12/14/2022 for a 30 d/s qty 30      Last UDS: not on file     CSA Last signed: not on file         PCP: Nel Howell MD        Requested Prescriptions     Pending Prescriptions Disp Refills    zolpidem (AMBIEN) 5 MG tablet [Pharmacy Med Name: ZOLPIDEM TARTRATE 5 MG TABLET] 30 tablet 0     Sig: take 1 tablet by mouth at bedtime if needed for sleep

## 2023-06-01 RX ORDER — ZOLPIDEM TARTRATE 5 MG/1
TABLET ORAL
Qty: 30 TABLET | Refills: 0 | Status: SHIPPED | OUTPATIENT
Start: 2023-06-01 | End: 2023-07-01

## 2023-06-19 ENCOUNTER — APPOINTMENT (OUTPATIENT)
Facility: HOSPITAL | Age: 78
End: 2023-06-19
Payer: MEDICARE

## 2023-06-19 ENCOUNTER — HOSPITAL ENCOUNTER (EMERGENCY)
Facility: HOSPITAL | Age: 78
Discharge: HOME | End: 2023-06-19
Attending: EMERGENCY MEDICINE
Payer: MEDICARE

## 2023-06-19 VITALS
DIASTOLIC BLOOD PRESSURE: 67 MMHG | RESPIRATION RATE: 16 BRPM | HEART RATE: 88 BPM | HEIGHT: 62 IN | SYSTOLIC BLOOD PRESSURE: 155 MMHG | BODY MASS INDEX: 27.6 KG/M2 | OXYGEN SATURATION: 98 % | TEMPERATURE: 97.9 F | WEIGHT: 150 LBS

## 2023-06-19 DIAGNOSIS — M25.552 LEFT HIP PAIN: Primary | ICD-10-CM

## 2023-06-19 PROCEDURE — 6360000002 HC RX W HCPCS: Performed by: HEALTH CARE PROVIDER

## 2023-06-19 PROCEDURE — 99284 EMERGENCY DEPT VISIT MOD MDM: CPT

## 2023-06-19 PROCEDURE — 73502 X-RAY EXAM HIP UNI 2-3 VIEWS: CPT

## 2023-06-19 PROCEDURE — 6370000000 HC RX 637 (ALT 250 FOR IP): Performed by: HEALTH CARE PROVIDER

## 2023-06-19 PROCEDURE — 96372 THER/PROPH/DIAG INJ SC/IM: CPT

## 2023-06-19 PROCEDURE — 73700 CT LOWER EXTREMITY W/O DYE: CPT

## 2023-06-19 RX ORDER — KETOROLAC TROMETHAMINE 15 MG/ML
15 INJECTION, SOLUTION INTRAMUSCULAR; INTRAVENOUS ONCE
Status: COMPLETED | OUTPATIENT
Start: 2023-06-19 | End: 2023-06-19

## 2023-06-19 RX ORDER — LIDOCAINE 4 G/G
1 PATCH TOPICAL
Status: DISCONTINUED | OUTPATIENT
Start: 2023-06-19 | End: 2023-06-19 | Stop reason: HOSPADM

## 2023-06-19 RX ADMIN — KETOROLAC TROMETHAMINE 15 MG: 15 INJECTION, SOLUTION INTRAMUSCULAR; INTRAVENOUS at 18:55

## 2023-06-19 ASSESSMENT — LIFESTYLE VARIABLES
HOW MANY STANDARD DRINKS CONTAINING ALCOHOL DO YOU HAVE ON A TYPICAL DAY: 1 OR 2
HOW OFTEN DO YOU HAVE A DRINK CONTAINING ALCOHOL: 4 OR MORE TIMES A WEEK

## 2023-06-19 ASSESSMENT — PAIN SCALES - GENERAL: PAINLEVEL_OUTOF10: 8

## 2023-06-19 ASSESSMENT — ENCOUNTER SYMPTOMS
SHORTNESS OF BREATH: 0
ABDOMINAL PAIN: 0
VOMITING: 0
NAUSEA: 0
DIARRHEA: 0
CHEST TIGHTNESS: 0
COUGH: 0

## 2023-06-19 ASSESSMENT — PAIN DESCRIPTION - LOCATION: LOCATION: HIP

## 2023-06-19 ASSESSMENT — PAIN - FUNCTIONAL ASSESSMENT: PAIN_FUNCTIONAL_ASSESSMENT: 0-10

## 2023-06-19 ASSESSMENT — PAIN DESCRIPTION - ORIENTATION: ORIENTATION: LEFT

## 2023-06-19 NOTE — ED TRIAGE NOTES
Pt to ED for eval of sudden onset atraumatic left hip pain since this AM. Pt states she was getting ready to get in the shower when she heard and felt it \"pop\" and move. Pt ambulates with limp.  Pain with ambulation relieved with rest.

## 2023-06-19 NOTE — ED PROVIDER NOTES
EMERGENCY DEPARTMENT HISTORY AND PHYSICAL EXAM        Date: 6/19/2023  Patient Name: Roel Coello    History of Presenting Illness     Chief Complaint   Patient presents with    Hip Pain       History Provided By: History obtained from patient  Accompanied in the ED by none    HPI: Roel Coello, 65 y.o. female PMHx significant for osteopenia, T1DM,  presents by personal vehicle to the ED with cc of left hip pain x 2 hours  Onset: Taking shower, she bent over and felt a pop in left hip. Location: Left superior, medial glute  Aggravating: Weightbearing  Associated symptoms: no numbness or tingling, no weakness  Relieving: no pain at rest  Severity: walking pain is 8/10    Blood thinners: no  Last oral intake: 11 am    No nausea, vomiting, diarrhea, fever, chills, chest pain, shortness of breath, leg swelling    There are no other complaints, changes, or physical findings at this time. PCP: Bambi Em MD    No current facility-administered medications on file prior to encounter. Current Outpatient Medications on File Prior to Encounter   Medication Sig Dispense Refill    zolpidem (AMBIEN) 5 MG tablet take 1 tablet by mouth at bedtime if needed for sleep 30 tablet 0    Calcium Carbonate-Vitamin D 600-3. 125 MG-MCG TABS Take by mouth      Evening Primrose Oil 500 MG CAPS Take 500 mg by mouth      insulin lispro (HUMALOG) 100 UNIT/ML SOLN injection vial by Continuous Infusion route.  On insulin pump managed by DI      levothyroxine (SYNTHROID) 50 MCG tablet Take 50 mcg by mouth daily      lisinopril (PRINIVIL;ZESTRIL) 10 MG tablet Take 20 mg by mouth      melatonin 1 MG tablet Take by mouth      raloxifene (EVISTA) 60 MG tablet Take by mouth daily      simvastatin (ZOCOR) 20 MG tablet Take by mouth           Past History     Past Medical History:  Past Medical History:   Diagnosis Date    Collagenous colitis 07/09/2020    Dr Fay Ramirez     COVID-19 virus infection 2020    Degenerative arthritis of lumbar

## 2023-06-19 NOTE — DISCHARGE INSTRUCTIONS
Take tylenol and use lidocaine patches as needed for pain. Follow-up with your primary care provider as needed for ongoing hip pain and mobility assistance as needed. CT Hip Left Findings:     Bones: No fractures. Mild hypertrophic changes left hip. Mild hypertrophic  changes as well at the symphysis pubis. Soft tissues: Fat stranding in the subcutaneous tissues lateral to the left hip  at the level of the acetabulum. No fluid-filled bursa. No joint effusion. Soft tissue structures in the pelvis are unremarkable. IMPRESSION:  : No acute abnormalities at the left hip. No fracture. Mild DJD. Fat stranding lateral to the left hip that might be contusion.

## 2023-06-21 NOTE — PROGRESS NOTES
Patient: Jeyson Oscar                MRN: [id number]       SSN: [id number]  YOB: 1945        AGE: 65 y.o. SEX: female      PCP: Nestor Russell MD  23    No chief complaint on file. HISTORY:  Jeyson Oscar is a 65 y.o. female who is seen for left hip pain. He bent over and felt something move in her left hip on 2023. She experienced a lateral hip pain after that episode for which she was seen at [facility] emergency room. For a and CT scan left hip were negative for fracture or anything else acute. Since then her pains have dissipated. She states that she is scared that the episode will happen again and she has been read to increase her activity level. She was seen at [facility] ED on 2023     Occupation, etc: Ms. Arta Kanner is currently retired. 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 She is very active in her longterm. She lives in [place] with her daughter. She has also has 1 son and 2 grandchildren. She likes to travel with her girlfriend to [place] in the winter and [facility] in the summer. She is a type I diabetic. She uses an insulin pump. Her last hemoglobin A1c was 7.2. Her weight has been stable recently. Wt Readings from Last 3 Encounters:   23 150 lb (68 kg)   22 147 lb (66.7 kg)   22 150 lb 9.6 oz (68.3 kg)      There is no height or weight on file to calculate BMI.     Patient Active Problem List   Diagnosis    Primary hypertension    Osteopenia    Collagenous colitis    Diabetes mellitus ([facility]    Hyperlipidemia    Proliferative diabetic retinopathy associated with type 2 diabetes mellitus ([facility]       Social History     Tobacco Use    Smoking status: Some Days     Packs/day: 0.50     Types: Cigarettes     Last attempt to quit: 1995     Years since quittin.8    Smokeless tobacco: Never   Substance Use Topics

## 2023-06-23 ENCOUNTER — OFFICE VISIT (OUTPATIENT)
Age: 78
End: 2023-06-23

## 2023-06-23 VITALS — HEIGHT: 62 IN | WEIGHT: 151 LBS | BODY MASS INDEX: 27.79 KG/M2

## 2023-06-23 DIAGNOSIS — M16.12 PRIMARY OSTEOARTHRITIS OF LEFT HIP: ICD-10-CM

## 2023-06-23 DIAGNOSIS — M25.552 PAIN OF LEFT HIP: Primary | ICD-10-CM

## 2023-06-23 DIAGNOSIS — E10.9 TYPE 1 DIABETES MELLITUS WITHOUT COMPLICATION (HCC): ICD-10-CM

## 2023-08-11 DIAGNOSIS — G47.00 INSOMNIA, UNSPECIFIED TYPE: ICD-10-CM

## 2023-08-14 RX ORDER — ZOLPIDEM TARTRATE 5 MG/1
TABLET ORAL
Qty: 30 TABLET | Refills: 0 | Status: SHIPPED | OUTPATIENT
Start: 2023-08-14 | End: 2023-09-13

## 2023-08-21 ENCOUNTER — HOSPITAL ENCOUNTER (OUTPATIENT)
Facility: HOSPITAL | Age: 78
Setting detail: SPECIMEN
Discharge: HOME | End: 2023-08-24
Payer: MEDICARE

## 2023-08-21 DIAGNOSIS — E08.21 DIABETES MELLITUS DUE TO UNDERLYING CONDITION WITH DIABETIC NEPHROPATHY, WITHOUT LONG-TERM CURRENT USE OF INSULIN (HCC): ICD-10-CM

## 2023-08-21 LAB
ALBUMIN SERPL-MCNC: 3.2 G/DL (ref 3.4–5)
ALBUMIN/GLOB SERPL: 1 (ref 0.8–1.7)
ALP SERPL-CCNC: 80 U/L (ref 45–117)
ALT SERPL-CCNC: 18 U/L (ref 13–56)
ANION GAP SERPL CALC-SCNC: 5 MMOL/L (ref 3–18)
AST SERPL-CCNC: 16 U/L (ref 10–38)
BILIRUB SERPL-MCNC: 0.3 MG/DL (ref 0.2–1)
BUN SERPL-MCNC: 15 MG/DL (ref 7–18)
BUN/CREAT SERPL: 19 (ref 12–20)
CALCIUM SERPL-MCNC: 8.8 MG/DL (ref 8.5–10.1)
CHLORIDE SERPL-SCNC: 109 MMOL/L (ref 100–111)
CHOLEST SERPL-MCNC: 154 MG/DL
CO2 SERPL-SCNC: 29 MMOL/L (ref 21–32)
CREAT SERPL-MCNC: 0.78 MG/DL (ref 0.6–1.3)
CREAT UR-MCNC: 76 MG/DL (ref 30–125)
ERYTHROCYTE [DISTWIDTH] IN BLOOD BY AUTOMATED COUNT: 14.1 % (ref 11.6–14.5)
EST. AVERAGE GLUCOSE BLD GHB EST-MCNC: 151 MG/DL
GLOBULIN SER CALC-MCNC: 3.1 G/DL (ref 2–4)
GLUCOSE SERPL-MCNC: 114 MG/DL (ref 74–99)
HBA1C MFR BLD: 6.9 % (ref 4.2–5.6)
HCT VFR BLD AUTO: 39.6 % (ref 35–45)
HDLC SERPL-MCNC: 78 MG/DL (ref 40–60)
HDLC SERPL: 2 (ref 0–5)
HGB BLD-MCNC: 13.2 G/DL (ref 12–16)
LDLC SERPL CALC-MCNC: 62 MG/DL (ref 0–100)
LIPID PANEL: ABNORMAL
MCH RBC QN AUTO: 33.2 PG (ref 24–34)
MCHC RBC AUTO-ENTMCNC: 33.3 G/DL (ref 31–37)
MCV RBC AUTO: 99.5 FL (ref 78–100)
MICROALBUMIN UR-MCNC: 1.35 MG/DL (ref 0–3)
MICROALBUMIN/CREAT UR-RTO: 18 MG/G (ref 0–30)
NRBC # BLD: 0 K/UL (ref 0–0.01)
NRBC BLD-RTO: 0 PER 100 WBC
PLATELET # BLD AUTO: 310 K/UL (ref 135–420)
PMV BLD AUTO: 10.1 FL (ref 9.2–11.8)
POTASSIUM SERPL-SCNC: 4.1 MMOL/L (ref 3.5–5.5)
PROT SERPL-MCNC: 6.3 G/DL (ref 6.4–8.2)
RBC # BLD AUTO: 3.98 M/UL (ref 4.2–5.3)
SODIUM SERPL-SCNC: 143 MMOL/L (ref 136–145)
TRIGL SERPL-MCNC: 70 MG/DL
VLDLC SERPL CALC-MCNC: 14 MG/DL
WBC # BLD AUTO: 9.1 K/UL (ref 4.6–13.2)

## 2023-08-21 PROCEDURE — 85027 COMPLETE CBC AUTOMATED: CPT

## 2023-08-21 PROCEDURE — 83036 HEMOGLOBIN GLYCOSYLATED A1C: CPT

## 2023-08-21 PROCEDURE — 82043 UR ALBUMIN QUANTITATIVE: CPT

## 2023-08-21 PROCEDURE — 80061 LIPID PANEL: CPT

## 2023-08-21 PROCEDURE — 82570 ASSAY OF URINE CREATININE: CPT

## 2023-08-21 PROCEDURE — 80053 COMPREHEN METABOLIC PANEL: CPT

## 2023-08-21 PROCEDURE — 36415 COLL VENOUS BLD VENIPUNCTURE: CPT

## 2023-09-06 ENCOUNTER — TELEPHONE (OUTPATIENT)
Age: 78
End: 2023-09-06

## 2023-09-06 NOTE — TELEPHONE ENCOUNTER
Pt calling has yearly visit scheduled for tomorrow 09/07/23    Stated tested positive for Covid and was seen at [facility]. She is getting better. She asked if you want her appt changed to a virtual or should it be re-scheduled to a later date. She only see you once per year.  Her last OV was 08/25/2022    Please advise

## 2023-09-15 ENCOUNTER — OFFICE VISIT (OUTPATIENT)
Age: 78
End: 2023-09-15

## 2023-09-15 VITALS
OXYGEN SATURATION: 98 % | WEIGHT: 148 LBS | BODY MASS INDEX: 27.23 KG/M2 | RESPIRATION RATE: 16 BRPM | TEMPERATURE: 97.2 F | HEIGHT: 62 IN | DIASTOLIC BLOOD PRESSURE: 72 MMHG | HEART RATE: 72 BPM | SYSTOLIC BLOOD PRESSURE: 120 MMHG

## 2023-09-15 DIAGNOSIS — Z71.89 ADVANCED CARE PLANNING/COUNSELING DISCUSSION: ICD-10-CM

## 2023-09-15 DIAGNOSIS — I10 PRIMARY HYPERTENSION: ICD-10-CM

## 2023-09-15 DIAGNOSIS — Z79.4 TYPE 2 DIABETES MELLITUS WITH STABLE PROLIFERATIVE RETINOPATHY, WITH LONG-TERM CURRENT USE OF INSULIN, UNSPECIFIED LATERALITY (HCC): ICD-10-CM

## 2023-09-15 DIAGNOSIS — E11.3559 TYPE 2 DIABETES MELLITUS WITH STABLE PROLIFERATIVE RETINOPATHY, WITH LONG-TERM CURRENT USE OF INSULIN, UNSPECIFIED LATERALITY (HCC): ICD-10-CM

## 2023-09-15 DIAGNOSIS — Z00.00 MEDICARE ANNUAL WELLNESS VISIT, SUBSEQUENT: Primary | ICD-10-CM

## 2023-09-15 DIAGNOSIS — E78.5 HYPERLIPIDEMIA, UNSPECIFIED HYPERLIPIDEMIA TYPE: ICD-10-CM

## 2023-09-15 RX ORDER — M-VIT,TX,IRON,MINS/CALC/FOLIC 27MG-0.4MG
1 TABLET ORAL DAILY
COMMUNITY

## 2023-09-15 RX ORDER — UREA 10 %
1 LOTION (ML) TOPICAL NIGHTLY PRN
Qty: 30 TABLET | Refills: 11 | Status: SHIPPED | OUTPATIENT
Start: 2023-09-15

## 2023-09-15 ASSESSMENT — PATIENT HEALTH QUESTIONNAIRE - PHQ9
SUM OF ALL RESPONSES TO PHQ QUESTIONS 1-9: 0
2. FEELING DOWN, DEPRESSED OR HOPELESS: 0
SUM OF ALL RESPONSES TO PHQ QUESTIONS 1-9: 0
1. LITTLE INTEREST OR PLEASURE IN DOING THINGS: 0
SUM OF ALL RESPONSES TO PHQ QUESTIONS 1-9: 0
SUM OF ALL RESPONSES TO PHQ9 QUESTIONS 1 & 2: 0
SUM OF ALL RESPONSES TO PHQ QUESTIONS 1-9: 0

## 2023-09-15 NOTE — ACP (ADVANCE CARE PLANNING)
Advance Care Planning     Advance Care Planning (ACP) Physician/NP/PA Conversation    Date of Conversation: 9/15/2023  Conducted with: Patient with Decision Making Capacity    Healthcare Decision Maker:      Primary Decision Maker: Karthikeyan Silver - [phone]    Click here to complete [address] including selection of the Healthcare Decision Maker Relationship (ie \"Primary\")  Today we documented Decision Maker(s) consistent with Legal Next of Kin hierarchy. Care Preferences:    Hospitalization: \"If your health worsens and it becomes clear that your chance of recovery is unlikely, what would be your preference regarding hospitalization? \"  The patient would prefer hospitalization. LONG DISCUSSION ABOUT WHAT DNR MEANS, she wants to think about it, we gave her the form to fill out in case she elects do to it    Ventilation: \"If you were unable to breath on your own and your chance of recovery was unlikely, what would be your preference about the use of a ventilator (breathing machine) if it was available to you? \"  The patient would desire the use of a ventilator. Resuscitation: \"In the event your heart stopped as a result of an underlying serious health condition, would you want attempts made to restart your heart, or would you prefer a natural death? \"  Yes, attempt to resuscitate.     ventilation preferences, hospitalization preferences, and resuscitation preferences    Conversation Outcomes / Follow-Up Plan:  ACP in process - information provided, considering goals and options  Reviewed DNR/DNI and patient elects Full Code (Attempt Resuscitation)    Length of Voluntary ACP Conversation in minutes:  16 minutes    Victorina Velázquez MD

## 2023-10-10 ENCOUNTER — HOSPITAL ENCOUNTER (OUTPATIENT)
Facility: HOSPITAL | Age: 78
Setting detail: RECURRING SERIES
Discharge: HOME | End: 2023-10-13
Payer: MEDICARE

## 2023-10-10 PROCEDURE — 97161 PT EVAL LOW COMPLEX 20 MIN: CPT

## 2023-10-10 PROCEDURE — 97535 SELF CARE MNGMENT TRAINING: CPT

## 2023-10-10 NOTE — PROGRESS NOTES
[facility]  [address] , [facility], [address]  Phone: [phone] Fax [phone]  Plan of Care / Statement of Necessity for Physical Therapy Services     Patient Name: Denise William : 1945   Treatment   Diagnosis: M25.552  LEFT HIP PAIN  Medical Diagnosis: Pain in left hip [M25.552]   Onset Date: 23 Payor Source: Payor: MEDICARE / Plan: MEDICARE PART A AND B / Product Type: *No Product type* /    Referral Source: Loretta Dinh MD [facility]): 10/10/2023   Prior Hospitalization: See medical history Provider #: [id number]   Prior Level of Function: Retired; lives in [place] home with daughter; functionally independent   Comorbidities: Arthritis; Back pain; Diabetes Type I; HTN; Osteopenia     Assessment / key information:  Pt is a 77 y.o. female who presents with c/o sudden onset of left hip pain after doffing undergarments while standing two months ago. Pt bent over to the right to doff pant leg and felt like left hip bone \"moved\" and Pt reports severe pain that lasted 24 hours. Symptoms gradually improved day after day until she was able to see MD and by that time reported no pain. However, Pt notes feelings of weakness in B hips/knees (hx of L/S DDD and OA in B knees) and standing imbalance, decreasing confidence with gait and increasing apprehension of falls. .  Functional deficits include: negotiates stairs with reciprocal pattern ascending, step to pattern descending and use of handrail x 2, difficulty getting in/out of vehicle, imbalance with standing activities but denies falls. Upon exam, Pt exhibited impaired B hip strength; impaired static balance, especially NBOS with LOB, requiring CGA to prevent falls. Pt would benefit from skilled PT to address above deficits to improve Pt's function and safety with gait without falls risk.     Evaluation Complexity:  History:  MEDIUM  Complexity : 1-2
1:1 Min (if diff from Tx Min) Procedure, Rationale, Specifics   15 15 27719 Self Care/Home Management (timed):  improve patient knowledge and understanding of diagnosis/prognosis, physical therapy expectations, procedures and progression, and HEP given and reviewed with Pt  to improve patient's ability to progress to PLOF and address remaining functional goals. (see flow sheet as applicable)     Details if applicable:            Details if applicable:            Details if applicable:            Details if applicable:            Details if applicable:      Total    15 Total    15 Reminder: MC/BC bill using total billable min of TIMED therapeutic procedures (example: do not include dry needle or estim unattended, both untimed codes, in totals to left)     [x]  Patient Education billed concurrently with other procedures     Other Objective/Functional Measures: FOTO 56 pts    Physical Therapy Evaluation- Hip    Posture: upright posture sitting, standing    Gait:  [x] Normal    [] Abnormal    [] Antalgic    [] NWB    Device: none    Describe: no apparent deviations    Hip AROM:                                           AROM (deg)                 Right  Left Effect   Hip     Flexion 105 100              Abduction                Adduction                Extension                ER                IR                                                 -  Strength:   Right (/5) Left (/5)   Hip     Flexion 5 4             Abduction 5 4             Adduction 5 5             Extension 4 4             ER 5 5             IR 5 5   Knee   Extension 5 5              Flexion 5 5   Ankle   Dorsiflexion 5 5               PF 12 (reps) 3 (reps)               Inversion 5 5               Eversion 5 5     Functional Leg Length (cm):   Right:  Left:  Discrepancy:      Lumbar/SIJ Screen: supine bridge test (-)    Functional Squat: WNL    Stair Negotiation: reciprocal pattern ascending, step to pattern descending with use of handrail    Balance:

## 2023-10-12 ENCOUNTER — HOSPITAL ENCOUNTER (OUTPATIENT)
Facility: HOSPITAL | Age: 78
Setting detail: RECURRING SERIES
Discharge: HOME | End: 2023-10-15
Payer: MEDICARE

## 2023-10-12 PROCEDURE — 97110 THERAPEUTIC EXERCISES: CPT

## 2023-10-12 PROCEDURE — 97112 NEUROMUSCULAR REEDUCATION: CPT

## 2023-10-12 NOTE — PROGRESS NOTES
PHYSICAL / OCCUPATIONAL THERAPY - DAILY TREATMENT NOTE (updated )    Patient Name: Elizabeth Ryder    Date: 10/12/2023    : 1945  Insurance: Payor: MEDICARE / Plan: MEDICARE PART A AND B / Product Type: *No Product type* /      Patient  verified Yes     Visit #   Current / Total 2 24   Time   In / Out 4:03 4:48   Pain   In / Out 0 0   Subjective Functional Status/Changes: My hips don't hurt. I know I need to work on my balance. TREATMENT AREA =  Pain in left hip [M25.552]    OBJECTIVE      Therapeutic Procedures: Tx Min Billable or 1:1 Min (if diff from Tx Min) Procedure, Rationale, Specifics   20  71637 Therapeutic Exercise (timed):  increase ROM, strength, coordination, balance, and proprioception to improve patient's ability to progress to PLOF and address remaining functional goals. (see flow sheet as applicable)     Details if applicable:        48853 Neuromuscular Re-Education (timed):  improve balance, coordination, kinesthetic sense, posture, core stability and proprioception to improve patient's ability to develop conscious control of individual muscles and awareness of position of extremities in order to progress to PLOF and address remaining functional goals.  (see flow sheet as applicable)     Details if applicable:            Details if applicable:            Details if applicable:            Details if applicable:     36 41  BC Totals Reminder: bill using total billable min of TIMED therapeutic procedures (example: do not include dry needle or estim unattended, both untimed codes, in totals to left)  8-22 min = 1 unit; 23-37 min = 2 units; 38-52 min = 3 units; 53-67 min = 4 units; 68-82 min = 5 units   Total Total     [x]  Patient Education billed concurrently with other procedures   [x] Review HEP    [] Progressed/Changed HEP, detail:    [] Other detail:       Objective Information/Functional Measures/Assessment    Pt seen for first follow-up after evaluation to address

## 2023-10-17 ENCOUNTER — APPOINTMENT (OUTPATIENT)
Facility: HOSPITAL | Age: 78
End: 2023-10-17
Payer: MEDICARE

## 2023-10-20 ENCOUNTER — HOSPITAL ENCOUNTER (OUTPATIENT)
Facility: HOSPITAL | Age: 78
Setting detail: RECURRING SERIES
Discharge: HOME | End: 2023-10-23
Payer: MEDICARE

## 2023-10-20 PROCEDURE — 97110 THERAPEUTIC EXERCISES: CPT

## 2023-10-20 PROCEDURE — 97112 NEUROMUSCULAR REEDUCATION: CPT

## 2023-10-20 NOTE — PROGRESS NOTES
weights for hip x 3,march and ambulation in bars. Poor balance with eyes closed,  dependent on frequent UE balance checks, and CGA. Unable to perform eccentric step downs withleft LE, and significnat use of of Ue's to assist with return to stand for right LE. Patient will continue to benefit from skilled PT / OT services to modify and progress therapeutic interventions, analyze and address functional mobility deficits, analyze and address ROM deficits, analyze and address strength deficits, analyze and address soft tissue restrictions, analyze and cue for proper movement patterns, analyze and modify for postural abnormalities, analyze and address imbalance/dizziness, and instruct in home and community integration to address functional deficits and attain remaining goals. Progress toward goals / Updated goals:  []  See Progress Note/Recertification    1- Goal: Pt to be compliant with initial HEP to improve B LE strength for improved stamina and ease of ADLs. Status at last note/certification: Established and reviewed with Pt  Long Term Goals: To be accomplished in 12 weeks:  1- Goal: Pt to increase left hip strength to 5/5 grossly to increase ease of negotiating stairs reciprocally ascending and descending consistently. Status at last note/certification:     Right (/5) Left (/5)   Hip     Flexion 5 4             Abduction 5 4             Extension 4 4      2- Goal: Pt to perform 20 single leg heel raises bilaterally without fatigue or deviations to show improved calf strength, ankle stability for gait stability. Status at last note/certification: 12 reps right, 3 reps left  3- Goal: Pt to perform sharpened romberg x 15 seconds to show improved NBOS stability and reduced falls risk with gait.   Status at last note/certification: 2 seconds left, 7 seconds right  Met: 30 seconds bilaterally, floor (10/20/2023)  4- Goal: Pt to improve SLS balance to 10 seconds bilaterally to show improved single limb stability

## 2023-10-24 ENCOUNTER — HOSPITAL ENCOUNTER (OUTPATIENT)
Facility: HOSPITAL | Age: 78
Setting detail: RECURRING SERIES
Discharge: HOME | End: 2023-10-27
Payer: MEDICARE

## 2023-10-24 PROCEDURE — 97110 THERAPEUTIC EXERCISES: CPT

## 2023-10-24 PROCEDURE — 97530 THERAPEUTIC ACTIVITIES: CPT

## 2023-10-24 PROCEDURE — 97112 NEUROMUSCULAR REEDUCATION: CPT

## 2023-10-24 NOTE — PROGRESS NOTES
PHYSICAL / OCCUPATIONAL THERAPY - DAILY TREATMENT NOTE (updated )    Patient Name: Ezra Gaming    Date: 10/24/2023    : 1945  Insurance: Payor: MEDICARE / Plan: MEDICARE PART A AND B / Product Type: *No Product type* /      Patient  verified Yes     Visit #   Current / Total 4 24   Time   In / Out 2:02 pm 2:40 pm   Pain   In / Out 0/10 0/10   Subjective Functional Status/Changes: \"I don't have any pain but my knees have been bothering me so I want to avoid the exercises that puts a little more pressure on them today. \"     TREATMENT AREA =  Pain in left hip [M25.552]    OBJECTIVE      Therapeutic Procedures: Tx Min Billable or 1:1 Min (if diff from Tx Min) Procedure, Rationale, Specifics   13 13 [id number] Neuromuscular Re-Education (timed):  improve balance, coordination, kinesthetic sense, posture, core stability and proprioception to improve patient's ability to develop conscious control of individual muscles and awareness of position of extremities in order to progress to PLOF and address remaining functional goals. (see flow sheet as applicable)     Details if applicable:       15 15 08925 Therapeutic Exercise (timed):  increase ROM, strength, coordination, balance, and proprioception to improve patient's ability to progress to PLOF and address remaining functional goals. (see flow sheet as applicable)     Details if applicable:     10 10  37638 Therapeutic Activity (timed):  use of dynamic activities replicating functional movements to increase ROM, strength, coordination, balance, and proprioception in order to improve patient's ability to progress to PLOF and address remaining functional goals.   (see flow sheet as applicable)       Details if applicable:            Details if applicable:            Details if applicable:     45 38 [address] Reminder: bill using total billable min of TIMED therapeutic procedures (example: do not include dry needle or estim unattended, both untimed codes,

## 2023-10-27 ENCOUNTER — APPOINTMENT (OUTPATIENT)
Facility: HOSPITAL | Age: 78
End: 2023-10-27
Payer: MEDICARE

## 2023-10-31 ENCOUNTER — APPOINTMENT (OUTPATIENT)
Facility: HOSPITAL | Age: 78
End: 2023-10-31
Payer: MEDICARE

## 2023-11-02 ENCOUNTER — APPOINTMENT (OUTPATIENT)
Facility: HOSPITAL | Age: 78
End: 2023-11-02
Payer: MEDICARE

## 2023-11-03 ENCOUNTER — HOSPITAL ENCOUNTER (OUTPATIENT)
Facility: HOSPITAL | Age: 78
Setting detail: RECURRING SERIES
Discharge: HOME | End: 2023-11-06
Payer: MEDICARE

## 2023-11-03 PROCEDURE — 97110 THERAPEUTIC EXERCISES: CPT

## 2023-11-03 PROCEDURE — 97530 THERAPEUTIC ACTIVITIES: CPT

## 2023-11-03 PROCEDURE — 97112 NEUROMUSCULAR REEDUCATION: CPT

## 2023-11-03 NOTE — PROGRESS NOTES
PHYSICAL THERAPY - DISCHARGE DAILY NOTE AND SUMMARY (updated )    Patient Name: Lee Alaniz : 1945   Treatment/Medical Diagnosis: Pain in left hip [M25.552]   Referral Source: Sara Vasquez MD     Payor: Payor: Raudel Méndez / Plan: MEDICARE PART A AND B / Product Type: *No Product type* /            Reporting Period : 10/10/2023 to 11/3/2023    Date: 11/3/2023    Patient  verified yes     Visit #   Current / Total 5 24   Time   In / Out 2:00 2:40   Pain   In / Out 0/10 010   Subjective Functional Status/Changes: I'm feeling pretty good today. TREATMENT AREA =  Pain in left hip [M25.552]    OBJECTIVE  Therapeutic Procedures: Tx Min Billable or 1:1 Min (if diff from Tx Min) Procedure, Rationale, Specifics   10 10 43473 Therapeutic Activity (timed):  use of dynamic activities replicating functional movements to increase ROM, strength, coordination, balance, and proprioception in order to improve patient's ability to progress to PLOF and address remaining functional goals. (see flow sheet as applicable)     Details if applicable:      09733 Therapeutic Exercise (timed):  increase ROM, strength, coordination, balance, and proprioception to improve patient's ability to progress to PLOF and address remaining functional goals. (see flow sheet as applicable)     Details if applicable:     10 10 53550 Neuromuscular Re-Education (timed):  improve balance, coordination, kinesthetic sense, posture, core stability and proprioception to improve patient's ability to develop conscious control of individual muscles and awareness of position of extremities in order to progress to PLOF and address remaining functional goals.  (see flow sheet as applicable)     Details if applicable:               40 36 [address] [facility] Reminder: bill using total billable min of TIMED therapeutic procedures (example: do not include dry needle or estim unattended, both untimed codes, in totals to left)  8-22 min = 1 unit;

## 2023-11-03 NOTE — PROGRESS NOTES
Physical Therapy Discharge Instructions      [facility]  [address]   [phone] fax      Patient: Nancy Sanders  : 1945      Continue Home Exercise Program once per day for 5 weeks, then decrease to 2-3 times per week      Continue with    [x] Ice  as needed 2 times per day     [x] Heat           Follow up with MD:     [] Upon completion of therapy     [x] As needed      Recommendations:     [x]   Return to activity with home program    []   Return to activity with the following modifications:       []Post Rehab Program    []Join Independent aquatic program     [x]Return to/join local gym        Additional Comments: good luck!!!           Yesica Yadav, DANIEL 11/3/2023 2:38 PM

## 2023-11-08 DIAGNOSIS — G47.00 INSOMNIA, UNSPECIFIED TYPE: ICD-10-CM

## 2023-11-08 NOTE — TELEPHONE ENCOUNTER
Please refill if appropriate or refuse medication if not appropriate.     Last UDS: not on file    CSA Last signed: not on file     needs to be reviewed     Last refill: 8/14/2023      PCP: Tristen Blandon MD    Future Appointments   Date Time Provider [address] [place] [address]   9/10/2024  8:30 AM IO LAB VISIT [facility] BS AMB   9/17/2024  9:40 AM Collette Ferreira MD [facility] BS AMB       Requested Prescriptions     Pending Prescriptions Disp Refills    zolpidem (AMBIEN) 5 MG tablet [Pharmacy Med Name: ZOLPIDEM TARTRATE 5 MG TABLET] 30 tablet      Sig: take 1 tablet by mouth at bedtime if needed for sleep

## 2023-11-10 RX ORDER — ZOLPIDEM TARTRATE 5 MG/1
TABLET ORAL
Qty: 30 TABLET | Refills: 0 | Status: SHIPPED | OUTPATIENT
Start: 2023-11-10 | End: 2023-12-10

## 2024-03-28 ENCOUNTER — TRANSCRIBE ORDERS (OUTPATIENT)
Facility: HOSPITAL | Age: 79
End: 2024-03-28

## 2024-03-28 DIAGNOSIS — Z12.31 VISIT FOR SCREENING MAMMOGRAM: Primary | ICD-10-CM

## 2024-04-15 ENCOUNTER — HOSPITAL ENCOUNTER (OUTPATIENT)
Facility: HOSPITAL | Age: 79
Discharge: HOME | End: 2024-04-18
Attending: INTERNAL MEDICINE
Payer: MEDICARE

## 2024-04-15 DIAGNOSIS — Z12.31 VISIT FOR SCREENING MAMMOGRAM: ICD-10-CM

## 2024-04-15 PROCEDURE — 77063 BREAST TOMOSYNTHESIS BI: CPT

## 2024-06-06 NOTE — MR AVS SNAPSHOT
Visit Information Date & Time Provider Department Dept. Phone Encounter #  
 3/16/2017  9:30 AM Laura Thomas MD Internist of [address] [place] [address] [id number] Your Appointments 3/22/2018 10:30 AM  
PHYSICAL with Laura Thomas MD  
Internist of [facility]) Appt Note: Physical  
 [address], [address] [place] [address] [address] [place]  
  
   
 [address], [address] Upcoming Health Maintenance Date Due  
 FOOT EXAM Q1 7/10/2015 EYE EXAM RETINAL OR DILATED Q1 6/5/2016 GLAUCOMA SCREENING Q2Y 6/5/2017 HEMOGLOBIN A1C Q6M 8/24/2017 MICROALBUMIN Q1 2/24/2018 LIPID PANEL Q1 2/24/2018 MEDICARE YEARLY EXAM 3/17/2018 BREAST CANCER SCRN MAMMOGRAM 10/10/2018 DTaP/Tdap/Td series (2 - Td) 5/24/2022 COLONOSCOPY 9/25/2025 Allergies as of 3/16/2017  Review Complete On: 3/13/2017 By: Laura Thomas MD  
  
 Severity Noted Reaction Type Reactions Fosamax [Alendronate]    Other (comments) GI upset Current Immunizations  Reviewed on 3/13/2017 Name Date Influenza High Dose Vaccine PF 10/9/2015 Influenza Vaccine 9/11/2013 Influenza Vaccine Whole 10/3/2012, 9/20/2011 Pneumococcal Conjugate (PCV-13) 7/10/2015  9:12 AM  
 Pneumococcal Vaccine (Unspecified Type) 1/18/2008, 1/1/1999 TDAP Vaccine 5/24/2012 Zoster 2/26/2008 Reviewed by Laura Thomas MD on 3/13/2017 at 11:44 AM  
You Were Diagnosed With   
  
 Codes Comments Routine general medical examination at a health care facility    -  Primary ICD-10-CM: Z00.00 ICD-9-CM: V70.0 Screening for alcoholism     ICD-10-CM: Z13.89 ICD-9-CM: V79.1 Advanced directives, counseling/discussion     ICD-10-CM: Z71.89 ICD-9-CM: V65.49 Screening for depression     ICD-10-CM: Z13.89 ICD-9-CM: V79.0 Screen for colon cancer     ICD-10-CM: Z12.11 ICD-9-CM: V76.51   
 Screening for ischemic heart disease     ICD-10-CM: Z13.6 ICD-9-CM: V81.0 Encounter for screening mammogram for malignant neoplasm of breast     ICD-10-CM: Z12.31 
ICD-9-CM: V76.12 Postmenopausal     ICD-10-CM: Z78.0 ICD-9-CM: V49.81 Vitals BP Pulse Temp Height(growth percentile) Weight(growth percentile) SpO2  
 130/72 96 97.9 °F (36.6 °C) (Oral) 5' 2\" (1.575 m) 162 lb (73.5 kg) 98% BMI OB Status Smoking Status 29.63 kg/m2 Postmenopausal Former Smoker Vitals History BMI and BSA Data Body Mass Index Body Surface Area  
 29.63 kg/m 2 1.79 m 2 Preferred Pharmacy Pharmacy Name Phone [facility] [address] [place] [address], [facility] [place] [address] [phone] Your Updated Medication List  
  
   
This list is accurate as of: 3/16/17 10:17 AM.  Always use your most recent med list.  
  
  
  
  
 evening primrose oil 500 mg Cap Take 500 mg by mouth nightly. EVISTA 60 mg tablet Generic drug:  raloxifene Take  by mouth daily. HumaLOG 100 unit/mL injection Generic drug:  insulin lispro  
by Continuous Infusion route. On insulin pump managed by DI  
  
 lisinopril 10 mg tablet Commonly known as:  Delsie Commander Take  by mouth daily. ZOCOR 20 mg tablet Generic drug:  simvastatin Take  by mouth nightly. We Performed the Following ADVANCE CARE PLANNING FIRST 30 MINS [26913 CPT(R)] [facility] [[id number] [facility]] Patient Instructions Medicare Part B Preventive Services Limitations Recommendation Scheduled Bone Mass Measurement 
(age 72 & older, biennial) Requires diagnosis related to osteoporosis or estrogen deficiency. Biennial benefit unless patient has history of long-term glucocorticoid tx or baseline is needed because initial test was by other method Cardiovascular Screening Blood Tests (every 5 years) Total cholesterol, HDL, Triglycerides Order as a panel if possible Colorectal Cancer Screening 
-Fecal occult blood test (annual) -Flexible sigmoidoscopy (5y) 
-Screening colonoscopy (10y) -Barium Enema Counseling to Prevent Tobacco Use (up to 8 sessions per year) - Counseling greater than 3 and up to 10 minutes - Counseling greater than 10 minutes Patients must be asymptomatic of tobacco-related conditions to receive as preventive service Diabetes Screening Tests (at least every 3 years, Medicare covers annually or at 6-month intervals for prediabetic patients) Fasting blood sugar (FBS) or glucose tolerance test (GTT) Patient must be diagnosed with one of the following: 
-Hypertension, Dyslipidemia, obesity, previous impaired FBS or GTT 
Or any two of the following: overweight, FH of diabetes, age ? 72, history of gestational diabetes, birth of baby weighing more than 9 pounds Diabetes Self-Management Training (DSMT) (no USPSTF recommendation) Requires referral by treating physician for patient with diabetes or renal disease. 10 hours of initial DSMT session of no less than 30 minutes each in a continuous 12-month period. 2 hours of follow-up DSMT in subsequent years. Glaucoma Screening (no USPSTF recommendation) Diabetes mellitus, family history, , age 48 or over,  American, age 72 or over Human Immunodeficiency Virus (HIV) Screening (annually for increased risk patients) HIV-1 and HIV-2 by EIA, ROC, rapid antibody test, or oral mucosa transudate Patient must be at increased risk for HIV infection per USPSTF guidelines or pregnant. Tests covered annually for patients at increased risk. Pregnant patients may receive up to 3 test during pregnancy.     
Medical Nutrition Therapy (MNT) (for diabetes or renal disease not recommended schedule) Requires referral by treating physician for patient with diabetes or renal disease. Can be provided in same year as diabetes self-management training (DSMT), and CMS recommends medical nutrition therapy take place after DSMT. Up to 3 hours for initial year and 2 hours in subsequent years. Prostate Cancer Screening (annually up to age 76) - Digital rectal exam (ENOCH) - Prostate specific antigen (PSA) Annually (age 48 or over), ENOCH not paid separately when covered E/M service is provided on same date Seasonal Influenza Vaccination (annually) Pneumococcal Vaccination (once after 65) Hepatitis B Vaccinations (if medium/high risk) Medium/high risk factors:  End-stage renal disease, Hemophiliacs who received Factor VIII or IX concentrates, Clients of institutions for the mentally retarded, Persons who live in the same house as a HepB virus carrier, Homosexual men, Illicit injectable drug abusers. Screening Mammography (biennial age 54-69)? Annually (age 36 or over) Screening Pap Tests and Pelvic Examination (up to age 79 and after 79 if unknown history or abnormal study last 10 years) Every 24 months except high risk Ultrasound Screening for Abdominal Aortic Aneurysm (AAA) (once) Patient must be referred through IPPE and not have had a screening for abdominal aortic aneurysm before under Medicare. Limited to patients who meet one of the following criteria: 
- Men who are 73-68 years old and have smoked more than 100 cigarettes in their lifetime. 
-Anyone with a FH of AAA 
-Anyone recommended for screening by USPSTF Introducing [facility] [place] [facility]! Dear Humera Mccormick: 
Thank you for requesting a [employer] account. Our records indicate that you already have an active [employer] account. You can access your account anytime at [email or web address] Did you know that you can access your hospital and ER discharge instructions at any time in [employer]?   You can also review all of your test results from your hospital stay or ER visit. Additional Information If you have questions, please visit the Frequently Asked Questions section of the [employer] website at [email or web address] Remember, [employer] is NOT to be used for urgent needs. For medical emergencies, dial 911. Now available from your iPhone and Android! Please provide this summary of care documentation to your next provider. Your primary care clinician is listed as Marce Courtney. If you have any questions after today's visit, please call [phone]. 18

## 2024-06-19 DIAGNOSIS — G47.00 INSOMNIA, UNSPECIFIED TYPE: ICD-10-CM

## 2024-06-23 RX ORDER — ZOLPIDEM TARTRATE 5 MG/1
TABLET ORAL
Qty: 30 TABLET | Refills: 0 | Status: SHIPPED | OUTPATIENT
Start: 2024-06-23 | End: 2024-07-23

## 2024-08-06 ENCOUNTER — TELEPHONE (OUTPATIENT)
Facility: CLINIC | Age: 79
End: 2024-08-06

## 2024-08-06 DIAGNOSIS — G89.29 CHRONIC KNEE PAIN, UNSPECIFIED LATERALITY: Primary | ICD-10-CM

## 2024-08-06 DIAGNOSIS — M25.569 CHRONIC KNEE PAIN, UNSPECIFIED LATERALITY: Primary | ICD-10-CM

## 2024-08-06 NOTE — TELEPHONE ENCOUNTER
Pt called in states she is having knee pain and rcvd shots in the right  knee about 3 or four years ago and wants to see who provider would recommend.     Callback req

## 2024-08-08 ENCOUNTER — OFFICE VISIT (OUTPATIENT)
Age: 79
End: 2024-08-08

## 2024-08-08 VITALS — HEIGHT: 62 IN | BODY MASS INDEX: 27.97 KG/M2 | WEIGHT: 152 LBS | RESPIRATION RATE: 16 BRPM

## 2024-08-08 DIAGNOSIS — M17.12 PRIMARY OSTEOARTHRITIS OF LEFT KNEE: ICD-10-CM

## 2024-08-08 DIAGNOSIS — I10 PRIMARY HYPERTENSION: ICD-10-CM

## 2024-08-08 DIAGNOSIS — M25.561 CHRONIC PAIN OF RIGHT KNEE: ICD-10-CM

## 2024-08-08 DIAGNOSIS — E11.319 TYPE 2 DIABETES MELLITUS WITH RETINOPATHY, WITH LONG-TERM CURRENT USE OF INSULIN, MACULAR EDEMA PRESENCE UNSPECIFIED, UNSPECIFIED LATERALITY, UNSPECIFIED RETINOPATHY SEVERITY (HCC): ICD-10-CM

## 2024-08-08 DIAGNOSIS — M17.11 PRIMARY OSTEOARTHRITIS OF RIGHT KNEE: Primary | ICD-10-CM

## 2024-08-08 DIAGNOSIS — Z79.4 TYPE 2 DIABETES MELLITUS WITH RETINOPATHY, WITH LONG-TERM CURRENT USE OF INSULIN, MACULAR EDEMA PRESENCE UNSPECIFIED, UNSPECIFIED LATERALITY, UNSPECIFIED RETINOPATHY SEVERITY (HCC): ICD-10-CM

## 2024-08-08 DIAGNOSIS — G89.29 CHRONIC PAIN OF RIGHT KNEE: ICD-10-CM

## 2024-08-08 RX ORDER — LIDOCAINE HYDROCHLORIDE 10 MG/ML
2 INJECTION, SOLUTION INFILTRATION; PERINEURAL ONCE
Status: COMPLETED | OUTPATIENT
Start: 2024-08-08 | End: 2024-08-08

## 2024-08-08 RX ORDER — TRIAMCINOLONE ACETONIDE 40 MG/ML
80 INJECTION, SUSPENSION INTRA-ARTICULAR; INTRAMUSCULAR ONCE
Status: COMPLETED | OUTPATIENT
Start: 2024-08-08 | End: 2024-08-08

## 2024-08-08 RX ADMIN — LIDOCAINE HYDROCHLORIDE 2 ML: 10 INJECTION, SOLUTION INFILTRATION; PERINEURAL at 11:14

## 2024-08-08 RX ADMIN — TRIAMCINOLONE ACETONIDE 80 MG: 40 INJECTION, SUSPENSION INTRA-ARTICULAR; INTRAMUSCULAR at 11:14

## 2024-08-08 NOTE — PROGRESS NOTES
patient.     Electronically signed by: Jonathan Gutierres DO    Note: This note was completed using voice recognition software.  Any typographical/name errors or mistakes are unintentional.

## 2024-08-19 ENCOUNTER — HOSPITAL ENCOUNTER (OUTPATIENT)
Facility: HOSPITAL | Age: 79
Setting detail: RECURRING SERIES
Discharge: HOME | End: 2024-08-22
Payer: MEDICARE

## 2024-08-19 PROCEDURE — 97535 SELF CARE MNGMENT TRAINING: CPT

## 2024-08-19 PROCEDURE — 97161 PT EVAL LOW COMPLEX 20 MIN: CPT

## 2024-08-19 PROCEDURE — 97110 THERAPEUTIC EXERCISES: CPT

## 2024-08-19 NOTE — PROGRESS NOTES
Complexity : 3 Standardized tests and measures addressin body structure, function, activity limitation and / or participation in recreation  ;Presentation:  LOW Complexity : Stable, uncomplicated  ;Clinical Decision Making:  Lower Extremity Functional Scale (LEFS) = 48% ; (40-60 Mild to Moderate Dysfunction) = MODERATE Complexity FOTO score = an established functional score where 100 = no disability  Overall Complexity Rating: LOW       Problem List: pain affecting function, decrease ROM, decrease strength, impaired gait/balance, decrease ADL/functional abilities, decrease activity tolerance, decrease flexibility/joint mobility, and decrease transfer abilities    Treatment Plan may include any combination of the followin Therapeutic Exercise, 35317 Neuromuscular Re-Education, 32164 Manual Therapy, 43038 Therapeutic Activity, 50282 Self Care/Home Management, and 82983 Gait Training  Patient / Family readiness to learn indicated by: asking questions, trying to perform skills, interest, return verbalization , and return demonstration   Persons(s) to be included in education: patient (P)  Barriers to Learning/Limitations: none  Measures taken if barriers to learning present: n/a  Patient Goal (s): \"help my knees\"   Patient Self Reported Health Status: good   Rehabilitation Potential: good    Short Term Goals: To be accomplished in 6 weeks  Patient will be independent with HEP to increase ease with ADLs.   Eval: HEP established   2. Patient will improve (B) Knee AROM 0-120deg without pain to increase ease with dressing.    Eval: Knee AROM: Left: 0-4-128deg; right: 0-9-123deg   Long Term Goals: To be accomplished in 12 weeks  Patient will improve LEFS to at least 65% to demonstrate improved function.   Eval: LEFS: 48%  2. Patient will be able to maintain (B) SLS On floor for 10 seconds without LOB to increase ease with community ADLs.    Eval; SLS on Floor: right: 2sec, left: 1 sec   3. Patient will be able to 
contents of this document represent the material reviewed with the patient via the .

## 2024-08-26 ENCOUNTER — HOSPITAL ENCOUNTER (OUTPATIENT)
Facility: HOSPITAL | Age: 79
Setting detail: RECURRING SERIES
Discharge: HOME | End: 2024-08-29
Payer: MEDICARE

## 2024-08-26 PROCEDURE — 97112 NEUROMUSCULAR REEDUCATION: CPT

## 2024-08-26 PROCEDURE — 97530 THERAPEUTIC ACTIVITIES: CPT

## 2024-08-26 PROCEDURE — 97110 THERAPEUTIC EXERCISES: CPT

## 2024-08-26 NOTE — PROGRESS NOTES
PHYSICAL / OCCUPATIONAL THERAPY - DAILY TREATMENT NOTE     Patient Name: Danika York    Date: 2024    : 1945  Insurance: Payor: MEDICARE / Plan: MEDICARE PART A AND B / Product Type: *No Product type* /      Patient  verified Yes     Visit #   Current / Total 2 24   Time   In / Out 10:15 10:58   Pain   In / Out 0 0   Subjective Functional Status/Changes: Patient reports no questions or concerns about the HEP.    Changes to:  Allergies, Med Hx, Sx Hx?   no       TREATMENT AREA =  Right knee pain [M25.561]  Left knee pain [M25.562]    If an interpreting service is utilized for treatment of this patient, the contents of this document represent the material reviewed with the patient via the .     OBJECTIVE    Therapeutic Procedures:  Tx Min Billable or 1:1 Min (if diff from Tx Min) Procedure, Rationale, Specifics   23  26822 Therapeutic Exercise (timed):  increase ROM, strength, coordination, balance, and proprioception to improve patient's ability to progress to PLOF and address remaining functional goals. (see flow sheet as applicable)    Details if applicable:       12  40845 Neuromuscular Re-Education (timed):  improve balance, coordination, kinesthetic sense, posture, core stability and proprioception to improve patient's ability to develop conscious control of individual muscles and awareness of position of extremities in order to progress to PLOF and address remaining functional goals. (see flow sheet as applicable)    Details if applicable:     8  49210 Therapeutic Activity (timed):  use of dynamic activities replicating functional movements to increase ROM, strength, coordination, balance, and proprioception in order to improve patient's ability to progress to PLOF and address remaining functional goals.  (see flow sheet as applicable)     Details if applicable:       32507 Therapeutic Activity (timed):  use of dynamic activities replicating functional movements to increase ROM,  strength, coordination, balance, and proprioception in order to improve patient's ability to progress to PLOF and address remaining functional goals.  (see flow sheet as applicable)    Details if applicable:            Details if applicable:     43  [facility] Totals Reminder: bill using total billable min of TIMED therapeutic procedures (example: do not include dry needle or estim unattended, both untimed codes, in totals to left)  8-22 min = 1 unit; 23-37 min = 2 units; 38-52 min = 3 units; 53-67 min = 4 units; 68-82 min = 5 units   Total Total     TOTAL TREATMENT TIME:        43     [x]  Patient Education billed concurrently with other procedures   [x] Review HEP    [] Progressed/Changed HEP, detail:    [] Other detail:       Objective Information/Functional Measures/Assessment    Initiated exercises per POC. Therapist provided cues for correct technique and muscle activation with exercises. Patient met STG #1 at this time. Patient demonstrated decreased ankle stability with balance activities and required HHA to correct minor imbalances. Patient reported no pain at end of the session.     Patient will continue to benefit from skilled PT / OT services to modify and progress therapeutic interventions, analyze and address functional mobility deficits, analyze and address ROM deficits, analyze and address strength deficits, analyze and address soft tissue restrictions, analyze and cue for proper movement patterns, analyze and modify for postural abnormalities, and analyze and address imbalance/dizziness to address functional deficits and attain remaining goals.    Progress toward goals / Updated goals:  []  See Progress Note/Recertification    Short Term Goals: To be accomplished in 6 weeks  Patient will be independent with HEP to increase ease with ADLs.   Eval: HEP established   Current: Met (8/26/24)   2. Patient will improve (B) Knee AROM 0-120deg without pain to increase ease with dressing.               Eval: Knee

## 2024-09-04 ENCOUNTER — TELEPHONE (OUTPATIENT)
Facility: HOSPITAL | Age: 79
End: 2024-09-04

## 2024-09-17 ENCOUNTER — OFFICE VISIT (OUTPATIENT)
Facility: CLINIC | Age: 79
End: 2024-09-17

## 2024-09-17 VITALS
BODY MASS INDEX: 28.16 KG/M2 | HEIGHT: 62 IN | RESPIRATION RATE: 16 BRPM | SYSTOLIC BLOOD PRESSURE: 135 MMHG | OXYGEN SATURATION: 96 % | WEIGHT: 153 LBS | DIASTOLIC BLOOD PRESSURE: 68 MMHG | HEART RATE: 96 BPM | TEMPERATURE: 98.7 F

## 2024-09-17 DIAGNOSIS — Z00.00 MEDICARE ANNUAL WELLNESS VISIT, SUBSEQUENT: Primary | ICD-10-CM

## 2024-09-17 DIAGNOSIS — E11.3299 TYPE 2 DIABETES MELLITUS WITH MILD NONPROLIFERATIVE RETINOPATHY, WITH LONG-TERM CURRENT USE OF INSULIN, MACULAR EDEMA PRESENCE UNSPECIFIED, UNSPECIFIED LATERALITY (HCC): ICD-10-CM

## 2024-09-17 DIAGNOSIS — E11.3553 STABLE PROLIFERATIVE DIABETIC RETINOPATHY OF BOTH EYES ASSOCIATED WITH TYPE 2 DIABETES MELLITUS (HCC): ICD-10-CM

## 2024-09-17 DIAGNOSIS — M85.80 OSTEOPENIA, UNSPECIFIED LOCATION: ICD-10-CM

## 2024-09-17 DIAGNOSIS — Z79.4 TYPE 2 DIABETES MELLITUS WITH MILD NONPROLIFERATIVE RETINOPATHY, WITH LONG-TERM CURRENT USE OF INSULIN, MACULAR EDEMA PRESENCE UNSPECIFIED, UNSPECIFIED LATERALITY (HCC): ICD-10-CM

## 2024-09-17 DIAGNOSIS — Z71.89 ADVANCED CARE PLANNING/COUNSELING DISCUSSION: ICD-10-CM

## 2024-09-17 DIAGNOSIS — I10 PRIMARY HYPERTENSION: ICD-10-CM

## 2024-09-17 DIAGNOSIS — E78.5 HYPERLIPIDEMIA, UNSPECIFIED HYPERLIPIDEMIA TYPE: ICD-10-CM

## 2024-09-17 PROBLEM — E11.319 TYPE 2 DIABETES MELLITUS WITH RETINOPATHY, WITH LONG-TERM CURRENT USE OF INSULIN (HCC): Status: ACTIVE | Noted: 2024-09-17

## 2024-09-17 SDOH — ECONOMIC STABILITY: FOOD INSECURITY: WITHIN THE PAST 12 MONTHS, THE FOOD YOU BOUGHT JUST DIDN'T LAST AND YOU DIDN'T HAVE MONEY TO GET MORE.: PATIENT DECLINED

## 2024-09-17 SDOH — ECONOMIC STABILITY: FOOD INSECURITY: WITHIN THE PAST 12 MONTHS, YOU WORRIED THAT YOUR FOOD WOULD RUN OUT BEFORE YOU GOT MONEY TO BUY MORE.: PATIENT DECLINED

## 2024-09-17 SDOH — ECONOMIC STABILITY: INCOME INSECURITY: HOW HARD IS IT FOR YOU TO PAY FOR THE VERY BASICS LIKE FOOD, HOUSING, MEDICAL CARE, AND HEATING?: PATIENT DECLINED

## 2024-09-17 ASSESSMENT — PATIENT HEALTH QUESTIONNAIRE - PHQ9
SUM OF ALL RESPONSES TO PHQ QUESTIONS 1-9: 0
1. LITTLE INTEREST OR PLEASURE IN DOING THINGS: NOT AT ALL
SUM OF ALL RESPONSES TO PHQ QUESTIONS 1-9: 0
SUM OF ALL RESPONSES TO PHQ9 QUESTIONS 1 & 2: 0
SUM OF ALL RESPONSES TO PHQ QUESTIONS 1-9: 0
2. FEELING DOWN, DEPRESSED OR HOPELESS: NOT AT ALL
SUM OF ALL RESPONSES TO PHQ QUESTIONS 1-9: 0

## 2024-09-17 ASSESSMENT — LIFESTYLE VARIABLES
HOW MANY STANDARD DRINKS CONTAINING ALCOHOL DO YOU HAVE ON A TYPICAL DAY: 1 OR 2
HOW OFTEN DO YOU HAVE A DRINK CONTAINING ALCOHOL: 2-3 TIMES A WEEK

## 2024-10-24 DIAGNOSIS — G47.00 INSOMNIA, UNSPECIFIED TYPE: ICD-10-CM

## 2024-10-28 RX ORDER — ZOLPIDEM TARTRATE 5 MG/1
TABLET ORAL
Qty: 30 TABLET | Refills: 0 | Status: SHIPPED | OUTPATIENT
Start: 2024-10-28 | End: 2024-11-27

## 2024-10-29 ENCOUNTER — OFFICE VISIT (OUTPATIENT)
Age: 79
End: 2024-10-29
Payer: MEDICARE

## 2024-10-29 VITALS — BODY MASS INDEX: 28.16 KG/M2 | HEIGHT: 62 IN | WEIGHT: 153 LBS

## 2024-10-29 DIAGNOSIS — M17.12 PRIMARY OSTEOARTHRITIS OF LEFT KNEE: ICD-10-CM

## 2024-10-29 DIAGNOSIS — M17.11 PRIMARY OSTEOARTHRITIS OF RIGHT KNEE: Primary | ICD-10-CM

## 2024-10-29 DIAGNOSIS — Z72.0 TOBACCO ABUSE: ICD-10-CM

## 2024-10-29 DIAGNOSIS — E10.9 TYPE 1 DIABETES MELLITUS WITHOUT COMPLICATION (HCC): ICD-10-CM

## 2024-10-29 PROCEDURE — 1090F PRES/ABSN URINE INCON ASSESS: CPT | Performed by: ORTHOPAEDIC SURGERY

## 2024-10-29 PROCEDURE — G8484 FLU IMMUNIZE NO ADMIN: HCPCS | Performed by: ORTHOPAEDIC SURGERY

## 2024-10-29 PROCEDURE — 1126F AMNT PAIN NOTED NONE PRSNT: CPT | Performed by: ORTHOPAEDIC SURGERY

## 2024-10-29 PROCEDURE — 4004F PT TOBACCO SCREEN RCVD TLK: CPT | Performed by: ORTHOPAEDIC SURGERY

## 2024-10-29 PROCEDURE — G8400 PT W/DXA NO RESULTS DOC: HCPCS | Performed by: ORTHOPAEDIC SURGERY

## 2024-10-29 PROCEDURE — 1123F ACP DISCUSS/DSCN MKR DOCD: CPT | Performed by: ORTHOPAEDIC SURGERY

## 2024-10-29 PROCEDURE — G8428 CUR MEDS NOT DOCUMENT: HCPCS | Performed by: ORTHOPAEDIC SURGERY

## 2024-10-29 PROCEDURE — 99215 OFFICE O/P EST HI 40 MIN: CPT | Performed by: ORTHOPAEDIC SURGERY

## 2024-10-29 PROCEDURE — G8419 CALC BMI OUT NRM PARAM NOF/U: HCPCS | Performed by: ORTHOPAEDIC SURGERY

## 2024-10-29 NOTE — PROGRESS NOTES
Exercise per Week: 2 days     Minutes of Exercise per Session: 30 min   Stress: Not on file   Social Connections: Not on file   Intimate Partner Violence: Not on file   Housing Stability: Unknown (9/17/2024)    Housing Stability Vital Sign     Unable to Pay for Housing in the Last Year: Not on file     Number of Times Moved in the Last Year: Not on file     Homeless in the Last Year: Patient declined       REVIEW OF SYSTEMS:      Negative except for that stated above.     [unfilled]      Prescription medication management discussed with patient.     Electronically signed by: Jonathan Gutierres DO    Note: This note was completed using voice recognition software.  Any typographical/name errors or mistakes are unintentional.

## 2024-11-17 NOTE — PROGRESS NOTES
79 y.o. female who presents for discussion.      We referred her to Dr. Ernst earlier in the summer for knee pain.  He diagnosed end-stage knees, tried cortisone which did not last for very long.  He suggested TKR on the right and they will try to get that done in April 2025.  She would like to get a second opinion from somebody in [place].  We discussed the group and settled on Dr. Gibson so referral to be sent.    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/2019    on mri multilevel foraminal and central stenosis    DM (diabetes mellitus) ([facility])     [facility]; on insulin pump; retinopathy (2021)    Dog bite 12/2021    left hand    FHx: breast cancer     Hemorrhoids     Hyperlipidemia     Hypertension     Macular degeneration     and retinopathy Dr Valdovinos; Dr Dutton; Dr Hahn    Osteoarthritis, knee     Dr Francis; s/p shots; Dr Gutierres (2024) R>L    Osteopenia     [facility] DEXA t score -1.2 spine, -2.1 hip (11/10) improved from 2007/2003;  1.7 spine, -2.1 hip (2/16); 2.8 spine, -2.2 hip (5/18) Zackery     Current Outpatient Medications   Medication Sig    zolpidem (AMBIEN) 5 MG tablet take 1 tablet by mouth at bedtime if needed for sleep    Multiple Vitamins-Minerals (THERAPEUTIC MULTIVITAMIN-MINERALS) tablet Take 1 tablet by mouth daily    melatonin 1 MG tablet Take 1 tablet by mouth nightly as needed for Sleep    Calcium Carbonate-Vitamin D 600-3.125 MG-MCG TABS Take by mouth    Evening Primrose Oil 500 MG CAPS Take 500 mg by mouth    insulin lispro (HUMALOG) 100 UNIT/ML SOLN injection vial by Continuous Infusion route. On insulin pump managed by DI    levothyroxine (SYNTHROID) 50 MCG tablet Take 1 tablet by mouth daily    lisinopril (PRINIVIL;ZESTRIL) 10 MG tablet Take 2 tablets by mouth    raloxifene (EVISTA) 60 MG tablet Take by mouth

## 2024-11-20 ENCOUNTER — OFFICE VISIT (OUTPATIENT)
Facility: CLINIC | Age: 79
End: 2024-11-20
Payer: MEDICARE

## 2024-11-20 VITALS
HEART RATE: 84 BPM | DIASTOLIC BLOOD PRESSURE: 72 MMHG | TEMPERATURE: 98.4 F | HEIGHT: 62 IN | SYSTOLIC BLOOD PRESSURE: 138 MMHG | OXYGEN SATURATION: 98 % | WEIGHT: 152 LBS | BODY MASS INDEX: 27.97 KG/M2 | RESPIRATION RATE: 16 BRPM

## 2024-11-20 DIAGNOSIS — M17.0 PRIMARY OSTEOARTHRITIS OF BOTH KNEES: Primary | ICD-10-CM

## 2024-11-20 PROCEDURE — G8419 CALC BMI OUT NRM PARAM NOF/U: HCPCS | Performed by: INTERNAL MEDICINE

## 2024-11-20 PROCEDURE — 3078F DIAST BP <80 MM HG: CPT | Performed by: INTERNAL MEDICINE

## 2024-11-20 PROCEDURE — G8484 FLU IMMUNIZE NO ADMIN: HCPCS | Performed by: INTERNAL MEDICINE

## 2024-11-20 PROCEDURE — G8427 DOCREV CUR MEDS BY ELIG CLIN: HCPCS | Performed by: INTERNAL MEDICINE

## 2024-11-20 PROCEDURE — 4004F PT TOBACCO SCREEN RCVD TLK: CPT | Performed by: INTERNAL MEDICINE

## 2024-11-20 PROCEDURE — 1123F ACP DISCUSS/DSCN MKR DOCD: CPT | Performed by: INTERNAL MEDICINE

## 2024-11-20 PROCEDURE — 1125F AMNT PAIN NOTED PAIN PRSNT: CPT | Performed by: INTERNAL MEDICINE

## 2024-11-20 PROCEDURE — 3075F SYST BP GE 130 - 139MM HG: CPT | Performed by: INTERNAL MEDICINE

## 2024-11-20 PROCEDURE — 1090F PRES/ABSN URINE INCON ASSESS: CPT | Performed by: INTERNAL MEDICINE

## 2024-11-20 PROCEDURE — 99213 OFFICE O/P EST LOW 20 MIN: CPT | Performed by: INTERNAL MEDICINE

## 2024-11-20 PROCEDURE — G8400 PT W/DXA NO RESULTS DOC: HCPCS | Performed by: INTERNAL MEDICINE

## 2024-11-20 PROCEDURE — 1159F MED LIST DOCD IN RCRD: CPT | Performed by: INTERNAL MEDICINE

## 2024-11-20 NOTE — PROGRESS NOTES
Danika York presents today for   Chief Complaint   Patient presents with    Knee Pain     Right; discuss knee surgery, Jonathan Gutierres, DO       \"Have you been to the ER, urgent care clinic since your last visit?  Hospitalized since your last visit?\"    NO    “Have you seen or consulted any other health care providers outside of [facility] since your last visit?”    NO

## 2024-12-30 DIAGNOSIS — G47.00 INSOMNIA, UNSPECIFIED TYPE: ICD-10-CM

## 2025-01-02 RX ORDER — ZOLPIDEM TARTRATE 5 MG/1
TABLET ORAL
Qty: 30 TABLET | Refills: 0 | Status: SHIPPED | OUTPATIENT
Start: 2025-01-02 | End: 2025-02-01

## 2025-03-20 ENCOUNTER — OFFICE VISIT (OUTPATIENT)
Facility: CLINIC | Age: 80
End: 2025-03-20

## 2025-03-20 VITALS
TEMPERATURE: 97.9 F | SYSTOLIC BLOOD PRESSURE: 138 MMHG | OXYGEN SATURATION: 99 % | WEIGHT: 146 LBS | HEART RATE: 84 BPM | BODY MASS INDEX: 26.87 KG/M2 | HEIGHT: 62 IN | RESPIRATION RATE: 16 BRPM | DIASTOLIC BLOOD PRESSURE: 82 MMHG

## 2025-03-20 DIAGNOSIS — M54.50 ACUTE RIGHT-SIDED LOW BACK PAIN, UNSPECIFIED WHETHER SCIATICA PRESENT: Primary | ICD-10-CM

## 2025-03-20 RX ORDER — PREDNISONE 20 MG/1
20 TABLET ORAL DAILY
Qty: 5 TABLET | Refills: 0 | Status: SHIPPED | OUTPATIENT
Start: 2025-03-20 | End: 2025-03-25

## 2025-03-20 RX ORDER — ANTIOX #8/OM3/DHA/EPA/LUT/ZEAX 250-2.5 MG
CAPSULE ORAL DAILY
COMMUNITY

## 2025-03-20 ASSESSMENT — PATIENT HEALTH QUESTIONNAIRE - PHQ9
SUM OF ALL RESPONSES TO PHQ QUESTIONS 1-9: 0
1. LITTLE INTEREST OR PLEASURE IN DOING THINGS: NOT AT ALL
2. FEELING DOWN, DEPRESSED OR HOPELESS: NOT AT ALL

## 2025-03-20 NOTE — PROGRESS NOTES
Danika York presents today for   Chief Complaint   Patient presents with    Sciatic Nerve Pain     Right       \"Have you been to the ER, urgent care clinic since your last visit?  Hospitalized since your last visit?\"    NO    “Have you seen or consulted any other health care providers outside of [facility] since your last visit?”    YES - When: approximately 1 days ago.  Where and Why: Dr. Moore/[facility], knee.

## 2025-03-20 NOTE — PROGRESS NOTES
79 y.o. female who presents for evaluation.    She has known l spine disease.  She's been going to [facility] for right knee pain and is actually scheduled to get TKR by Dr Moore end of the month.  She thinks that because of her gait changing due to the pain, she's done something to her back and feels she has 'sciatica'.  Pain in the right low back, buttock, sometimes radiates to the post thigh.  No weakness, numbness, paresthesia, saddle complaints, incontinence.  She's not tried anything. She's reluctant to use steroid as it raises her sugar dramatically    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/2019    on mri multilevel foraminal and central stenosis    DM (diabetes mellitus) ([facility])     [facility]; on insulin pump; retinopathy (2021)    Dog bite 12/2021    left hand    FHx: breast cancer     Hemorrhoids     Hyperlipidemia     Hypertension     Macular degeneration     and retinopathy Dr Valdovinos; Dr Dutton; Dr Hahn    Osteoarthritis, knee     Dr Francis; s/p shots; Dr Gutierres (2024) R>L; now Dr Moore    Osteopenia     [facility] DEXA t score -1.2 spine, -2.1 hip (11/10) improved from 2007/2003;  1.7 spine, -2.1 hip (2/16); 2.8 spine, -2.2 hip (5/18) Zackery     Past Surgical History:   Procedure Laterality Date    CATARACT REMOVAL Bilateral 02/2001    Dr Valdovinos    COLONOSCOPY N/A 2/4/2020    Dr. Guerrero (2005) neg; Dr Nascimento (9/15) neg; Dr Thurston (2/20) collagenous colitis    US ABDOMEN COMPLETE  8/02    negative     Current Outpatient Medications   Medication Sig    Multiple Vitamins-Minerals (PRESERVISION AREDS 2) CAPS Take by mouth Daily    predniSONE (DELTASONE) 20 MG tablet Take 1 tablet by mouth daily for 5 days    Multiple Vitamins-Minerals (THERAPEUTIC MULTIVITAMIN-MINERALS) tablet Take 1 tablet by mouth daily    melatonin 1 MG tablet Take 1

## 2025-04-01 ENCOUNTER — OFFICE VISIT (OUTPATIENT)
Facility: CLINIC | Age: 80
End: 2025-04-01
Payer: MEDICARE

## 2025-04-01 VITALS
OXYGEN SATURATION: 98 % | TEMPERATURE: 98.4 F | RESPIRATION RATE: 16 BRPM | HEART RATE: 91 BPM | DIASTOLIC BLOOD PRESSURE: 70 MMHG | SYSTOLIC BLOOD PRESSURE: 158 MMHG | WEIGHT: 146 LBS | HEIGHT: 62 IN | BODY MASS INDEX: 26.87 KG/M2

## 2025-04-01 DIAGNOSIS — L03.115 CELLULITIS OF RIGHT LOWER EXTREMITY: Primary | ICD-10-CM

## 2025-04-01 PROCEDURE — 1123F ACP DISCUSS/DSCN MKR DOCD: CPT | Performed by: INTERNAL MEDICINE

## 2025-04-01 PROCEDURE — G8400 PT W/DXA NO RESULTS DOC: HCPCS | Performed by: INTERNAL MEDICINE

## 2025-04-01 PROCEDURE — 3077F SYST BP >= 140 MM HG: CPT | Performed by: INTERNAL MEDICINE

## 2025-04-01 PROCEDURE — 1090F PRES/ABSN URINE INCON ASSESS: CPT | Performed by: INTERNAL MEDICINE

## 2025-04-01 PROCEDURE — 3078F DIAST BP <80 MM HG: CPT | Performed by: INTERNAL MEDICINE

## 2025-04-01 PROCEDURE — 4004F PT TOBACCO SCREEN RCVD TLK: CPT | Performed by: INTERNAL MEDICINE

## 2025-04-01 PROCEDURE — G8419 CALC BMI OUT NRM PARAM NOF/U: HCPCS | Performed by: INTERNAL MEDICINE

## 2025-04-01 PROCEDURE — 1159F MED LIST DOCD IN RCRD: CPT | Performed by: INTERNAL MEDICINE

## 2025-04-01 PROCEDURE — 99213 OFFICE O/P EST LOW 20 MIN: CPT | Performed by: INTERNAL MEDICINE

## 2025-04-01 PROCEDURE — 1125F AMNT PAIN NOTED PAIN PRSNT: CPT | Performed by: INTERNAL MEDICINE

## 2025-04-01 PROCEDURE — G8427 DOCREV CUR MEDS BY ELIG CLIN: HCPCS | Performed by: INTERNAL MEDICINE

## 2025-04-01 RX ORDER — SULFAMETHOXAZOLE AND TRIMETHOPRIM 800; 160 MG/1; MG/1
1 TABLET ORAL 2 TIMES DAILY
Qty: 20 TABLET | Refills: 0 | Status: SHIPPED | OUTPATIENT
Start: 2025-04-01 | End: 2025-04-11

## 2025-04-01 SDOH — ECONOMIC STABILITY: FOOD INSECURITY: WITHIN THE PAST 12 MONTHS, THE FOOD YOU BOUGHT JUST DIDN'T LAST AND YOU DIDN'T HAVE MONEY TO GET MORE.: NEVER TRUE

## 2025-04-01 SDOH — ECONOMIC STABILITY: FOOD INSECURITY: WITHIN THE PAST 12 MONTHS, YOU WORRIED THAT YOUR FOOD WOULD RUN OUT BEFORE YOU GOT MONEY TO BUY MORE.: NEVER TRUE

## 2025-04-01 NOTE — PROGRESS NOTES
Danika York presents today for   Chief Complaint   Patient presents with    Abrasion     Right leg       \"Have you been to the ER, urgent care clinic since your last visit?  Hospitalized since your last visit?\"    NO    “Have you seen or consulted any other health care providers outside of [facility] since your last visit?”    NO             
60 MG tablet Take by mouth daily    simvastatin (ZOCOR) 20 MG tablet Take by mouth     No current facility-administered medications for this visit.     Allergies   Allergen Reactions    Alendronate Other (See Comments)     GI upset     Vitals:    04/01/25 1350 04/01/25 1351   BP: (!) 154/64 (!) 158/70   BP Site: Left Upper Arm Right Upper Arm   Patient Position: Sitting Sitting   BP Cuff Size: Medium Adult Medium Adult   Pulse: 86 91   Resp: 16    Temp: 98.4 °F (36.9 °C)    TempSrc: Temporal    SpO2: 98%    Weight: 66.2 kg (146 lb)    Height: 1.562 m (5' 1.5\")    There is a healing skin tear roughly 1 inch diameter right medial calf with very minimal erythema in the surrounding edge.  No fluctuance, red streaking, purulent discharge.    Assessment and plan:  1.  Wound, rule out cellulitis.  Empiric treatment with Bactrim given, Tdap given in 2021.  2.  Orthopedics.  Follow-up Dr. Gibson        Above conditions discussed at length and patient vocalized understanding.  All questions answered to patient satisfaction       Diagnosis Orders   1. Cellulitis of right lower extremity  sulfamethoxazole-trimethoprim (BACTRIM DS;SEPTRA DS) 800-160 MG per tablet

## 2025-04-09 ENCOUNTER — TELEPHONE (OUTPATIENT)
Facility: CLINIC | Age: 80
End: 2025-04-09

## 2025-04-09 NOTE — TELEPHONE ENCOUNTER
Pt is concerned about the scratch on her leg and how it is healing. The scratch itself is healed and closed. Patient stated that there is one spot where under the skin is puffy and there is some redness around that area.   Patient has 3 pills left of antibiotic.     Patient has a surgery scheduled for the 25th and wants to make sure that it is healed and taken care of before then.     Pt asked if she should come into the office so Dr Ferreira can look at it? Does she need more antibiotics?     Please advise     Pharmacy [facility]     Cb# [phone]

## 2025-04-09 NOTE — TELEPHONE ENCOUNTER
Called and spoke to patient, relayed message from Dr. Ferreira; patient verbalized understanding.    No further action required.

## 2025-04-09 NOTE — TELEPHONE ENCOUNTER
We can't really make it heal any faster  Finish out the abx  Call back next week if still not looking normal

## 2025-04-20 ENCOUNTER — HOSPITAL ENCOUNTER (EMERGENCY)
Facility: HOSPITAL | Age: 80
Discharge: HOME | End: 2025-04-20
Attending: EMERGENCY MEDICINE
Payer: MEDICARE

## 2025-04-20 ENCOUNTER — APPOINTMENT (OUTPATIENT)
Facility: HOSPITAL | Age: 80
End: 2025-04-20
Payer: MEDICARE

## 2025-04-20 VITALS
SYSTOLIC BLOOD PRESSURE: 124 MMHG | WEIGHT: 151 LBS | RESPIRATION RATE: 20 BRPM | HEART RATE: 92 BPM | BODY MASS INDEX: 28.51 KG/M2 | DIASTOLIC BLOOD PRESSURE: 50 MMHG | TEMPERATURE: 98.9 F | OXYGEN SATURATION: 93 % | HEIGHT: 61 IN

## 2025-04-20 DIAGNOSIS — M54.31 SCIATICA OF RIGHT SIDE: Primary | ICD-10-CM

## 2025-04-20 LAB
ALBUMIN SERPL-MCNC: 3.2 G/DL (ref 3.4–5)
ALBUMIN/GLOB SERPL: 0.9 (ref 0.8–1.7)
ALP SERPL-CCNC: 82 U/L (ref 45–117)
ALT SERPL-CCNC: 22 U/L (ref 13–56)
ANION GAP SERPL CALC-SCNC: 7 MMOL/L (ref 3–18)
AST SERPL-CCNC: 20 U/L (ref 10–38)
BASOPHILS # BLD: 0.04 K/UL (ref 0–0.1)
BASOPHILS NFR BLD: 0.6 % (ref 0–2)
BILIRUB SERPL-MCNC: 0.3 MG/DL (ref 0.2–1)
BUN SERPL-MCNC: 17 MG/DL (ref 7–18)
BUN/CREAT SERPL: 22 (ref 12–20)
CALCIUM SERPL-MCNC: 9 MG/DL (ref 8.5–10.1)
CHLORIDE SERPL-SCNC: 108 MMOL/L (ref 100–111)
CO2 SERPL-SCNC: 25 MMOL/L (ref 21–32)
CREAT SERPL-MCNC: 0.79 MG/DL (ref 0.6–1.3)
DIFFERENTIAL METHOD BLD: ABNORMAL
EOSINOPHIL # BLD: 0.04 K/UL (ref 0–0.4)
EOSINOPHIL NFR BLD: 0.6 % (ref 0–5)
ERYTHROCYTE [DISTWIDTH] IN BLOOD BY AUTOMATED COUNT: 14.1 % (ref 11.6–14.5)
GLOBULIN SER CALC-MCNC: 3.6 G/DL (ref 2–4)
GLUCOSE SERPL-MCNC: 153 MG/DL (ref 74–99)
HCT VFR BLD AUTO: 38.5 % (ref 35–45)
HGB BLD-MCNC: 13.5 G/DL (ref 12–16)
IMM GRANULOCYTES # BLD AUTO: 0.01 K/UL (ref 0–0.04)
IMM GRANULOCYTES NFR BLD AUTO: 0.2 % (ref 0–0.5)
LYMPHOCYTES # BLD: 1.74 K/UL (ref 0.9–3.6)
LYMPHOCYTES NFR BLD: 26.6 % (ref 21–52)
MCH RBC QN AUTO: 33.2 PG (ref 24–34)
MCHC RBC AUTO-ENTMCNC: 35.1 G/DL (ref 31–37)
MCV RBC AUTO: 94.6 FL (ref 78–100)
MONOCYTES # BLD: 0.59 K/UL (ref 0.05–1.2)
MONOCYTES NFR BLD: 9 % (ref 3–10)
NEUTS SEG # BLD: 4.11 K/UL (ref 1.8–8)
NEUTS SEG NFR BLD: 63 % (ref 40–73)
NRBC # BLD: 0 K/UL (ref 0–0.01)
NRBC BLD-RTO: 0 PER 100 WBC
PLATELET # BLD AUTO: 292 K/UL (ref 135–420)
PMV BLD AUTO: 9.2 FL (ref 9.2–11.8)
POTASSIUM SERPL-SCNC: 3.4 MMOL/L (ref 3.5–5.5)
PROT SERPL-MCNC: 6.8 G/DL (ref 6.4–8.2)
RBC # BLD AUTO: 4.07 M/UL (ref 4.2–5.3)
SODIUM SERPL-SCNC: 140 MMOL/L (ref 136–145)
WBC # BLD AUTO: 6.5 K/UL (ref 4.6–13.2)

## 2025-04-20 PROCEDURE — 74177 CT ABD & PELVIS W/CONTRAST: CPT

## 2025-04-20 PROCEDURE — 6360000004 HC RX CONTRAST MEDICATION: Performed by: EMERGENCY MEDICINE

## 2025-04-20 PROCEDURE — 85025 COMPLETE CBC W/AUTO DIFF WBC: CPT

## 2025-04-20 PROCEDURE — 96375 TX/PRO/DX INJ NEW DRUG ADDON: CPT

## 2025-04-20 PROCEDURE — 80053 COMPREHEN METABOLIC PANEL: CPT

## 2025-04-20 PROCEDURE — 99285 EMERGENCY DEPT VISIT HI MDM: CPT

## 2025-04-20 PROCEDURE — 6360000002 HC RX W HCPCS: Performed by: EMERGENCY MEDICINE

## 2025-04-20 PROCEDURE — 96374 THER/PROPH/DIAG INJ IV PUSH: CPT

## 2025-04-20 PROCEDURE — 2580000003 HC RX 258: Performed by: EMERGENCY MEDICINE

## 2025-04-20 PROCEDURE — 73502 X-RAY EXAM HIP UNI 2-3 VIEWS: CPT

## 2025-04-20 RX ORDER — IOPAMIDOL 612 MG/ML
100 INJECTION, SOLUTION INTRAVASCULAR
Status: COMPLETED | OUTPATIENT
Start: 2025-04-20 | End: 2025-04-20

## 2025-04-20 RX ORDER — DEXAMETHASONE SODIUM PHOSPHATE 4 MG/ML
8 INJECTION, SOLUTION INTRA-ARTICULAR; INTRALESIONAL; INTRAMUSCULAR; INTRAVENOUS; SOFT TISSUE
Status: COMPLETED | OUTPATIENT
Start: 2025-04-20 | End: 2025-04-20

## 2025-04-20 RX ORDER — MORPHINE SULFATE 4 MG/ML
4 INJECTION, SOLUTION INTRAMUSCULAR; INTRAVENOUS
Refills: 0 | Status: COMPLETED | OUTPATIENT
Start: 2025-04-20 | End: 2025-04-20

## 2025-04-20 RX ORDER — IBUPROFEN 600 MG/1
600 TABLET, FILM COATED ORAL EVERY 8 HOURS
Qty: 9 TABLET | Refills: 0 | Status: SHIPPED | OUTPATIENT
Start: 2025-04-20 | End: 2025-04-23

## 2025-04-20 RX ORDER — ONDANSETRON 2 MG/ML
4 INJECTION INTRAMUSCULAR; INTRAVENOUS ONCE
Status: COMPLETED | OUTPATIENT
Start: 2025-04-20 | End: 2025-04-20

## 2025-04-20 RX ORDER — 0.9 % SODIUM CHLORIDE 0.9 %
500 INTRAVENOUS SOLUTION INTRAVENOUS ONCE
Status: COMPLETED | OUTPATIENT
Start: 2025-04-20 | End: 2025-04-20

## 2025-04-20 RX ADMIN — DEXAMETHASONE SODIUM PHOSPHATE 8 MG: 4 INJECTION INTRA-ARTICULAR; INTRALESIONAL; INTRAMUSCULAR; INTRAVENOUS; SOFT TISSUE at 22:00

## 2025-04-20 RX ADMIN — SODIUM CHLORIDE 500 ML: 0.9 INJECTION, SOLUTION INTRAVENOUS at 19:39

## 2025-04-20 RX ADMIN — MORPHINE SULFATE 4 MG: 4 INJECTION, SOLUTION INTRAMUSCULAR; INTRAVENOUS at 19:26

## 2025-04-20 RX ADMIN — ONDANSETRON 4 MG: 2 INJECTION, SOLUTION INTRAMUSCULAR; INTRAVENOUS at 19:24

## 2025-04-20 RX ADMIN — IOPAMIDOL 100 ML: 612 INJECTION, SOLUTION INTRAVENOUS at 20:31

## 2025-04-20 ASSESSMENT — PAIN SCALES - GENERAL
PAINLEVEL_OUTOF10: 6
PAINLEVEL_OUTOF10: 9

## 2025-04-20 ASSESSMENT — PAIN - FUNCTIONAL ASSESSMENT: PAIN_FUNCTIONAL_ASSESSMENT: 0-10

## 2025-04-20 NOTE — ED PROVIDER NOTES
EMERGENCY DEPARTMENT HISTORY AND PHYSICAL EXAM    7:07 PM EDT seen at this time in room 2        Date: 4/20/2025  Patient Name: Danika York    History of Presenting Illness     Chief Complaint   Patient presents with    Hip Pain     right         History Provided By: patient    Additional History (Context): Danika York is a 79 y.o. female presents with type I diabetic insulin-dependent, has about 2 weeks of midline back pain and right hip pain radiating down her leg.  She has to use a walker at night due to the pain lying supine in the emergency department stretcher rates her pain 9 out of 10.  She denies other systemic symptoms fever nausea vomiting.  Osteoarthritis of the right knee her surgery was canceled due to a cellulitic infection after a scratch from her dog.  This appears to be resolved..    PCP: Oumar Ferreira MD    Chief Complaint:   Duration:    Timing:    Location:   Quality:   Severity:   Modifying Factors:   Associated Symptoms:       No current facility-administered medications for this encounter.     Current Outpatient Medications   Medication Sig Dispense Refill    ibuprofen (ADVIL;MOTRIN) 600 MG tablet Take 1 tablet by mouth every 8 (eight) hours for 3 days 9 tablet 0    Multiple Vitamins-Minerals (PRESERVISION AREDS 2) CAPS Take by mouth Daily      Multiple Vitamins-Minerals (THERAPEUTIC MULTIVITAMIN-MINERALS) tablet Take 1 tablet by mouth daily      melatonin 1 MG tablet Take 1 tablet by mouth nightly as needed for Sleep 30 tablet 11    Calcium Carbonate-Vitamin D 600-3.125 MG-MCG TABS Take by mouth      Evening Primrose Oil 500 MG CAPS Take 500 mg by mouth      insulin lispro (HUMALOG) 100 UNIT/ML SOLN injection vial by Continuous Infusion route. On insulin pump managed by [facility]      levothyroxine (SYNTHROID) 50 MCG tablet Take 1 tablet by mouth daily      lisinopril (PRINIVIL;ZESTRIL) 10 MG tablet Take 2 tablets by mouth      raloxifene (EVISTA) 60 MG tablet Take by mouth daily

## 2025-04-21 NOTE — ED NOTES
Discharge teaching provided to pt regarding treatment received, medications prescribed, and follow-up care. Pt verbalized understanding directions and follow up care. Pt left ambulatory with discharge paperwork in hand.

## 2025-04-21 NOTE — ED NOTES
0840 Pt arrived ambulatory  for Injectafer.  Assessment unchanged. No new complaints voiced.     IV started  N/S up s flush line    Injectafer given IVP over 10 mins    Patient observed for 30 mins post injection  VSS    0950 Discharged home ambulatory and in no distress. Patient does not require a f/u/ apt.   Sister in laws number: Oniel York [phone]  Daughter in law: Ms. York [phone]  Son: Rajendra York [phone]

## 2025-04-23 ENCOUNTER — TELEPHONE (OUTPATIENT)
Facility: CLINIC | Age: 80
End: 2025-04-23

## 2025-04-23 NOTE — TELEPHONE ENCOUNTER
In the march visit, she c/o sciatica  We gave her prednisone - did it help?  If yes, we can try gabapentin  We can send to [facility] but it will take several weeks to see them

## 2025-04-23 NOTE — TELEPHONE ENCOUNTER
Patient contacted the office asking if we can refer her somewhere for sciatic nerve pain that she has been having for a couple months, states that pain is getting worse at night. Please advise

## 2025-04-24 NOTE — TELEPHONE ENCOUNTER
Called and spoke to patient, she states she is having knee surgery tomorrow. States if pain isn't alleviated/relieved after surgery she will contact the office for the referral.    Sending to Dr. Ferreira: DESI

## 2025-05-16 DIAGNOSIS — G47.00 INSOMNIA, UNSPECIFIED TYPE: ICD-10-CM

## 2025-05-21 RX ORDER — ZOLPIDEM TARTRATE 5 MG/1
5 TABLET ORAL NIGHTLY PRN
Qty: 30 TABLET | Refills: 1 | Status: SHIPPED | OUTPATIENT
Start: 2025-05-21 | End: 2025-07-20

## 2025-05-28 ENCOUNTER — TELEPHONE (OUTPATIENT)
Facility: CLINIC | Age: 80
End: 2025-05-28

## 2025-05-28 NOTE — TELEPHONE ENCOUNTER
Called and let [facility] know that provider will not sign off on genetic testing that he has not approved.    Was told that they would cancel the request.    No further action required.

## 2025-05-28 NOTE — TELEPHONE ENCOUNTER
[facility] called in states they sent a fax for Pt on 5/23/25 and needs it sent back today.     Please advise.         Fax#  [phone]

## 2025-07-01 ENCOUNTER — TRANSCRIBE ORDERS (OUTPATIENT)
Facility: HOSPITAL | Age: 80
End: 2025-07-01

## 2025-07-01 DIAGNOSIS — Z12.31 ENCOUNTER FOR SCREENING MAMMOGRAM FOR MALIGNANT NEOPLASM OF BREAST: Primary | ICD-10-CM

## 2025-08-07 ENCOUNTER — HOSPITAL ENCOUNTER (OUTPATIENT)
Facility: HOSPITAL | Age: 80
Discharge: HOME | End: 2025-08-10
Attending: INTERNAL MEDICINE
Payer: MEDICARE

## 2025-08-07 VITALS — HEIGHT: 61 IN | WEIGHT: 151.01 LBS | BODY MASS INDEX: 28.51 KG/M2

## 2025-08-07 DIAGNOSIS — Z12.31 ENCOUNTER FOR SCREENING MAMMOGRAM FOR MALIGNANT NEOPLASM OF BREAST: ICD-10-CM

## 2025-08-07 PROCEDURE — 77063 BREAST TOMOSYNTHESIS BI: CPT

## 2025-08-08 ENCOUNTER — HOSPITAL ENCOUNTER (EMERGENCY)
Age: 80
Discharge: HOME | End: 2025-08-08
Attending: EMERGENCY MEDICINE
Payer: MEDICARE

## 2025-08-08 ENCOUNTER — APPOINTMENT (OUTPATIENT)
Age: 80
End: 2025-08-08
Payer: MEDICARE

## 2025-08-08 VITALS
SYSTOLIC BLOOD PRESSURE: 173 MMHG | HEART RATE: 86 BPM | WEIGHT: 151 LBS | DIASTOLIC BLOOD PRESSURE: 73 MMHG | RESPIRATION RATE: 18 BRPM | OXYGEN SATURATION: 97 % | BODY MASS INDEX: 28.51 KG/M2 | HEIGHT: 61 IN | TEMPERATURE: 97.8 F

## 2025-08-08 DIAGNOSIS — S62.502B: Primary | ICD-10-CM

## 2025-08-08 PROCEDURE — 99283 EMERGENCY DEPT VISIT LOW MDM: CPT

## 2025-08-08 PROCEDURE — 73130 X-RAY EXAM OF HAND: CPT

## 2025-08-08 PROCEDURE — 73140 X-RAY EXAM OF FINGER(S): CPT

## 2025-08-08 PROCEDURE — 6370000000 HC RX 637 (ALT 250 FOR IP): Performed by: EMERGENCY MEDICINE

## 2025-08-08 PROCEDURE — 6360000002 HC RX W HCPCS: Performed by: EMERGENCY MEDICINE

## 2025-08-08 RX ORDER — CEPHALEXIN 500 MG/1
500 CAPSULE ORAL 4 TIMES DAILY
Qty: 28 CAPSULE | Refills: 0 | Status: SHIPPED | OUTPATIENT
Start: 2025-08-08 | End: 2025-08-15

## 2025-08-08 RX ORDER — LIDOCAINE HYDROCHLORIDE 10 MG/ML
5 INJECTION, SOLUTION EPIDURAL; INFILTRATION; INTRACAUDAL; PERINEURAL
Status: COMPLETED | OUTPATIENT
Start: 2025-08-08 | End: 2025-08-08

## 2025-08-08 RX ADMIN — CEPHALEXIN 500 MG: 250 CAPSULE ORAL at 12:50

## 2025-08-08 RX ADMIN — LIDOCAINE HYDROCHLORIDE 5 ML: 10 INJECTION, SOLUTION EPIDURAL; INFILTRATION; INTRACAUDAL; PERINEURAL at 10:48

## 2025-08-08 ASSESSMENT — LIFESTYLE VARIABLES
HOW OFTEN DO YOU HAVE A DRINK CONTAINING ALCOHOL: 4 OR MORE TIMES A WEEK
HOW MANY STANDARD DRINKS CONTAINING ALCOHOL DO YOU HAVE ON A TYPICAL DAY: 1 OR 2

## 2025-08-08 ASSESSMENT — PAIN - FUNCTIONAL ASSESSMENT: PAIN_FUNCTIONAL_ASSESSMENT: 0-10

## 2025-08-08 ASSESSMENT — PAIN DESCRIPTION - ORIENTATION: ORIENTATION: LEFT

## 2025-08-08 ASSESSMENT — PAIN DESCRIPTION - LOCATION: LOCATION: KNEE;FINGER (COMMENT WHICH ONE)

## 2025-09-04 ENCOUNTER — TELEPHONE (OUTPATIENT)
Facility: CLINIC | Age: 80
End: 2025-09-04

## 2025-09-04 DIAGNOSIS — E11.3299 TYPE 2 DIABETES MELLITUS WITH MILD NONPROLIFERATIVE RETINOPATHY, WITH LONG-TERM CURRENT USE OF INSULIN, MACULAR EDEMA PRESENCE UNSPECIFIED, UNSPECIFIED LATERALITY (HCC): Primary | ICD-10-CM

## 2025-09-04 DIAGNOSIS — Z79.4 TYPE 2 DIABETES MELLITUS WITH MILD NONPROLIFERATIVE RETINOPATHY, WITH LONG-TERM CURRENT USE OF INSULIN, MACULAR EDEMA PRESENCE UNSPECIFIED, UNSPECIFIED LATERALITY (HCC): Primary | ICD-10-CM

## (undated) DEVICE — GOWN ISOL IMPERV UNIV, DISP, OPEN BACK, BLUE --

## (undated) DEVICE — MEDI-VAC SUCTION HIGH CAPACITY: Brand: CARDINAL HEALTH

## (undated) DEVICE — SYRINGE MED 25GA 3ML L5/8IN SUBQ PLAS W/ DETACH NDL SFTY

## (undated) DEVICE — SOLUTION IRRIG 1000ML H2O STRL BLT

## (undated) DEVICE — ENDOSCOPY PUMP TUBING/ CAP SET: Brand: ERBE

## (undated) DEVICE — CANNULA ORIG TL CLR W FOAM CUSHIONS AND 14FT SUPL TB 3 CHN

## (undated) DEVICE — AIRLIFE™ NASAL OXYGEN CANNULA CURVED, NONFLARED TIP WITH 14 FOOT (4.3 M) CRUSH-RESISTANT TUBING, OVER-THE-EAR STYLE: Brand: AIRLIFE™

## (undated) DEVICE — SYR 20ML LL STRL LF --

## (undated) DEVICE — FORCEPS BX L240CM JAW DIA2.8MM L CAP W/ NDL MIC MESH TOOTH

## (undated) DEVICE — GAUZE,SPONGE,4"X4",16PLY,STRL,LF,10/TRAY: Brand: MEDLINE

## (undated) DEVICE — MEDI-VAC NON-CONDUCTIVE SUCTION TUBING: Brand: CARDINAL HEALTH

## (undated) DEVICE — SYR 10ML LUER LOK 1/5ML GRAD --

## (undated) DEVICE — FLEX ADVANTAGE 3000CC: Brand: FLEX ADVANTAGE

## (undated) DEVICE — SYR 50ML SLIP TIP NSAF LF STRL --

## (undated) DEVICE — CATHETER SUCT TR FL TIP 14FR W/ O CTRL

## (undated) DEVICE — FLUFF AND POLYMER UNDERPAD,EXTRA HEAVY: Brand: WINGS